# Patient Record
Sex: MALE | Race: WHITE | NOT HISPANIC OR LATINO | Employment: STUDENT | URBAN - METROPOLITAN AREA
[De-identification: names, ages, dates, MRNs, and addresses within clinical notes are randomized per-mention and may not be internally consistent; named-entity substitution may affect disease eponyms.]

---

## 2017-03-07 PROCEDURE — 99285 EMERGENCY DEPT VISIT HI MDM: CPT

## 2017-03-07 PROCEDURE — 99285 EMERGENCY DEPT VISIT HI MDM: CPT | Mod: ,,, | Performed by: EMERGENCY MEDICINE

## 2017-03-08 ENCOUNTER — HOSPITAL ENCOUNTER (EMERGENCY)
Facility: HOSPITAL | Age: 22
Discharge: HOME | End: 2017-03-08
Attending: EMERGENCY MEDICINE
Payer: COMMERCIAL

## 2017-03-08 VITALS
RESPIRATION RATE: 18 BRPM | SYSTOLIC BLOOD PRESSURE: 121 MMHG | DIASTOLIC BLOOD PRESSURE: 74 MMHG | TEMPERATURE: 98 F | HEART RATE: 78 BPM | OXYGEN SATURATION: 99 %

## 2017-03-08 DIAGNOSIS — F32.A DEPRESSION, UNSPECIFIED DEPRESSION TYPE: Primary | ICD-10-CM

## 2017-03-08 LAB
ALBUMIN SERPL BCP-MCNC: 3.6 G/DL
ALP SERPL-CCNC: 61 U/L
ALT SERPL W/O P-5'-P-CCNC: 29 U/L
ANION GAP SERPL CALC-SCNC: 8 MMOL/L
ANISOCYTOSIS BLD QL SMEAR: SLIGHT
APAP SERPL-MCNC: <3 UG/ML
AST SERPL-CCNC: 16 U/L
BASOPHILS # BLD AUTO: 0.02 K/UL
BASOPHILS NFR BLD: 0.2 %
BILIRUB SERPL-MCNC: 0.6 MG/DL
BUN SERPL-MCNC: 20 MG/DL
CALCIUM SERPL-MCNC: 8.4 MG/DL
CHLORIDE SERPL-SCNC: 106 MMOL/L
CO2 SERPL-SCNC: 26 MMOL/L
CREAT SERPL-MCNC: 1 MG/DL
DIFFERENTIAL METHOD: ABNORMAL
EOSINOPHIL # BLD AUTO: 0.2 K/UL
EOSINOPHIL NFR BLD: 2.9 %
ERYTHROCYTE [DISTWIDTH] IN BLOOD BY AUTOMATED COUNT: 13 %
EST. GFR  (AFRICAN AMERICAN): >60 ML/MIN/1.73 M^2
EST. GFR  (NON AFRICAN AMERICAN): >60 ML/MIN/1.73 M^2
ETHANOL SERPL-MCNC: <10 MG/DL
GLUCOSE SERPL-MCNC: 90 MG/DL
HCT VFR BLD AUTO: 42.6 %
HGB BLD-MCNC: 15.3 G/DL
LYMPHOCYTES # BLD AUTO: 3.5 K/UL
LYMPHOCYTES NFR BLD: 41.9 %
MCH RBC QN AUTO: 32.2 PG
MCHC RBC AUTO-ENTMCNC: 35.9 %
MCV RBC AUTO: 90 FL
MONOCYTES # BLD AUTO: 0.4 K/UL
MONOCYTES NFR BLD: 5.2 %
NEUTROPHILS # BLD AUTO: 4.1 K/UL
NEUTROPHILS NFR BLD: 49.8 %
OVALOCYTES BLD QL SMEAR: ABNORMAL
PLATELET # BLD AUTO: 172 K/UL
PLATELET BLD QL SMEAR: ABNORMAL
PMV BLD AUTO: 11.3 FL
POIKILOCYTOSIS BLD QL SMEAR: SLIGHT
POLYCHROMASIA BLD QL SMEAR: ABNORMAL
POTASSIUM SERPL-SCNC: 3.5 MMOL/L
PROT SERPL-MCNC: 6.2 G/DL
RBC # BLD AUTO: 4.75 M/UL
SODIUM SERPL-SCNC: 140 MMOL/L
TSH SERPL DL<=0.005 MIU/L-ACNC: 2.96 UIU/ML
WBC # BLD AUTO: 8.28 K/UL

## 2017-03-08 PROCEDURE — 84443 ASSAY THYROID STIM HORMONE: CPT

## 2017-03-08 PROCEDURE — 80329 ANALGESICS NON-OPIOID 1 OR 2: CPT

## 2017-03-08 PROCEDURE — 80053 COMPREHEN METABOLIC PANEL: CPT

## 2017-03-08 PROCEDURE — 80320 DRUG SCREEN QUANTALCOHOLS: CPT

## 2017-03-08 PROCEDURE — 85025 COMPLETE CBC W/AUTO DIFF WBC: CPT

## 2017-03-08 NOTE — DISCHARGE INSTRUCTIONS
Depression  Depression is one of the most common mental health problems today. It is not just a state of unhappiness or sadness. It is a true disease. The cause seems to be related to a decrease in chemicals that transmit signals in the brain. Having a family history of depression, alcoholism, or suicide increases the risk. Chronic illness, chronic pain, migraine headaches and high emotional stress also increase the risk.  Depression is something we tend to recognize in others, but may have a hard time seeing in ourselves. It can show in many physical and emotional ways:  · Loss of appetite  · Over-eating  · Not being able to sleep  · Sleeping too much  · Tiredness not related to physical exertion  · Restlessness or irritability  · Slowness of movement or speech  · Feeling depressed or withdrawn  · Loss of interest in things you once enjoyed  · Trouble concentrating, poor memory, trouble making decisions  · Thoughts of harming or killing oneself, or thoughts that life is not worth living  · Low self-esteem  The treatment for depression may include both medicine and psychotherapy. Antidepressants can reduce suffering and can improve the ability to function during the depressed period. Therapy can offer emotional support and help you understand emotional factors that may be causing the depression.  Home care  · On-going care and support helps people manage this disease.  Find a healthcare provider and therapist who meet your needs. Seek help when you feel like you may be getting ill.  · Be kind to yourself. Make it a point to do things that you enjoy (gardening, walking in nature, going to a movie, etc.). Reward yourself for small successes.  · Take care of your physical body. Eat a balanced diet (low in saturated fat and high in fruits and vegetables). Exercise at least 3 times a week for 30 minutes. Even mild-moderate exercise (like brisk walking) can make you feel better.  · Avoid alcohol, which can make  depression worse.  · Take medicine as prescribed.  · Tell each of your healthcare providers about all of the prescription drugs, over-the-counter medicines, vitamins, and supplements you take. Certain supplements interact with medicines and can result in dangerous side effects. Ask your pharmacist when you have questions about drug interactions.  · Talk with your family and trusted friends about your feelings and thoughts. Ask them to help you recognize behavior changes early so you can get help and, if needed, medicine can be adjusted.  Follow-up care  Follow up with your healthcare provider, or as advised.  Call 911  Call 911 if you:  · Have suicidal thoughts, a suicide plan, and the means to carry out the plan  · Have trouble breathing  · Are very confused  · Feel very drowsy or have trouble awakening  · Faint or lose consciousness  · Have new chest pain that becomes more severe, lasts longer, or spreads into your shoulder, arm, neck, jaw or back  When to seek medical advice  Call your healthcare provider right away if any of these occur:  · Feeling extreme depression, fear, anxiety, or anger toward yourself or others  · Feeling out of control  · Feeling that you may try to harm yourself or another  · Hearing voices that others do not hear  · Seeing things that others do not see  · Cant sleep or eat for 3 days in a row  · Friends or family express concern over your behavior and ask you to seek help  Date Last Reviewed: 9/29/2015  © 5373-7061 [employer]. [address] [place] [address], [place] [address]. All rights reserved. This information is not intended as a substitute for professional medical care. Always follow your healthcare professional's instructions.

## 2017-03-08 NOTE — ED AVS SNAPSHOT
[facility]  [address]  [facility] [address]               Baron CALEB Potterler   3/8/2017 12:36 AM   ED    Description:  Male : 1995   Department:  [facility]           Your Care was Coordinated By:     Provider Role From To    Tres Hodges MD Attending Provider 17 0038 --      Reason for Visit     Psychiatric Evaluation           Diagnoses this Visit        Comments    Depression, unspecified depression type    -  Primary       ED Disposition     None           To Do List           Follow-up Information     Follow up with [facility].    Specialty:  Emergency Medicine    Why:  If symptoms worsen    Contact information:    [address] [facility] [id number] [phone]        Follow up with Your Psychiatrist In 1 week.      [facility]     [facility] -  Assistance  Registered nurses in the [facility] provide clinical advisement, health education, appointment booking, and other advisory services.  Call for this free service at [phone].             Medications           Message regarding Medications     Verify the changes and/or additions to your medication regime listed below are the same as discussed with your clinician today.  If any of these changes or additions are incorrect, please notify your healthcare provider.             Verify that the below list of medications is an accurate representation of the medications you are currently taking.  If none reported, the list may be blank. If incorrect, please contact your healthcare provider. Carry this list with you in case of emergency.           Current Medications     albuterol (PROVENTIL) 2.5 mg /3 mL (0.083 %) nebulizer solution INHALE CONTENTS OF 1 VIAL BY NEBULIZATION EVERY 4 (FOUR) HOURS AS NEEDED FOR WHEEZING.    albuterol sulfate 2.5 mg/0.5 mL Nebu USE 2 VIALS BY NEBULIZATION EVERY 4 HOURS AS  NEEDED    beclomethasone (QVAR) 80 mcg/actuation Aero Inhale 2 puffs into the lungs 2 (two) times daily.    compressor, for nebulizer Etelvina 1 Device by Misc.(Non-Drug; Combo Route) route as needed.    epinephrine (EPIPEN JR) 0.15 mg/0.3 mL (1:2,000) pen injection Inject 0.3 mLs (0.15 mg total) into the muscle once as needed for Anaphylaxis.    mometasone-formoterol (DULERA) 200-5 mcg/actuation inhaler Inhale 2 puffs into the lungs 2 (two) times daily.    predniSONE (DELTASONE) 20 MG tablet TAKE 2 TABLETS (40 MG TOTAL) BY MOUTH 2 (TWO) TIMES DAILY.    PROAIR HFA 90 mcg/actuation inhaler INHALE 2 PUFFS INTO THE LUNGS EVERY 4 (FOUR) HOURS AS NEEDED FOR WHEEZING.    SINGULAIR 10 mg tablet TAKE 1 TABLET BY MOUTH AT BEDTIME           Clinical Reference Information           Your Vitals Were     BP Pulse Temp Resp          128/72 74 98.2 °F (36.8 °C) 18        Allergies as of 3/8/2017        Reactions    Shellfish Containing Products       Immunizations Administered on Date of Encounter - 3/8/2017     None      ED Micro, Lab, POCT     Start Ordered       Status Ordering Provider    03/08/17 0127 03/08/17 0127  CBC auto differential  STAT      In process     03/08/17 0127 03/08/17 0127  Comprehensive metabolic panel  STAT      In process     03/08/17 0127 03/08/17 0127  TSH  STAT      In process     03/08/17 0127 03/08/17 0127  Urinalysis - clean catch  STAT      Acknowledged     03/08/17 0127 03/08/17 0127  Drug screen panel, emergency  STAT      Acknowledged     03/08/17 0127 03/08/17 0127  Ethanol  STAT      In process     03/08/17 0127 03/08/17 0127  Acetaminophen level  STAT      In process       ED Imaging Orders     None        Discharge Instructions         Depression  Depression is one of the most common mental health problems today. It is not just a state of unhappiness or sadness. It is a true disease. The cause seems to be related to a decrease in chemicals that transmit signals in the brain. Having a family  history of depression, alcoholism, or suicide increases the risk. Chronic illness, chronic pain, migraine headaches and high emotional stress also increase the risk.  Depression is something we tend to recognize in others, but may have a hard time seeing in ourselves. It can show in many physical and emotional ways:  · Loss of appetite  · Over-eating  · Not being able to sleep  · Sleeping too much  · Tiredness not related to physical exertion  · Restlessness or irritability  · Slowness of movement or speech  · Feeling depressed or withdrawn  · Loss of interest in things you once enjoyed  · Trouble concentrating, poor memory, trouble making decisions  · Thoughts of harming or killing oneself, or thoughts that life is not worth living  · Low self-esteem  The treatment for depression may include both medicine and psychotherapy. Antidepressants can reduce suffering and can improve the ability to function during the depressed period. Therapy can offer emotional support and help you understand emotional factors that may be causing the depression.  Home care  · On-going care and support helps people manage this disease.  Find a healthcare provider and therapist who meet your needs. Seek help when you feel like you may be getting ill.  · Be kind to yourself. Make it a point to do things that you enjoy (gardening, walking in nature, going to a movie, etc.). Reward yourself for small successes.  · Take care of your physical body. Eat a balanced diet (low in saturated fat and high in fruits and vegetables). Exercise at least 3 times a week for 30 minutes. Even mild-moderate exercise (like brisk walking) can make you feel better.  · Avoid alcohol, which can make depression worse.  · Take medicine as prescribed.  · Tell each of your healthcare providers about all of the prescription drugs, over-the-counter medicines, vitamins, and supplements you take. Certain supplements interact with medicines and can result in dangerous side  effects. Ask your pharmacist when you have questions about drug interactions.  · Talk with your family and trusted friends about your feelings and thoughts. Ask them to help you recognize behavior changes early so you can get help and, if needed, medicine can be adjusted.  Follow-up care  Follow up with your healthcare provider, or as advised.  Call 911  Call 911 if you:  · Have suicidal thoughts, a suicide plan, and the means to carry out the plan  · Have trouble breathing  · Are very confused  · Feel very drowsy or have trouble awakening  · Faint or lose consciousness  · Have new chest pain that becomes more severe, lasts longer, or spreads into your shoulder, arm, neck, jaw or back  When to seek medical advice  Call your healthcare provider right away if any of these occur:  · Feeling extreme depression, fear, anxiety, or anger toward yourself or others  · Feeling out of control  · Feeling that you may try to harm yourself or another  · Hearing voices that others do not hear  · Seeing things that others do not see  · Cant sleep or eat for 3 days in a row  · Friends or family express concern over your behavior and ask you to seek help  Date Last Reviewed: 9/29/2015  © 8344-6299 [employer]. [address] [place] [address]. All rights reserved. This information is not intended as a substitute for professional medical care. Always follow your healthcare professional's instructions.          [facility] Sign-Up     Activating your [facility] account is as easy as 1-2-3!     1) Visit [email or web address], select Sign Up Now, enter this activation code and your date of birth, then select Next.  [email or web address]  Expires: 4/22/2017  3:14 AM      2) Create a username and password to use when you visit [facility] in the future and select a security question in case you lose your password and select Next.    3) Enter your e-mail address and click Sign Up!    Additional Information  If you have questions,  please e-mail [email or web address] or call [phone] to talk to our [facility] staff. Remember, [facility] is NOT to be used for urgent needs. For medical emergencies, dial 911.         Smoking Cessation     If you would like to quit smoking:   You may be eligible for free services if you are a [place] resident and started smoking cigarettes before September 1, 1988.  Call the Smoking Cessation Trust ([facility]) toll free at (470) 185-9713 or (080) 086-3004.   Call 3-177-QUIT-NOW if you do not meet the above criteria.             [facility] complies with applicable Federal civil rights laws and does not discriminate on the basis of race, color, national origin, age, disability, or sex.        Language Assistance Services     ATTENTION: Language assistance services are available, free of charge. Please call [phone].      ATENCIÓN: Si habla español, tiene a gonzalez disposición servicios gratuitos de asistencia lingüística. Llame al [phone].     CHÚ Ý: N?u b?n nói Ti?ng Vi?t, có các d?ch v? h? tr? ngôn ng? mi?n phí dành cho b?n. G?i s? [phone].
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
## 2017-10-17 ENCOUNTER — OFFICE VISIT (OUTPATIENT)
Dept: URGENT CARE | Facility: CLINIC | Age: 22
End: 2017-10-17
Payer: COMMERCIAL

## 2017-10-17 VITALS
RESPIRATION RATE: 16 BRPM | OXYGEN SATURATION: 100 % | DIASTOLIC BLOOD PRESSURE: 77 MMHG | SYSTOLIC BLOOD PRESSURE: 122 MMHG | HEART RATE: 64 BPM | TEMPERATURE: 97 F | WEIGHT: 160 LBS | HEIGHT: 70 IN | BODY MASS INDEX: 22.9 KG/M2

## 2017-10-17 DIAGNOSIS — J45.21 MILD INTERMITTENT ASTHMA WITH ACUTE EXACERBATION: Primary | ICD-10-CM

## 2017-10-17 PROCEDURE — 96372 THER/PROPH/DIAG INJ SC/IM: CPT | Mod: S$GLB,,, | Performed by: FAMILY MEDICINE

## 2017-10-17 PROCEDURE — 99214 OFFICE O/P EST MOD 30 MIN: CPT | Mod: 25,S$GLB,, | Performed by: FAMILY MEDICINE

## 2017-10-17 RX ORDER — PREDNISONE 20 MG/1
40 TABLET ORAL DAILY
Qty: 6 TABLET | Refills: 0 | Status: SHIPPED | OUTPATIENT
Start: 2017-10-17 | End: 2017-10-20

## 2017-10-17 RX ORDER — ALBUTEROL SULFATE 90 UG/1
2 AEROSOL, METERED RESPIRATORY (INHALATION) EVERY 6 HOURS PRN
Qty: 1 INHALER | Refills: 0 | Status: SHIPPED | OUTPATIENT
Start: 2017-10-17 | End: 2018-07-17

## 2017-10-17 RX ORDER — BETAMETHASONE SODIUM PHOSPHATE AND BETAMETHASONE ACETATE 3; 3 MG/ML; MG/ML
9 INJECTION, SUSPENSION INTRA-ARTICULAR; INTRALESIONAL; INTRAMUSCULAR; SOFT TISSUE
Status: COMPLETED | OUTPATIENT
Start: 2017-10-17 | End: 2017-10-17

## 2017-10-17 RX ORDER — AMOXICILLIN 500 MG/1
1000 CAPSULE ORAL EVERY 12 HOURS
Qty: 28 CAPSULE | Refills: 0 | Status: SHIPPED | OUTPATIENT
Start: 2017-10-17 | End: 2017-10-24

## 2017-10-17 RX ORDER — DEXTROAMPHETAMINE SACCHARATE, AMPHETAMINE ASPARTATE, DEXTROAMPHETAMINE SULFATE AND AMPHETAMINE SULFATE 3.75; 3.75; 3.75; 3.75 MG/1; MG/1; MG/1; MG/1
TABLET ORAL
Refills: 0 | COMMUNITY
Start: 2017-09-19

## 2017-10-17 RX ORDER — ALBUTEROL SULFATE 0.83 MG/ML
2.5 SOLUTION RESPIRATORY (INHALATION) EVERY 6 HOURS PRN
Qty: 1 BOX | Refills: 0 | Status: SHIPPED | OUTPATIENT
Start: 2017-10-17 | End: 2017-11-09 | Stop reason: SDUPTHER

## 2017-10-17 RX ADMIN — BETAMETHASONE SODIUM PHOSPHATE AND BETAMETHASONE ACETATE 9 MG: 3; 3 INJECTION, SUSPENSION INTRA-ARTICULAR; INTRALESIONAL; INTRAMUSCULAR; SOFT TISSUE at 09:10

## 2017-10-17 NOTE — LETTER
October 17, 2017      [facility]  [place] [address]  Phone: [phone]  Fax: [phone]       Patient: Baron Millie Shannon   YOB: 1995  Date of Visit: 10/17/2017    To Whom It May Concern:    Lavonne Shannon  was at [facility] on 10/17/2017. He may return to work/school on 10/18/2017 restrictions. If you have any questions or concerns, or if I can be of further assistance, please do not hesitate to contact me.    Sincerely,    Christa Unger MD.

## 2017-10-17 NOTE — PROGRESS NOTES
"Subjective:       Patient ID: Baron CALEB Shannon is a 22 y.o. male.    Vitals:  height is 5' 10" (1.778 m) and weight is 72.6 kg (160 lb). His oral temperature is 97.4 °F (36.3 °C). His blood pressure is 122/77 and his pulse is 64. His respiration is 16 and oxygen saturation is 100%.     Chief Complaint: Cough    Cough   This is a new problem. The current episode started yesterday. The problem has been gradually worsening. The problem occurs hourly. The cough is non-productive. Associated symptoms include nasal congestion, postnasal drip and a sore throat. Pertinent negatives include no chest pain, chills, ear pain, eye redness, fever, headaches, myalgias, shortness of breath or wheezing. Nothing aggravates the symptoms. He has tried OTC cough suppressant for the symptoms. The treatment provided mild relief. His past medical history is significant for asthma.     Review of Systems   Constitution: Positive for malaise/fatigue. Negative for chills and fever.   HENT: Positive for congestion, postnasal drip and sore throat. Negative for ear pain and hoarse voice.    Eyes: Negative for discharge and redness.   Cardiovascular: Negative for chest pain, dyspnea on exertion and leg swelling.   Respiratory: Positive for cough. Negative for shortness of breath, sputum production and wheezing.    Musculoskeletal: Negative for myalgias.   Gastrointestinal: Positive for diarrhea. Negative for abdominal pain and nausea.   Neurological: Negative for headaches.       Objective:      Physical Exam   Constitutional: He is oriented to person, place, and time. He appears well-developed and well-nourished. He is cooperative.  Non-toxic appearance. He does not appear ill. No distress.   HENT:   Head: Normocephalic and atraumatic.   Right Ear: Hearing, tympanic membrane, external ear and ear canal normal.   Left Ear: Hearing, tympanic membrane, external ear and ear canal normal.   Nose: Mucosal edema and rhinorrhea present. No nasal " deformity. No epistaxis. Right sinus exhibits no maxillary sinus tenderness and no frontal sinus tenderness. Left sinus exhibits no maxillary sinus tenderness and no frontal sinus tenderness.   Mouth/Throat: Uvula is midline, oropharynx is clear and moist and mucous membranes are normal. No trismus in the jaw. Normal dentition. No uvula swelling. No posterior oropharyngeal erythema.   Eyes: Conjunctivae and lids are normal. No scleral icterus.   Sclera clear bilat   Neck: Trachea normal, full passive range of motion without pain and phonation normal. Neck supple.   Cardiovascular: Normal rate, regular rhythm, normal heart sounds, intact distal pulses and normal pulses.    Pulmonary/Chest: Effort normal. No respiratory distress. He has wheezes in the right lower field.   Abdominal: Soft. Normal appearance and bowel sounds are normal. He exhibits no distension. There is no tenderness.   Musculoskeletal: Normal range of motion. He exhibits no edema or deformity.   Neurological: He is alert and oriented to person, place, and time. He exhibits normal muscle tone. Coordination normal.   Skin: Skin is warm, dry and intact. He is not diaphoretic. No pallor.   Psychiatric: He has a normal mood and affect. His speech is normal and behavior is normal. Judgment and thought content normal. Cognition and memory are normal.   Nursing note and vitals reviewed.      Assessment:       1. Mild intermittent asthma with acute exacerbation        Plan:         Mild intermittent asthma with acute exacerbation    Other orders  -     betamethasone acetate-betamethasone sodium phosphate injection 9 mg; Inject 1.5 mLs (9 mg total) into the muscle one time.  -     amoxicillin (AMOXIL) 500 MG capsule; Take 2 capsules (1,000 mg total) by mouth every 12 (twelve) hours.  Dispense: 28 capsule; Refill: 0  -     albuterol 90 mcg/actuation inhaler; Inhale 2 puffs into the lungs every 6 (six) hours as needed for Wheezing or Shortness of Breath. Rescue   Dispense: 1 Inhaler; Refill: 0  -     predniSONE (DELTASONE) 20 MG tablet; Take 2 tablets (40 mg total) by mouth once daily.  Dispense: 6 tablet; Refill: 0  -     albuterol (PROVENTIL) 2.5 mg /3 mL (0.083 %) nebulizer solution; Take 3 mLs (2.5 mg total) by nebulization every 6 (six) hours as needed for Wheezing. Rescue  Dispense: 1 Box; Refill: 0    Rest. Fluids. Tylenol/Ibuprofen.  Take antibiotics only if symptoms continue to worsen in the next several days.  FF up with PCP if not better after 10-14 days.  Patient voiced agreement and understanding.

## 2017-10-26 ENCOUNTER — OFFICE VISIT (OUTPATIENT)
Dept: URGENT CARE | Facility: CLINIC | Age: 22
End: 2017-10-26
Payer: COMMERCIAL

## 2017-10-26 ENCOUNTER — OFFICE VISIT (OUTPATIENT)
Dept: OTOLARYNGOLOGY | Facility: CLINIC | Age: 22
End: 2017-10-26
Payer: COMMERCIAL

## 2017-10-26 VITALS
SYSTOLIC BLOOD PRESSURE: 119 MMHG | WEIGHT: 162.25 LBS | TEMPERATURE: 97 F | HEART RATE: 94 BPM | HEIGHT: 70 IN | BODY MASS INDEX: 23.23 KG/M2 | DIASTOLIC BLOOD PRESSURE: 74 MMHG

## 2017-10-26 VITALS
TEMPERATURE: 98 F | DIASTOLIC BLOOD PRESSURE: 78 MMHG | WEIGHT: 160 LBS | OXYGEN SATURATION: 98 % | BODY MASS INDEX: 22.9 KG/M2 | RESPIRATION RATE: 16 BRPM | HEART RATE: 78 BPM | HEIGHT: 70 IN | SYSTOLIC BLOOD PRESSURE: 123 MMHG

## 2017-10-26 DIAGNOSIS — H92.12 OTORRHEA OF LEFT EAR: ICD-10-CM

## 2017-10-26 DIAGNOSIS — H74.8X2 HEMOTYMPANUM, LEFT: Primary | ICD-10-CM

## 2017-10-26 DIAGNOSIS — H66.012 ACUTE SUPPURATIVE OTITIS MEDIA OF LEFT EAR WITH SPONTANEOUS RUPTURE OF TYMPANIC MEMBRANE, RECURRENCE NOT SPECIFIED: Primary | ICD-10-CM

## 2017-10-26 DIAGNOSIS — J06.9 UPPER RESPIRATORY TRACT INFECTION, UNSPECIFIED TYPE: ICD-10-CM

## 2017-10-26 DIAGNOSIS — H74.8X2 HEMATOTYMPANUM OF LEFT EAR: ICD-10-CM

## 2017-10-26 PROCEDURE — 99204 OFFICE O/P NEW MOD 45 MIN: CPT | Mod: 25,S$GLB,, | Performed by: OTOLARYNGOLOGY

## 2017-10-26 PROCEDURE — 99999 PR PBB SHADOW E&M-EST. PATIENT-LVL III: CPT | Mod: PBBFAC,,, | Performed by: OTOLARYNGOLOGY

## 2017-10-26 PROCEDURE — 92504 EAR MICROSCOPY EXAMINATION: CPT | Mod: S$GLB,,, | Performed by: OTOLARYNGOLOGY

## 2017-10-26 PROCEDURE — 99214 OFFICE O/P EST MOD 30 MIN: CPT | Mod: S$GLB,,, | Performed by: NURSE PRACTITIONER

## 2017-10-26 RX ORDER — OFLOXACIN 3 MG/ML
5 SOLUTION AURICULAR (OTIC) DAILY
Qty: 10 ML | Refills: 0 | Status: ON HOLD | OUTPATIENT
Start: 2017-10-26 | End: 2018-01-08 | Stop reason: HOSPADM

## 2017-10-26 RX ORDER — PREDNISOLONE ACETATE 10 MG/ML
2 SUSPENSION/ DROPS OPHTHALMIC 3 TIMES DAILY
Qty: 10 ML | Refills: 0 | Status: SHIPPED | OUTPATIENT
Start: 2017-10-26 | End: 2017-11-05

## 2017-10-26 RX ORDER — CEFDINIR 300 MG/1
300 CAPSULE ORAL EVERY 12 HOURS
Qty: 14 CAPSULE | Refills: 0 | Status: SHIPPED | OUTPATIENT
Start: 2017-10-26 | End: 2017-11-02

## 2017-10-26 NOTE — PROGRESS NOTES
Chief Complaint   Patient presents with    Other     referred by urgent care, ear pain and bleeding in left ear started last night.    Sore Throat    Nasal Congestion    Cough   .     HPI: Baron CALEB Shannon is 22 y.o. male who isreferred by Dr. Alva at urgent care for evaluation of left ear pain and bloody otorrhea. He notes that he had a 1 week history of URI symptoms.  He noted that he had a sharp stabbing left ear pain that woke him from sleep and noted bloody otorrhea present on his pillow. Symptoms include left ear drainage , left ear pain, sore throat and nasal congestion. Symptoms began 1 week ago and are rapidly worsening since that time. Patient denies chills, fever and productive cough. Ear history: 0 previous ear infections. He admits to to hearing loss.           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, immuncap positive for multiple aeroallergens    Eczema     eczema is well controlled    Patient non adherence     Rhinitis, allergic      Social History     Social History    Marital status: Single     Spouse name: N/A    Number of children: N/A    Years of education: N/A     Occupational History    Not on file.     Social History Main Topics    Smoking status: Passive Smoke Exposure - Never Smoker    Smokeless tobacco: Never Used      Comment: Room mate    Alcohol use No    Drug use: No    Sexual activity: No     Other Topics Concern    Not on file     Social History Narrative    Lives at home with parents, two sisters, and his brother     Past Surgical History:   Procedure Laterality Date    staples in head      when in 8th grade on diving board      Family History   Problem Relation Age of Onset    Asthma Brother     Eczema Brother     Diabetes Maternal Grandfather     Obesity Maternal Grandfather            Review of Systems  General:  negative for chills, fever or weight loss  Psychological: negative for mood changes or depression  Ophthalmic: negative for blurry vision, photophobia or eye pain  ENT: see HPI  Respiratory: no cough, shortness of breath, or wheezing  Cardiovascular: no chest pain or dyspnea on exertion  Gastrointestinal: no abdominal pain, change in bowel habits, or black/ bloody stools  Musculoskeletal: negative for gait disturbance or muscular weakness  Neurological: no syncope or seizures; no ataxia  Dermatological: negative for puritis,  rash and jaundice  Hematologic/lymphatic: no easy bruising, no new lumps or bumps      Physical Exam:    Vitals:    10/26/17 1334   BP: 119/74   Pulse: 94   Temp: 97.2 °F (36.2 °C)       Constitutional: Well appearing / communicating without difficutly.  NAD.  Eyes: EOM I Bilaterally  Head/Face: Normocephalic.  Negative paranasal sinus pressure/tenderness.  Salivary glands WNL.  House Brackmann I Bilaterally.    Right Ear: Auricle normal appearance. External Auditory Canal within normal limits no lesions or masses,TM w/o masses/lesions/perforations. TM mobility noted.   Left Ear: Auricle normal appearance. External Auditory Canal within normal limits no lesions or masses,TM w/o masses/lesions/perforations. TM mobility noted.  Nose: No gross nasal septal deviation. Inferior Turbinates 3+ bilaterally. No septal perforation. No masses/lesions. External nasal skin appears normal without masses/lesions.  Oral Cavity: Gingiva/lips within normal limits.  Dentition/gingiva healthy appearing. Mucus membranes moist. Floor of mouth soft, no masses palpated. Oral Tongue mobile. Hard Palate appears normal.    Oropharynx: Base of tongue appears normal. No masses/lesions noted. Tonsillar fossa/pharyngeal wall without lesions. Posterior oropharynx WNL.  Soft palate without masses. Midline uvula.   Neck/Lymphatic: No LAD I-VI bilaterally.  No thyromegaly.  No masses noted on exam.    Mirror  laryngoscopy/nasopharyngoscopy: Active gag reflex.  Unable to perform.    Neuro/Psychiatric: AOx3.  Normal mood and affect.   Cardiovascular: Normal carotid pulses bilaterally, no increasing jugular venous distention noted at cervical region bilaterally.    Respiratory: Normal respiratory effort, no stridor, no retractions noted.      See separate procedure note for microscopy.      Assessment:    ICD-10-CM ICD-9-CM    1. Acute suppurative otitis media of left ear with spontaneous rupture of tympanic membrane, recurrence not specified H66.012 382.01    2. Hematotympanum of left ear H74.8X2 385.89    3. Otorrhea of left ear H92.12 388.60      The primary encounter diagnosis was Acute suppurative otitis media of left ear with spontaneous rupture of tympanic membrane, recurrence not specified. Diagnoses of Hematotympanum of left ear and Otorrhea of left ear were also pertinent to this visit.      Plan:  No orders of the defined types were placed in this encounter.    Acute otitis media with spontaneous tympanic membrane perforation- Omnicef 300mg PO BID and Ofloxacin/pred forte drops to the right ear. He was advised to keep the right ear dry, no nose blowing, avoid straining or heavy lifting. Follow up in 2 weeks to reassess. Audiogram at next visit.     Thank you kindly for allowing me to participate in the patient's care.       Kelley Roberson MD

## 2017-10-26 NOTE — PATIENT INSTRUCTIONS
FOLLOW UP WITH ENT TODAY AS SCHEDULED    Please return here or go to the Emergency Department for any concerns or worsening of condition.  If you were prescribed antibiotics, please take them to completion.  If you were prescribed a narcotic medication, do not drive or operate heavy equipment or machinery while taking these medications.  Please follow up with your primary care doctor or specialist as needed.    If you  smoke, please stop smoking.  Viral Upper Respiratory Illness (Adult)  You have a viral upper respiratory illness (URI), which is another term for the common cold. This illness is contagious during the first few days. It is spread through the air by coughing and sneezing. It may also be spread by direct contact (touching the sick person and then touching your own eyes, nose, or mouth). Frequent handwashing will decrease risk of spread. Most viral illnesses go away within 7 to 10 days with rest and simple home remedies. Sometimes the illness may last for several weeks. Antibiotics will not kill a virus, and they are generally not prescribed for this condition.    Home care  · If symptoms are severe, rest at home for the first 2 to 3 days. When you resume activity, don't let yourself get too tired.  · Avoid being exposed to cigarette smoke (yours or others).  · You may use acetaminophen or ibuprofen to control pain and fever, unless another medicine was prescribed. (Note: If you have chronic liver or kidney disease, have ever had a stomach ulcer or gastrointestinal bleeding, or are taking blood-thinning medicines, talk with your healthcare provider before using these medicines.) Aspirin should never be given to anyone under 18 years of age who is ill with a viral infection or fever. It may cause severe liver or brain damage.  · Your appetite may be poor, so a light diet is fine. Avoid dehydration by drinking 6 to 8 glasses of fluids per day (water, soft drinks, juices, tea, or soup). Extra fluids will  help loosen secretions in the nose and lungs.  · Over-the-counter cold medicines will not shorten the length of time youre sick, but they may be helpful for the following symptoms: cough, sore throat, and nasal and sinus congestion. (Note: Do not use decongestants if you have high blood pressure.)  Follow-up care  Follow up with your healthcare provider, or as advised.  When to seek medical advice  Call your healthcare provider right away if any of these occur:  · Cough with lots of colored sputum (mucus)  · Severe headache; face, neck, or ear pain  · Difficulty swallowing due to throat pain  · Fever of 100.4°F (38°C)  Call 911, or get immediate medical care  Call emergency services right away if any of these occur:  · Chest pain, shortness of breath, wheezing, or difficulty breathing  · Coughing up blood  · Inability to swallow due to throat pain  Date Last Reviewed: 9/13/2015  © 3894-9760 [employer]. [address] [place] [address], [place] [address]. All rights reserved. This information is not intended as a substitute for professional medical care. Always follow your healthcare professional's instructions.

## 2017-10-26 NOTE — LETTER
October 26, 2017      Isa Alva, ALYX  [address] [facility] [address]           [facility] Otorhinolaryngology  [address] [place] [address], [address]  [facility] [address]  Phone: [phone]  Fax: [phone]          Patient: Baron CALEB Shannon   MR Number: [id number]   YOB: 1995   Date of Visit: 10/26/2017       Dear Isa Alva:    Thank you for referring Baron Shannon to me for evaluation. Attached you will find relevant portions of my assessment and plan of care.    If you have questions, please do not hesitate to call me. I look forward to following Baron Shannon along with you.    Sincerely,    Kelley Roberson MD    Enclosure  CC:  No Recipients    If you would like to receive this communication electronically, please contact [email or web address] or [phone] to request more information on When You Wish Link access.    For providers and/or their staff who would like to refer a patient to [facility], please contact us through our one-stop-shop provider referral line, [facility], at [phone].    If you feel you have received this communication in error or would no longer like to receive these types of communications, please e-mail [email or web address]

## 2017-10-26 NOTE — PROGRESS NOTES
"Subjective:       Patient ID: Baron CALEB Shannon is a 22 y.o. male.    Vitals:  height is 5' 10" (1.778 m) and weight is 72.6 kg (160 lb). His oral temperature is 97.6 °F (36.4 °C). His blood pressure is 123/78 and his pulse is 78. His respiration is 16 and oxygen saturation is 98%.     Chief Complaint: Cough    PATIENT PRESENTS TODAY WITH CONGESTION, LEFT EAR PAIN AND BLEEDING FROM LEFT EAR. HE DENIES ANY HEAD INJURY OR EAR INJURY.  HE DENIES INTRODUCING FOREIGN BODY INTO LEFT EAR.  NO N/V.      Cough   This is a recurrent problem. The current episode started in the past 7 days. The problem has been gradually worsening. The problem occurs every few minutes. The cough is non-productive. Associated symptoms include chills, ear congestion, ear pain, a fever, myalgias, nasal congestion and a sore throat. Pertinent negatives include no chest pain, eye redness, headaches, shortness of breath or wheezing. Nothing aggravates the symptoms. He has tried oral steroids, ipratropium inhaler, steroid inhaler and OTC cough suppressant for the symptoms. The treatment provided no relief. His past medical history is significant for asthma and bronchitis.     Review of Systems   Constitution: Positive for chills and fever. Negative for malaise/fatigue.   HENT: Positive for congestion, ear discharge, ear pain, hearing loss and sore throat. Negative for hoarse voice.    Eyes: Negative for discharge and redness.   Cardiovascular: Negative for chest pain, dyspnea on exertion and leg swelling.   Respiratory: Positive for cough. Negative for shortness of breath, sputum production and wheezing.    Musculoskeletal: Positive for myalgias.   Gastrointestinal: Positive for diarrhea. Negative for abdominal pain and nausea.   Neurological: Negative for headaches.       Objective:      Physical Exam   Constitutional: He is oriented to person, place, and time. He appears well-developed and well-nourished. He is cooperative.  Non-toxic appearance. He " does not appear ill. No distress.   HENT:   Head: Normocephalic and atraumatic. Head is without raccoon's eyes and without Gamino's sign.   Right Ear: Hearing, tympanic membrane, external ear and ear canal normal.   Left Ear: Hearing, external ear and ear canal normal. There is drainage (BLOOD NOTED IN CANAL AND BEHIND TM). Tympanic membrane is bulging. Tympanic membrane is not perforated (NO PERFORATION APPRECIATED).   Nose: Nose normal. No mucosal edema, rhinorrhea or nasal deformity. No epistaxis. Right sinus exhibits no maxillary sinus tenderness and no frontal sinus tenderness. Left sinus exhibits no maxillary sinus tenderness and no frontal sinus tenderness.   Mouth/Throat: Uvula is midline, oropharynx is clear and moist and mucous membranes are normal. No trismus in the jaw. Normal dentition. No uvula swelling. No posterior oropharyngeal erythema.   Eyes: Conjunctivae and lids are normal. No scleral icterus.   Sclera clear bilat   Neck: Trachea normal, full passive range of motion without pain and phonation normal. Neck supple.   Cardiovascular: Normal rate, regular rhythm, normal heart sounds, intact distal pulses and normal pulses.    Pulmonary/Chest: Effort normal and breath sounds normal. No respiratory distress.   Abdominal: Soft. Normal appearance and bowel sounds are normal. He exhibits no distension. There is no tenderness.   Musculoskeletal: Normal range of motion. He exhibits no edema or deformity.   Neurological: He is alert and oriented to person, place, and time. No cranial nerve deficit or sensory deficit. He exhibits normal muscle tone. Coordination normal.   Skin: Skin is warm, dry and intact. He is not diaphoretic. No pallor.   Psychiatric: He has a normal mood and affect. His speech is normal and behavior is normal. Judgment and thought content normal. Cognition and memory are normal.   Nursing note and vitals reviewed.      Assessment:       1. Hemotympanum, left    2. Upper respiratory  tract infection, unspecified type        Plan:         Hemotympanum, left  -     Cancel: Ambulatory referral to ENT  -     cefdinir (OMNICEF) 300 MG capsule; Take 1 capsule (300 mg total) by mouth every 12 (twelve) hours.  Dispense: 14 capsule; Refill: 0  -     Ambulatory referral to ENT    Upper respiratory tract infection, unspecified type    Please return here or go to the Emergency Department for any concerns or worsening of condition.  If you were prescribed antibiotics, please take them to completion.  If you were prescribed a narcotic medication, do not drive or operate heavy equipment or machinery while taking these medications.  Please follow up with your primary care doctor or specialist as needed.    If you  smoke, please stop smoking.

## 2017-10-27 NOTE — PROCEDURES
Procedures     Binocular Microscopy    Indication:  Tympanic membrane perforation    Additional detail was required for the otoscopic examination of the left ear.  The operating microscope was used to directly visualize the tympanic membrane and middle ear landmarks.  Findings:  Canal skin:  normal   TM:  hemotympanus present; anterior tympanic membrane perforation with bloody otorrhea   Ossicles:  normal   Effusion:  none     The patient tolerated the procedure well.

## 2017-11-09 ENCOUNTER — LAB VISIT (OUTPATIENT)
Dept: LAB | Facility: HOSPITAL | Age: 22
End: 2017-11-09
Attending: OTOLARYNGOLOGY
Payer: COMMERCIAL

## 2017-11-09 ENCOUNTER — OFFICE VISIT (OUTPATIENT)
Dept: OTOLARYNGOLOGY | Facility: CLINIC | Age: 22
End: 2017-11-09
Payer: COMMERCIAL

## 2017-11-09 ENCOUNTER — CLINICAL SUPPORT (OUTPATIENT)
Dept: OTOLARYNGOLOGY | Facility: CLINIC | Age: 22
End: 2017-11-09
Payer: COMMERCIAL

## 2017-11-09 VITALS — DIASTOLIC BLOOD PRESSURE: 63 MMHG | HEART RATE: 77 BPM | TEMPERATURE: 97 F | SYSTOLIC BLOOD PRESSURE: 104 MMHG

## 2017-11-09 DIAGNOSIS — H66.012 ACUTE SUPPURATIVE OTITIS MEDIA OF LEFT EAR WITH SPONTANEOUS RUPTURE OF TYMPANIC MEMBRANE, RECURRENCE NOT SPECIFIED: Primary | ICD-10-CM

## 2017-11-09 DIAGNOSIS — H66.012 ACUTE SUPPURATIVE OTITIS MEDIA OF LEFT EAR WITH SPONTANEOUS RUPTURE OF TYMPANIC MEMBRANE, RECURRENCE NOT SPECIFIED: ICD-10-CM

## 2017-11-09 DIAGNOSIS — J33.9 NASAL POLYPOSIS: ICD-10-CM

## 2017-11-09 DIAGNOSIS — J30.89 NON-SEASONAL ALLERGIC RHINITIS DUE TO OTHER ALLERGIC TRIGGER, UNSPECIFIED CHRONICITY: Primary | ICD-10-CM

## 2017-11-09 DIAGNOSIS — J32.8 OTHER CHRONIC SINUSITIS: ICD-10-CM

## 2017-11-09 DIAGNOSIS — J30.89 NON-SEASONAL ALLERGIC RHINITIS DUE TO OTHER ALLERGIC TRIGGER, UNSPECIFIED CHRONICITY: ICD-10-CM

## 2017-11-09 PROCEDURE — 99214 OFFICE O/P EST MOD 30 MIN: CPT | Mod: 25,S$GLB,, | Performed by: OTOLARYNGOLOGY

## 2017-11-09 PROCEDURE — 92552 PURE TONE AUDIOMETRY AIR: CPT | Mod: S$GLB,,, | Performed by: AUDIOLOGIST-HEARING AID FITTER

## 2017-11-09 PROCEDURE — 31231 NASAL ENDOSCOPY DX: CPT | Mod: S$GLB,,, | Performed by: OTOLARYNGOLOGY

## 2017-11-09 PROCEDURE — 99999 PR PBB SHADOW E&M-EST. PATIENT-LVL III: CPT | Mod: PBBFAC,,, | Performed by: OTOLARYNGOLOGY

## 2017-11-09 PROCEDURE — 86003 ALLG SPEC IGE CRUDE XTRC EA: CPT | Mod: 59

## 2017-11-09 PROCEDURE — 86003 ALLG SPEC IGE CRUDE XTRC EA: CPT

## 2017-11-09 PROCEDURE — 36415 COLL VENOUS BLD VENIPUNCTURE: CPT

## 2017-11-09 PROCEDURE — 92567 TYMPANOMETRY: CPT | Mod: S$GLB,,, | Performed by: AUDIOLOGIST-HEARING AID FITTER

## 2017-11-09 NOTE — PROGRESS NOTES
Lissa Bosch, F-AAA [facility] [address]  [address]  [place] [address]      Patient: Baron CALEB Shannon   MRN: [id number]   : 1995  CORREA: 2017    AUDIOLOGICAL EVALUATION    CASE HISTORY:    Baron CALEB Shannon, 22 y.o., was seen on the above date for an audiological evaluation. Patient reported to be doing better.     TEST RESULTS:    Otoscopy was unremarkable for both ears. Imittance testing of both ears revealed normal middle ear compliance (Type A tympanograms). Did not complete speech testing as pure tone thresholds were within normal range.     IMPRESSION:   Audiological testing indicated that Baron CALEB Shannon has normal hearing in both ears.    RECOMMENDATIONS:   There are no audiological recommendations at this time.    If you should have any questions or concerns regarding the above information, please do not hesitate to contact me at [phone].      _______________________________  Alicia Bosch, [facility]  Clinical Audiologist    enclosure: audiogram

## 2017-11-09 NOTE — PROGRESS NOTES
Chief Complaint   Patient presents with    Follow-up     left ear has improved since last visit, bleeding has stopped, no ear pain   .     HPI: Baron CALEB Shannon is 22 y.o. male who follows up for left AOM with TM rupture.  He reports that his left ear is feeling better since last visit. He notes the bloody otorrhea has stopped. He feels his hearing is back to baseline. He denies ear pain.     He notes that he has had a lifelong history of nasal obstruction bilaterally. He notes this is associated with nasal congestion, hyposmia, nasal rhinorrhea, and post nasal drip. He notes a mild bilateral facial pressure in the ethmoid region bilaterally. He states that he has had positive allergy testing as a child. He has a history of Asthma and eczema.            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, immuncap positive for multiple aeroallergens    Eczema     eczema is well controlled    Patient non adherence     Rhinitis, allergic      Social History     Social History    Marital status: Single     Spouse name: N/A    Number of children: N/A    Years of education: N/A     Occupational History    Not on file.     Social History Main Topics    Smoking status: Passive Smoke Exposure - Never Smoker    Smokeless tobacco: Never Used      Comment: Room mate    Alcohol use No    Drug use: No    Sexual activity: No     Other Topics Concern    Not on file     Social History Narrative    Lives at home with parents, two sisters, and his brother     Past Surgical History:   Procedure Laterality Date    staples in head      when in 8th grade on diving board      Family History   Problem Relation Age of Onset    Asthma Brother     Eczema Brother     Diabetes Maternal Grandfather     Obesity Maternal Grandfather            Review of Systems  General: negative for chills,  fever or weight loss  Psychological: negative for mood changes or depression  Ophthalmic: negative for blurry vision, photophobia or eye pain  ENT: see HPI  Respiratory: no cough, shortness of breath, or wheezing  Cardiovascular: no chest pain or dyspnea on exertion  Gastrointestinal: no abdominal pain, change in bowel habits, or black/ bloody stools  Musculoskeletal: negative for gait disturbance or muscular weakness  Neurological: no syncope or seizures; no ataxia  Dermatological: negative for puritis,  rash and jaundice  Hematologic/lymphatic: no easy bruising, no new lumps or bumps      Physical Exam:    Vitals:    11/09/17 1602   BP: 104/63   Pulse: 77   Temp: 96.8 °F (36 °C)       Constitutional: Well appearing / communicating without difficutly.  NAD.  Eyes: EOM I Bilaterally  Head/Face: Normocephalic.  Negative paranasal sinus pressure/tenderness.  Salivary glands WNL.  House Brackmann I Bilaterally.    Right Ear: Auricle normal appearance. External Auditory Canal within normal limits no lesions or masses,TM w/o masses/lesions/perforations. TM mobility noted.   Left Ear: Auricle normal appearance. External Auditory Canal within normal limits no lesions or masses,TM with a healing TM perforation. no masses/lesions/perforations. TM mobility noted.  Nose: No gross nasal septal deviation. Inferior Turbinates 3+ bilaterally. No septal perforation. No masses/lesions. External nasal skin appears normal without masses/lesions.  Oral Cavity: Gingiva/lips within normal limits.  Dentition/gingiva healthy appearing. Mucus membranes moist. Floor of mouth soft, no masses palpated. Oral Tongue mobile. Hard Palate appears normal.    Oropharynx: Base of tongue appears normal. No masses/lesions noted. Tonsillar fossa/pharyngeal wall without lesions. Posterior oropharynx WNL.  Soft palate without masses. Midline uvula.   Neck/Lymphatic: No LAD I-VI bilaterally.  No thyromegaly.  No masses noted on exam.    Mirror  laryngoscopy/nasopharyngoscopy: Active gag reflex.  Unable to perform.    Neuro/Psychiatric: AOx3.  Normal mood and affect.   Cardiovascular: Normal carotid pulses bilaterally, no increasing jugular venous distention noted at cervical region bilaterally.    Respiratory: Normal respiratory effort, no stridor, no retractions noted.      See separate procedure note for nasal endoscopy.     Audiogram reviewed personally by myself and in detail with the patient today.         Assessment:    ICD-10-CM ICD-9-CM    1. Non-seasonal allergic rhinitis due to other allergic trigger, unspecified chronicity J30.89 477.8 Aspergillus fumagatus IgE      Bermuda grass IgE      Cat epithelium IgE      Cladosporium IgE      Cockroach, American IgE      [place], bald IgE      D. farinae IgE      D. pteronyssinus IgE      Dog dander IgE      Prashant grass IgE      Marsh elder, rough IgE      Oak, white IgE      Penicillium IgE      Ragweed, short, common IgE      Jessee IgE      Allergen, Elm Huson      Allergen, Meadow Grass ([place])      Allergen, Mucor Racemosus      Allergen, Pecan Hickory IgE      Allergen-Alternaria Alternata      Allergen-Huson      Allergen-Common Pigweed      Feather Panel #2      RAST Allergen Maple ([place])      RAST Allergen [place]   2. Other chronic sinusitis J32.8 473.8 CT Medtronic Sinuses without   3. Nasal polyposis J33.9 471.9 CT Medtronic Sinuses without   4. Acute suppurative otitis media of left ear with spontaneous rupture of tympanic membrane, recurrence not specified H66.012 382.01      The primary encounter diagnosis was Non-seasonal allergic rhinitis due to other allergic trigger, unspecified chronicity. Diagnoses of Other chronic sinusitis, Nasal polyposis, and Acute suppurative otitis media of left ear with spontaneous rupture of tympanic membrane, recurrence not specified were also pertinent to this visit.      Plan:  Orders Placed This Encounter   Procedures    CT [place]  Sinuses without    Aspergillus fumagatus IgE    Bermuda grass IgE    Cat epithelium IgE    Cladosporium IgE    Cockroach, American IgE    [place], bald IgE    D. farinae IgE    D. pteronyssinus IgE    Dog dander IgE    Prashant grass IgE    Marsh elder, rough IgE    Oak, white IgE    Penicillium IgE    Ragweed, short, common IgE    Jessee IgE    Allergen, Elm [place]    Allergen, Meadow Grass ([place] Blue)    Allergen, Mucor Racemosus    Allergen, Pecan Hickory IgE    Allergen-Alternaria Alternata    Allergen-Delta    Allergen-Common Pigweed    Feather Panel #2    RAST Allergen Maple ([place])    RAST Allergen [place]     Acute otitis media with spontaneous tympanic membrane perforation- healing He was advised to keep the left ear dry, no nose blowing, avoid straining or heavy lifting. Follow up in 4 weeks to reassess.     Nasal obstruction/CRS with polyposis: Plan for Immunocaps testing. Obtain CT scan of the sinuses to further assess for intraluminal patency.  Continue Singulair, start xyzal, and Flonase. Follow up after diagnostic testing is completed.     Thank you kindly for allowing me to participate in the patient's care.       Kelley Roberson MD

## 2017-11-13 LAB — FEATHER PANEL #2: <0.35 KU/L

## 2017-11-13 RX ORDER — FLUTICASONE PROPIONATE 50 MCG
2 SPRAY, SUSPENSION (ML) NASAL DAILY
Qty: 1 BOTTLE | Refills: 12 | Status: ON HOLD | OUTPATIENT
Start: 2017-11-13 | End: 2018-01-08 | Stop reason: HOSPADM

## 2017-11-13 RX ORDER — LEVOCETIRIZINE DIHYDROCHLORIDE 5 MG/1
5 TABLET, FILM COATED ORAL NIGHTLY
Qty: 30 TABLET | Refills: 11 | Status: SHIPPED | OUTPATIENT
Start: 2017-11-13 | End: 2018-11-13

## 2017-11-13 NOTE — PROCEDURES
Procedures     PROCEDURE NOTE:  Nasal endoscopy   Preprocedure diagnosis:  Chronic sinusitis  Postprocedure diangosis:  Same  Complications:  None  Blood Loss:  None    Procedure in detail:  After verbal consent was obtained, the patient's nasal cavity was anesthesized using topical 1%lidocaine and Neosynepherine.  A rigid 0 degree endoscope was placed in first the right, then the left nasal cavity.  The inferior and middle turbinates were examined, and found to be edematous and polypoid bilaterally. There was mucosal polyps emanating from the middle meatus bilaterally.   The middle meatus was occluded bilaterally.  No purulent drainage or masses seen.  The patient tolerated the procedure well and there were no complications.

## 2017-11-14 ENCOUNTER — TELEPHONE (OUTPATIENT)
Dept: OTOLARYNGOLOGY | Facility: CLINIC | Age: 22
End: 2017-11-14

## 2017-11-14 LAB
A ALTERNATA IGE QN: 7.51 KU/L
A FUMIGATUS IGE QN: 3.39 KU/L
ALLERGEN BOXELDER MAPLE TREE IGE: 5.5 KU/L
ALLERGEN MAPLE (BOX ELDER) CLASS: ABNORMAL
ALLERGEN MULBERRY CLASS: NORMAL
ALLERGEN MULBERRY TREE IGE: <0.35 KU/L
ALLERGEN PENICILLIUM IGE: 1.74 KU/L
BALD CYPRESS IGE QN: 0.66 KU/L
BERMUDA GRASS IGE QN: 3.82 KU/L
C HERBARUM IGE QN: 0.65 KU/L
CAT DANDER IGE QN: <0.35 KU/L
CEDAR IGE QN: <0.35 KU/L
CMN PIGWEED IGE QN: 6.5 KU/L
COMMON RAGWEED IGE QN: 2.99 KU/L
D FARINAE IGE QN: 7.8 KU/L
D PTERONYSS IGE QN: 6.39 KU/L
DEPRECATED A ALTERNATA IGE RAST QL: ABNORMAL
DEPRECATED A FUMIGATUS IGE RAST QL: ABNORMAL
DEPRECATED BALD CYPRESS IGE RAST QL: ABNORMAL
DEPRECATED BERMUDA GRASS IGE RAST QL: ABNORMAL
DEPRECATED C HERBARUM IGE RAST QL: ABNORMAL
DEPRECATED CAT DANDER IGE RAST QL: NORMAL
DEPRECATED CEDAR IGE RAST QL: NORMAL
DEPRECATED COMMON PIGWEED IGE RAST QL: ABNORMAL
DEPRECATED COMMON RAGWEED IGE RAST QL: ABNORMAL
DEPRECATED D FARINAE IGE RAST QL: ABNORMAL
DEPRECATED D PTERONYSS IGE RAST QL: ABNORMAL
DEPRECATED DOG DANDER IGE RAST QL: NORMAL
DEPRECATED JOHNSON GRASS IGE RAST QL: ABNORMAL
DEPRECATED KENT BLUE GRASS IGE RAST QL: ABNORMAL
DEPRECATED M RACEMOSUS IGE RAST QL: NORMAL
DEPRECATED MARSH ELDER IGE RAST QL: ABNORMAL
DEPRECATED PECAN/HICK TREE IGE RAST QL: ABNORMAL
DEPRECATED ROACH IGE RAST QL: ABNORMAL
DEPRECATED TIMOTHY IGE RAST QL: ABNORMAL
DEPRECATED WHITE OAK IGE RAST QL: ABNORMAL
DOG DANDER IGE QN: <0.35 KU/L
ELM CEDAR CLASS: ABNORMAL
ELM CEDAR, IGE: 1.14 KU/L
JOHNSON GRASS IGE QN: 5.13 KU/L
KENT BLUE GRASS IGE QN: 8.93 KU/L
M RACEMOSUS IGE QN: <0.35 KU/L
MARSH ELDER IGE QN: 1.27 KU/L
PECAN/HICK TREE IGE QN: 2.07 KU/L
PENICILLIUM CLASS: ABNORMAL
ROACH IGE QN: 9.77 KU/L
TIMOTHY IGE QN: 7.63 KU/L
WHITE OAK IGE QN: 12.6 KU/L

## 2017-11-14 NOTE — TELEPHONE ENCOUNTER
Spoke with patient he was notified of positive multiple allergens on his lab work, told patient for his follow up appointment Dr Esparza will further discuss the results. Patient verbalized understanding.

## 2017-11-16 ENCOUNTER — HOSPITAL ENCOUNTER (OUTPATIENT)
Dept: RADIOLOGY | Facility: HOSPITAL | Age: 22
Discharge: HOME | End: 2017-11-16
Attending: OTOLARYNGOLOGY
Payer: COMMERCIAL

## 2017-11-16 DIAGNOSIS — J33.9 NASAL POLYPOSIS: ICD-10-CM

## 2017-11-16 DIAGNOSIS — J32.8 OTHER CHRONIC SINUSITIS: ICD-10-CM

## 2017-11-16 PROCEDURE — 70486 CT MAXILLOFACIAL W/O DYE: CPT | Mod: TC

## 2017-11-16 PROCEDURE — 70486 CT MAXILLOFACIAL W/O DYE: CPT | Mod: 26,,, | Performed by: RADIOLOGY

## 2017-11-21 ENCOUNTER — OFFICE VISIT (OUTPATIENT)
Dept: OTOLARYNGOLOGY | Facility: CLINIC | Age: 22
End: 2017-11-21
Payer: COMMERCIAL

## 2017-11-21 VITALS
HEART RATE: 97 BPM | SYSTOLIC BLOOD PRESSURE: 118 MMHG | BODY MASS INDEX: 23.76 KG/M2 | DIASTOLIC BLOOD PRESSURE: 73 MMHG | WEIGHT: 165.56 LBS | TEMPERATURE: 98 F

## 2017-11-21 DIAGNOSIS — J32.8 OTHER CHRONIC SINUSITIS: Primary | ICD-10-CM

## 2017-11-21 DIAGNOSIS — J30.89 NON-SEASONAL ALLERGIC RHINITIS DUE TO OTHER ALLERGIC TRIGGER, UNSPECIFIED CHRONICITY: ICD-10-CM

## 2017-11-21 DIAGNOSIS — J45.909 NOT WELL CONTROLLED ASTHMA WITHOUT COMPLICATION: ICD-10-CM

## 2017-11-21 DIAGNOSIS — H66.012 ACUTE SUPPURATIVE OTITIS MEDIA OF LEFT EAR WITH SPONTANEOUS RUPTURE OF TYMPANIC MEMBRANE, RECURRENCE NOT SPECIFIED: ICD-10-CM

## 2017-11-21 DIAGNOSIS — J33.9 NASAL POLYPOSIS: ICD-10-CM

## 2017-11-21 PROCEDURE — 99999 PR PBB SHADOW E&M-EST. PATIENT-LVL IV: CPT | Mod: PBBFAC,,, | Performed by: OTOLARYNGOLOGY

## 2017-11-21 PROCEDURE — 99214 OFFICE O/P EST MOD 30 MIN: CPT | Mod: S$GLB,,, | Performed by: OTOLARYNGOLOGY

## 2017-11-21 RX ORDER — PREDNISONE 20 MG/1
TABLET ORAL
Qty: 21 TABLET | Refills: 0 | Status: SHIPPED | OUTPATIENT
Start: 2017-11-21 | End: 2018-01-04

## 2017-11-21 RX ORDER — ALBUTEROL SULFATE 90 UG/1
2 AEROSOL, METERED RESPIRATORY (INHALATION) EVERY 4 HOURS PRN
Qty: 8.5 INHALER | Refills: 0 | Status: SHIPPED | OUTPATIENT
Start: 2017-11-21 | End: 2018-02-07 | Stop reason: SDUPTHER

## 2017-11-21 RX ORDER — AMOXICILLIN AND CLAVULANATE POTASSIUM 875; 125 MG/1; MG/1
1 TABLET, FILM COATED ORAL 2 TIMES DAILY
Qty: 28 TABLET | Refills: 0 | Status: SHIPPED | OUTPATIENT
Start: 2017-11-21 | End: 2017-12-05

## 2017-11-21 RX ORDER — MUPIROCIN 20 MG/G
OINTMENT TOPICAL 2 TIMES DAILY
Qty: 15 G | Refills: 0 | Status: SHIPPED | OUTPATIENT
Start: 2017-11-21 | End: 2017-12-01

## 2017-11-21 NOTE — PROGRESS NOTES
"  Chief Complaint   Patient presents with    Sinusitis     Ct scan follow up visit    Cough    Nasal Congestion   .     HPI: Baron CALEB Shannon is 22 y.o. male who follows up for left AOM with TM rupture.  He reports that his left ear is feeling better since last visit. He notes the bloody otorrhea has stopped. He feels his hearing is back to baseline. He denies ear pain.     He notes that he has had a lifelong history of nasal obstruction bilaterally. He notes this is associated with nasal congestion, hyposmia, nasal rhinorrhea, and post nasal drip. He notes a mild bilateral facial pressure in the ethmoid region bilaterally. He states that he has had positive allergy testing as a child. He has a history of Asthma and eczema.      Interval HPI:  He reports no changes in his symptoms since last visit regarding hte nose.  He states the he has continued nasal obstruction and rhinorrhea. He feels his asthma is "worsening as the season is changing." He states he is out of his inhalers to treat his asthma. He feels his left ear is doing better.           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, immuncap positive for multiple aeroallergens    Eczema     eczema is well controlled    Patient non adherence     Rhinitis, allergic      Social History     Social History    Marital status: Single     Spouse name: N/A    Number of children: N/A    Years of education: N/A     Occupational History    Not on file.     Social History Main Topics    Smoking status: Passive Smoke Exposure - Never Smoker    Smokeless tobacco: Never Used      Comment: Room mate    Alcohol use No    Drug use: No    Sexual activity: No     Other Topics Concern    Not on file     Social History Narrative    Lives at home with parents, two sisters, and his brother     Past Surgical History:   Procedure " Laterality Date    staples in head      when in 8th grade on diving board      Family History   Problem Relation Age of Onset    Asthma Brother     Eczema Brother     Diabetes Maternal Grandfather     Obesity Maternal Grandfather            Review of Systems  General: negative for chills, fever or weight loss  Psychological: negative for mood changes or depression  Ophthalmic: negative for blurry vision, photophobia or eye pain  ENT: see HPI  Respiratory: no cough, shortness of breath, or wheezing  Cardiovascular: no chest pain or dyspnea on exertion  Gastrointestinal: no abdominal pain, change in bowel habits, or black/ bloody stools  Musculoskeletal: negative for gait disturbance or muscular weakness  Neurological: no syncope or seizures; no ataxia  Dermatological: negative for puritis,  rash and jaundice  Hematologic/lymphatic: no easy bruising, no new lumps or bumps      Physical Exam:    Vitals:    11/21/17 1315   BP: 118/73   Pulse: 97   Temp: 97.6 °F (36.4 °C)       Constitutional: Well appearing / communicating without difficutly.  NAD.  Eyes: EOM I Bilaterally  Head/Face: Normocephalic.  Negative paranasal sinus pressure/tenderness.  Salivary glands WNL.  House Brackmann I Bilaterally.    Right Ear: Auricle normal appearance. External Auditory Canal within normal limits no lesions or masses,TM w/o masses/lesions/perforations. TM mobility noted.   Left Ear: Auricle normal appearance. External Auditory Canal within normal limits no lesions or masses,TM with a healing TM perforation. no masses/lesions/perforations. TM mobility noted.  Nose: No gross nasal septal deviation. Inferior Turbinates 3+ bilaterally. No septal perforation. No masses/lesions. External nasal skin appears normal without masses/lesions.  Oral Cavity: Gingiva/lips within normal limits.  Dentition/gingiva healthy appearing. Mucus membranes moist. Floor of mouth soft, no masses palpated. Oral Tongue mobile. Hard Palate appears normal.     Oropharynx: Base of tongue appears normal. No masses/lesions noted. Tonsillar fossa/pharyngeal wall without lesions. Posterior oropharynx WNL.  Soft palate without masses. Midline uvula.   Neck/Lymphatic: No LAD I-VI bilaterally.  No thyromegaly.  No masses noted on exam.    Mirror laryngoscopy/nasopharyngoscopy: Active gag reflex.  Unable to perform.    Neuro/Psychiatric: AOx3.  Normal mood and affect.   Cardiovascular: Normal carotid pulses bilaterally, no increasing jugular venous distention noted at cervical region bilaterally.    Respiratory: Normal respiratory effort, no stridor, no retractions noted.    Diagnostic testing reviewed:     Results for BARON CALEB NOBLES (MRN [id number]) as of 11/21/2017 13:29   Ref. Range 2/4/2008 11:47 11/9/2017 17:11 11/9/2017 17:11   A. fumigatus Class Unknown  CLASS II    A. tenius, IgE Latest Units: kU/L 2.43     Allergen Maple ([place]) Class Unknown  CLASS III    Allergen Maple ([place]) IgE Latest Ref Range: <0.35 kU/L  5.50 (H)    Allergen Talmage Class Unknown  CLASS 0    Allergen Talmage IgE Latest Ref Range: <0.35 kU/L  <0.35    Altern. alternata Class Unknown  CLASS III    Alternaria alternata Latest Ref Range: <0.35 kU/L  7.51 (H)    Aspergillus Fumigatus IgE Latest Ref Range: <0.35 kU/L 1.94 3.39 (H)    B[place]ia Grass Latest Units: kU/L 14.9     Bald Glasco Class Unknown  CLASS I    Bermuda Grass Latest Ref Range: <0.35 kU/L  3.82 (H)    [place]uda Grass Class Unknown  CLASS III    Cat Dander Latest Ref Range: <0.35 kU/L  <0.35    Cat Epithelium Class Unknown  CLASS 0    Hanson Class Unknown  CLASS 0    Hanson IgE Latest Ref Range: <0.35 kU/L  <0.35    Cladosporium Class Unknown  CLASS I    Cladosporium, IgE Latest Ref Range: <0.35 kU/L  0.65 (H)    Cockroach, IgE Latest Ref Range: <0.35 kU/L 16.5 CLASS III 9.77 (H)   Common Pigweed Class Unknown  CLASS III    Common Pigweed IgE Latest Ref Range: <0.35 kU/L  6.50 (H)    Glasco Latest Ref Range: <0.35 kU/L  0.66 (H)     D. farinae Latest Ref Range: <0.35 kU/L 15.6 7.80 (H)    D. farinae Class Unknown  CLASS III    D. pteronyssinus Class Unknown  CLASS III    Dog Dander Class Unknown  CLASS 0    Dog Dander, IgE Latest Ref Range: <0.35 kU/L  <0.35    Egg White Latest Units: kU/L 0.57     Egg Yolk Latest Units: kU/L <0.35     Elm Weld Class Unknown  CLASS II    Elm Weld, IgE Latest Ref Range: <0.35 kU/L  1.14 (H)    Feather Panel #2 Latest Units: kU/L  <0.35    Prashant Grass Latest Ref Range: <0.35 kU/L 8.11 5.13 (H)    Prashant Grass Class Unknown  CLASS III    Marshelder Class Unknown  CLASS II    Marshelder IgE Latest Ref Range: <0.35 kU/L 2.05 1.27 (H)    Mite Dust Pteronyssinus IgE Latest Ref Range: <0.35 kU/L 11.8 6.39 (H)    [place], Class Unknown  CLASS III    Peanut IgE Latest Units: kU/L 1.33     Pecan [place] Tree Latest Ref Range: <0.35 kU/L  2.07 (H)    Pecan, Class Unknown  CLASS II    Penicillium Class Unknown  CLASS II    Penicillium, IgE Latest Ref Range: <0.35 kU/L  1.74 (H)    Ragweed, Short, Class Unknown  CLASS II    Ragweed, Short, IgE Latest Ref Range: <0.35 kU/L 7.82 2.99 (H)    Shrimp IgE Latest Units: kU/L >100.0     Soybean IgE Latest Units: kU/L 6.31     Jessee Grass Latest Ref Range: <0.35 kU/L 7.12 7.63 (H)    Jessee Grass Class Unknown  CLASS III    White [place], IgE Latest Units: kU/L 3.48       CT sinus 11/16/2017: There is diffuse opacification of bilateral inferior ethmoid air cells.  There is mucous thickening in bilateral maxillary sinuses measuring 4 mm in greatest thickness.The mastoid air cells are clear.  There is mucous thickening in the anterior sphenoid sinuses.  The frontal sinuses are clear. Bilateral OMC complexes are occluded.  Bilateral frontoethmoidal recesses are occluded. The osseous structures are unremarkable.    Assessment:    ICD-10-CM ICD-9-CM    1. Other chronic sinusitis J32.8 473.8    2. Non-seasonal allergic rhinitis due to other allergic trigger, unspecified chronicity  J30.89 477.8    3. Nasal polyposis J33.9 471.9    4. Acute suppurative otitis media of left ear with spontaneous rupture of tympanic membrane, recurrence not specified H66.012 382.01    5. Not well controlled asthma without complication J45.909 493.90      The primary encounter diagnosis was Other chronic sinusitis. Diagnoses of Non-seasonal allergic rhinitis due to other allergic trigger, unspecified chronicity, Nasal polyposis, Acute suppurative otitis media of left ear with spontaneous rupture of tympanic membrane, recurrence not specified, and Not well controlled asthma without complication were also pertinent to this visit.      Plan:  No orders of the defined types were placed in this encounter.    Acute otitis media with spontaneous tympanic membrane perforation- healing He was advised to keep the left ear dry, no nose blowing, avoid straining or heavy lifting. Follow up in 4 weeks to reassess.     Nasal obstruction/CRS with polyposis:   Continue Singulair, start xyzal, and Flonase and nasal saline irrigations. He would benefit from endoscopic sinus surgery and septoplasty for the treatment of his condition.  This would include the ethmoid and maxillary sinuses and would be bilateral.  Inferior turbinate reduction would be included.  I discussed the risks, benefits and alternatives to surgery with the patient, as well as the expected postoperative course.  I gave him the opportunity to ask questions and I answered all of them.  I provided relevant printed information on his condition for him to review at home.  Same-day discharge is anticipated.  He will need evaluation in the pre-anesthesia clinic.   The surgery will be scheduled in the near future.  He will need to return for a postoperative visit 1 week after surgery.    Allergic rhinitis/asthma int he setting of nasal polyposis: referral to allergy/immunology for evaluation of multiple immuno cap positive antigens and Asthma in setting of polyposis.      Thank you kindly for allowing me to participate in the patient's care.       Kelley Roberson MD

## 2017-11-24 ENCOUNTER — TELEPHONE (OUTPATIENT)
Dept: OTOLARYNGOLOGY | Facility: CLINIC | Age: 22
End: 2017-11-24

## 2017-11-24 DIAGNOSIS — J32.8 OTHER CHRONIC SINUSITIS: Primary | ICD-10-CM

## 2018-01-04 ENCOUNTER — HOSPITAL ENCOUNTER (OUTPATIENT)
Dept: PREADMISSION TESTING | Facility: HOSPITAL | Age: 23
Discharge: HOME | End: 2018-01-04
Attending: OTOLARYNGOLOGY
Payer: COMMERCIAL

## 2018-01-04 ENCOUNTER — ANESTHESIA EVENT (OUTPATIENT)
Dept: SURGERY | Facility: HOSPITAL | Age: 23
End: 2018-01-04
Payer: COMMERCIAL

## 2018-01-04 DIAGNOSIS — Z01.818 PRE-OP EXAM: Primary | ICD-10-CM

## 2018-01-04 RX ORDER — ALBUTEROL SULFATE 0.83 MG/ML
2.5 SOLUTION RESPIRATORY (INHALATION) EVERY 4 HOURS PRN
Status: CANCELLED | OUTPATIENT
Start: 2018-01-04

## 2018-01-04 RX ORDER — SODIUM CHLORIDE, SODIUM LACTATE, POTASSIUM CHLORIDE, CALCIUM CHLORIDE 600; 310; 30; 20 MG/100ML; MG/100ML; MG/100ML; MG/100ML
INJECTION, SOLUTION INTRAVENOUS CONTINUOUS
Status: CANCELLED | OUTPATIENT
Start: 2018-01-04

## 2018-01-04 NOTE — ANESTHESIA PREPROCEDURE EVALUATION
"                                                                                                             01/04/2018  Baron CALEB Shannon is a 22 y.o., male is scheduled for functional endoscopic sinus surgery with septoplasty and cauterization of turbinates under [facility] on 1/08/2018..  Hx of significant asthma on steroids (prednisone 40 mg qd x 5 ds).  Received albuterol aerosol breathing tx today 1003AM.  Breath sounds clear bilaterally all lung fields.    Review of patient's allergies indicates:   Allergen Reactions    Shellfish containing products          Past Surgical History:   Procedure Laterality Date    staples in head      when in 8th grade on diving board          Anesthesia Evaluation    I have reviewed the Patient Summary Reports.    I have reviewed the Nursing Notes.   I have reviewed the Medications.   Steroids Taken In Past Year: Prednisone    Review of Systems  Anesthesia Hx:  No problems with previous Anesthesia  Neg history of prior surgery. Denies Family Hx of Anesthesia complications.    Social:  Non-Smoker, No Alcohol Use    Hematology/Oncology:  Hematology Normal        EENT/Dental:   chronic allergic rhinitis  Ears General/Symptom(s) Ear Disease: Recent hx of acute suppurative otitis media of left ear with spontaneous rupture of tympanic membrane; now resolved after course of antibiotics 12/2017. Nasal Symptoms:  of congestion and bilateral nares Nasal Problems:  include Nasal Polyps Chronic non-seasonal allergic rhinitis due to  allergic trigger       Cardiovascular:  Cardiovascular Normal Exercise tolerance: good     Pulmonary:   Asthma (last "flare" 12/2017 with dose of prednisone; denies hospital visit in over 1 year; states worsens with weather changes) moderate and mild Denies Shortness of breath. Recent steroid use with last dose 12/21/2017.    Will order nebulizer prior to procedure   Renal/:  Renal/ Normal     Hepatic/GI:  Hepatic/GI Normal    Neurological:  Neurology Normal  "   Endocrine:  Endocrine Normal    Psych:  Attention Deficit Disorder.         Physical Exam  General:  Well nourished    Airway/Jaw/Neck:  Airway Findings: Mouth Opening: Normal Tongue: Normal  Oropharynx Findings: Normal Mallampati: I  TM Distance: Normal, at least 6 cm        Eyes/Ears/Nose:  EYES/EARS/NOSE FINDINGS: Normal   Dental:  Dental Findings: In tact   Chest/Lungs:  Chest/Lungs Clear    Heart/Vascular:  Heart Findings: Normal Heart murmur: negative    Abdomen:  Abdomen Findings: Normal      Mental Status:  Mental Status Findings: Normal        Anesthesia Plan  Type of Anesthesia, risks & benefits discussed:  Anesthesia Type:  general  Patient's Preference:   Intra-op Monitoring Plan:   Intra-op Monitoring Plan Comments:   Post Op Pain Control Plan:   Post Op Pain Control Plan Comments:   Induction:   IV  Beta Blocker:  Patient is on a Beta-Blocker and has received one dose within the past 24 hours (No further documentation required).       Informed Consent: Patient understands risks and agrees with Anesthesia plan.  Questions answered.   ASA Score: 2     Day of Surgery Review of History & Physical: I have interviewed and examined the patient. I have reviewed the patient's H&P dated:        Anesthesia Plan Notes: Anesthesia consent will be obtained prior to procedure on 1/08/2018.        Ready For Surgery From Anesthesia Perspective.

## 2018-01-04 NOTE — PRE-PROCEDURE INSTRUCTIONS
Mother Beatriz - [phone]    Allergies, medical, surgical, family and psychosocial histories reviewed with patient. Periop plan of care reviewed. Preop instructions given, including medications to take and to hold. Time allotted for questions to be addressed.  Patient verbalized understanding.

## 2018-01-04 NOTE — DISCHARGE INSTRUCTIONS
Your surgery is scheduled for 1/8/17.    Please report to Outpatient Surgery Intake Office on the 2nd FLOOR at 10:00 a.m.          INSTRUCTIONS IMPORTANT!!!  ¨ Do not eat or drink after 12 midnight-including water. OK to brush teeth, no   gum, candy or mints!      ____  No powder, lotions or creams to surgical area.  ____  Please remove all jewelry, including piercings and leave at home.  ____  No money or valuables needed. Please leave at home.  ____  Please bring any documents given by your doctor.  ____  If going home the same day, arrange for a ride home. You will not be able to             drive if Anesthesia was used.  ____  Wear loose fitting clothing. Allow for dressings, bandages.  ____  Stop Aspirin, Ibuprofen, Motrin and Aleve at least 3-5 days before surgery, unless otherwise instructed by your doctor, or the nurse.   You MAY use Tylenol/acetaminophen until day of surgery.  ____  If you take diabetic medication, do not take am of surgery unless instructed by Doctor.  ____  Call MD for temperature above 101 degrees.  ____ Stop taking any Fish Oil supplement or any Vitamins that contain Vitamin E at least 5 days prior to surgery.  ____ Do Not wear your contact lenses the day of your procedure.  You may wear your glasses.        I have read or had read and explained to me, and understand the above information.  Additional comments or instructions:  For additional questions call [phone]     Gargle with Listerine twice a day for 3 days prior to surgery, including the morning of surgery.        Endoscopic Sinus Surgery  The sinuses are hollow areas formed by the bones of the face. Normally, a thin layer of mucus drains from the sinuses into the nose. If the drainage path is blocked, problems such as infection can result. Endoscopic sinus surgery can be done to help clear blockages. The surgeon uses a thin, lighted tube (endoscope) that is put into your nose. The tube lets the doctor see and operate inside  your nose and sinuses.     Straightening the septum       Removing polyps         Opening the ethmoid sinuses       Clearing the outflow pathway      Straightening the septum  The septum is a piece of cartilage and bone that runs straight down the inside of the nose. It divides the nose into two sides.  A deviated septum is crooked instead of straight. A crooked septum can cause breathing problems. To fix a deviated septum, the doctor reshapes or trims the cartilage and bone. There is enough septum left for the nose to hold its shape. But the air has more space to move in and out of the nose. This improves your breathing.  Removing polyps  Polyps are small growths. They can grow in both the nose and sinuses. The surgeon may use different methods to remove them. Often, the surgeon uses special tools to remove polyps without harming nearby tissues.  Opening the ethmoid sinuses  The ethmoid sinuses are made up of many small air spaces, like a honeycomb. Like the other sinuses, the ethmoids have a lining that makes mucus. In some cases the drainage path is blocked. The doctor may open the thin walls of bone that separate the air spaces. This creates a passage for mucus to drain more easily.  Clearing the major outflow pathway of the sinuses  The osteomeatal complex is a term for a major outflow tract of your sinuses. Similar to a traffic jam, when this area becomes blocked, you may get symptoms in your maxillary, ethmoid, and frontal sinuses. Opening this area is a primary step in most sinus surgeries. The uncinate process is a small piece of bone and tissue in the sinuses. It forms an outlet for part of the sinuses. If this tissue is swollen (inflamed), it will block drainage of mucus. The doctor may remove the uncinate process so that mucus can drain.  Date Last Reviewed: 10/1/2016  © 1180-7644 [employer]. [address] [place] [address], [place] [address]. All rights reserved. This information is not intended  as a substitute for professional medical care. Always follow your healthcare professional's instructions.

## 2018-01-08 ENCOUNTER — HOSPITAL ENCOUNTER (OUTPATIENT)
Facility: HOSPITAL | Age: 23
Discharge: HOME | End: 2018-01-08
Attending: OTOLARYNGOLOGY | Admitting: OTOLARYNGOLOGY
Payer: COMMERCIAL

## 2018-01-08 ENCOUNTER — ANESTHESIA (OUTPATIENT)
Dept: SURGERY | Facility: HOSPITAL | Age: 23
End: 2018-01-08
Payer: COMMERCIAL

## 2018-01-08 ENCOUNTER — SURGERY (OUTPATIENT)
Age: 23
End: 2018-01-08

## 2018-01-08 VITALS
HEART RATE: 90 BPM | RESPIRATION RATE: 16 BRPM | DIASTOLIC BLOOD PRESSURE: 77 MMHG | BODY MASS INDEX: 22.9 KG/M2 | WEIGHT: 160 LBS | TEMPERATURE: 99 F | SYSTOLIC BLOOD PRESSURE: 120 MMHG | OXYGEN SATURATION: 96 % | HEIGHT: 70 IN

## 2018-01-08 DIAGNOSIS — J32.8 OTHER CHRONIC SINUSITIS: Primary | ICD-10-CM

## 2018-01-08 PROCEDURE — 71000015 HC POSTOP RECOV 1ST HR: Performed by: OTOLARYNGOLOGY

## 2018-01-08 PROCEDURE — 27201423 OPTIME MED/SURG SUP & DEVICES STERILE SUPPLY: Performed by: OTOLARYNGOLOGY

## 2018-01-08 PROCEDURE — 37000008 HC ANESTHESIA 1ST 15 MINUTES: Performed by: OTOLARYNGOLOGY

## 2018-01-08 PROCEDURE — C1763 CONN TISS, NON-HUMAN: HCPCS | Performed by: OTOLARYNGOLOGY

## 2018-01-08 PROCEDURE — 61782 SCAN PROC CRANIAL EXTRA: CPT | Mod: ,,, | Performed by: OTOLARYNGOLOGY

## 2018-01-08 PROCEDURE — 31253 NSL/SINS NDSC TOTAL: CPT | Mod: 50,,, | Performed by: OTOLARYNGOLOGY

## 2018-01-08 PROCEDURE — 94640 AIRWAY INHALATION TREATMENT: CPT

## 2018-01-08 PROCEDURE — 25000003 PHARM REV CODE 250: Performed by: NURSE PRACTITIONER

## 2018-01-08 PROCEDURE — 88305 TISSUE EXAM BY PATHOLOGIST: CPT | Performed by: PATHOLOGY

## 2018-01-08 PROCEDURE — 36000710: Performed by: OTOLARYNGOLOGY

## 2018-01-08 PROCEDURE — 63600175 PHARM REV CODE 636 W HCPCS

## 2018-01-08 PROCEDURE — 37000009 HC ANESTHESIA EA ADD 15 MINS: Performed by: OTOLARYNGOLOGY

## 2018-01-08 PROCEDURE — 25000003 PHARM REV CODE 250: Performed by: OTOLARYNGOLOGY

## 2018-01-08 PROCEDURE — 71000039 HC RECOVERY, EACH ADD'L HOUR: Performed by: OTOLARYNGOLOGY

## 2018-01-08 PROCEDURE — 25000242 PHARM REV CODE 250 ALT 637 W/ HCPCS: Performed by: NURSE PRACTITIONER

## 2018-01-08 PROCEDURE — 30140 RESECT INFERIOR TURBINATE: CPT | Mod: 50,51,, | Performed by: OTOLARYNGOLOGY

## 2018-01-08 PROCEDURE — 71000016 HC POSTOP RECOV ADDL HR: Performed by: OTOLARYNGOLOGY

## 2018-01-08 PROCEDURE — 25000003 PHARM REV CODE 250: Performed by: NURSE ANESTHETIST, CERTIFIED REGISTERED

## 2018-01-08 PROCEDURE — 63600175 PHARM REV CODE 636 W HCPCS: Performed by: OTOLARYNGOLOGY

## 2018-01-08 PROCEDURE — 31267 ENDOSCOPY MAXILLARY SINUS: CPT | Mod: 50,51,, | Performed by: OTOLARYNGOLOGY

## 2018-01-08 PROCEDURE — 63600175 PHARM REV CODE 636 W HCPCS: Performed by: ANESTHESIOLOGY

## 2018-01-08 PROCEDURE — 88305 TISSUE EXAM BY PATHOLOGIST: CPT | Mod: 26,,, | Performed by: PATHOLOGY

## 2018-01-08 PROCEDURE — 36000711: Performed by: OTOLARYNGOLOGY

## 2018-01-08 PROCEDURE — 71000033 HC RECOVERY, INTIAL HOUR: Performed by: OTOLARYNGOLOGY

## 2018-01-08 PROCEDURE — 63600175 PHARM REV CODE 636 W HCPCS: Performed by: NURSE ANESTHETIST, CERTIFIED REGISTERED

## 2018-01-08 RX ORDER — ROCURONIUM BROMIDE 10 MG/ML
INJECTION, SOLUTION INTRAVENOUS
Status: DISCONTINUED | OUTPATIENT
Start: 2018-01-08 | End: 2018-01-08

## 2018-01-08 RX ORDER — CEFDINIR 300 MG/1
300 CAPSULE ORAL 2 TIMES DAILY
Qty: 20 CAPSULE | Refills: 0 | Status: SHIPPED | OUTPATIENT
Start: 2018-01-08 | End: 2018-01-18

## 2018-01-08 RX ORDER — SODIUM CHLORIDE 0.9 % (FLUSH) 0.9 %
3 SYRINGE (ML) INJECTION EVERY 8 HOURS
Status: DISCONTINUED | OUTPATIENT
Start: 2018-01-08 | End: 2018-01-08 | Stop reason: HOSPADM

## 2018-01-08 RX ORDER — LIDOCAINE HYDROCHLORIDE 10 MG/ML
1 INJECTION, SOLUTION EPIDURAL; INFILTRATION; INTRACAUDAL; PERINEURAL ONCE
Status: DISCONTINUED | OUTPATIENT
Start: 2018-01-08 | End: 2018-01-08 | Stop reason: HOSPADM

## 2018-01-08 RX ORDER — HYDROMORPHONE HYDROCHLORIDE 2 MG/ML
0.5 INJECTION, SOLUTION INTRAMUSCULAR; INTRAVENOUS; SUBCUTANEOUS EVERY 5 MIN PRN
Status: DISCONTINUED | OUTPATIENT
Start: 2018-01-08 | End: 2018-01-08 | Stop reason: HOSPADM

## 2018-01-08 RX ORDER — MIDAZOLAM HYDROCHLORIDE 1 MG/ML
INJECTION INTRAMUSCULAR; INTRAVENOUS
Status: DISCONTINUED | OUTPATIENT
Start: 2018-01-08 | End: 2018-01-08

## 2018-01-08 RX ORDER — HYDROCODONE BITARTRATE AND ACETAMINOPHEN 7.5; 325 MG/1; MG/1
1 TABLET ORAL EVERY 6 HOURS PRN
Qty: 35 TABLET | Refills: 0 | Status: SHIPPED | OUTPATIENT
Start: 2018-01-08 | End: 2018-01-18

## 2018-01-08 RX ORDER — ONDANSETRON 8 MG/1
8 TABLET, ORALLY DISINTEGRATING ORAL ONCE
Status: DISCONTINUED | OUTPATIENT
Start: 2018-01-08 | End: 2018-01-08 | Stop reason: HOSPADM

## 2018-01-08 RX ORDER — PHENYLEPHRINE HYDROCHLORIDE 10 MG/ML
INJECTION INTRAVENOUS
Status: DISCONTINUED | OUTPATIENT
Start: 2018-01-08 | End: 2018-01-08

## 2018-01-08 RX ORDER — DEXAMETHASONE SODIUM PHOSPHATE 100 MG/10ML
INJECTION INTRAMUSCULAR; INTRAVENOUS
Status: DISCONTINUED | OUTPATIENT
Start: 2018-01-08 | End: 2018-01-08

## 2018-01-08 RX ORDER — SODIUM CHLORIDE, SODIUM LACTATE, POTASSIUM CHLORIDE, CALCIUM CHLORIDE 600; 310; 30; 20 MG/100ML; MG/100ML; MG/100ML; MG/100ML
INJECTION, SOLUTION INTRAVENOUS CONTINUOUS
Status: DISCONTINUED | OUTPATIENT
Start: 2018-01-08 | End: 2018-01-08 | Stop reason: HOSPADM

## 2018-01-08 RX ORDER — PROPOFOL 10 MG/ML
VIAL (ML) INTRAVENOUS
Status: DISCONTINUED | OUTPATIENT
Start: 2018-01-08 | End: 2018-01-08

## 2018-01-08 RX ORDER — HYDROMORPHONE HYDROCHLORIDE 2 MG/ML
INJECTION, SOLUTION INTRAMUSCULAR; INTRAVENOUS; SUBCUTANEOUS
Status: COMPLETED
Start: 2018-01-08 | End: 2018-01-08

## 2018-01-08 RX ORDER — SODIUM CHLORIDE 0.9 % (FLUSH) 0.9 %
3 SYRINGE (ML) INJECTION
Status: DISCONTINUED | OUTPATIENT
Start: 2018-01-08 | End: 2018-01-08 | Stop reason: HOSPADM

## 2018-01-08 RX ORDER — LIDOCAINE HYDROCHLORIDE AND EPINEPHRINE 10; 10 MG/ML; UG/ML
INJECTION, SOLUTION INFILTRATION; PERINEURAL
Status: DISCONTINUED | OUTPATIENT
Start: 2018-01-08 | End: 2018-01-08 | Stop reason: HOSPADM

## 2018-01-08 RX ORDER — HYDROCODONE BITARTRATE AND ACETAMINOPHEN 10; 325 MG/1; MG/1
1 TABLET ORAL EVERY 4 HOURS PRN
Status: DISCONTINUED | OUTPATIENT
Start: 2018-01-08 | End: 2018-01-08 | Stop reason: HOSPADM

## 2018-01-08 RX ORDER — ONDANSETRON 8 MG/1
8 TABLET, ORALLY DISINTEGRATING ORAL EVERY 8 HOURS PRN
Qty: 15 TABLET | Refills: 1 | Status: SHIPPED | OUTPATIENT
Start: 2018-01-08 | End: 2018-02-22

## 2018-01-08 RX ORDER — HYDROCODONE BITARTRATE AND ACETAMINOPHEN 5; 325 MG/1; MG/1
1 TABLET ORAL EVERY 4 HOURS PRN
Status: DISCONTINUED | OUTPATIENT
Start: 2018-01-08 | End: 2018-01-08 | Stop reason: HOSPADM

## 2018-01-08 RX ORDER — FENTANYL CITRATE 50 UG/ML
INJECTION, SOLUTION INTRAMUSCULAR; INTRAVENOUS
Status: DISCONTINUED | OUTPATIENT
Start: 2018-01-08 | End: 2018-01-08

## 2018-01-08 RX ORDER — CEFAZOLIN SODIUM 2 G/50ML
2 SOLUTION INTRAVENOUS
Status: COMPLETED | OUTPATIENT
Start: 2018-01-08 | End: 2018-01-08

## 2018-01-08 RX ORDER — SUCCINYLCHOLINE CHLORIDE 20 MG/ML
INJECTION INTRAMUSCULAR; INTRAVENOUS
Status: DISCONTINUED | OUTPATIENT
Start: 2018-01-08 | End: 2018-01-08

## 2018-01-08 RX ORDER — ONDANSETRON HYDROCHLORIDE 2 MG/ML
INJECTION, SOLUTION INTRAMUSCULAR; INTRAVENOUS
Status: DISCONTINUED | OUTPATIENT
Start: 2018-01-08 | End: 2018-01-08

## 2018-01-08 RX ORDER — HYDROMORPHONE HYDROCHLORIDE 2 MG/ML
0.2 INJECTION, SOLUTION INTRAMUSCULAR; INTRAVENOUS; SUBCUTANEOUS EVERY 5 MIN PRN
Status: ACTIVE | OUTPATIENT
Start: 2018-01-08 | End: 2018-01-08

## 2018-01-08 RX ORDER — MUPIROCIN 20 MG/G
OINTMENT TOPICAL
Status: DISCONTINUED | OUTPATIENT
Start: 2018-01-08 | End: 2018-01-08 | Stop reason: HOSPADM

## 2018-01-08 RX ORDER — OXYMETAZOLINE HCL 0.05 %
SPRAY, NON-AEROSOL (ML) NASAL
Status: DISCONTINUED | OUTPATIENT
Start: 2018-01-08 | End: 2018-01-08 | Stop reason: HOSPADM

## 2018-01-08 RX ORDER — DIPHENHYDRAMINE HYDROCHLORIDE 50 MG/ML
25 INJECTION INTRAMUSCULAR; INTRAVENOUS EVERY 6 HOURS PRN
Status: DISCONTINUED | OUTPATIENT
Start: 2018-01-08 | End: 2018-01-08 | Stop reason: HOSPADM

## 2018-01-08 RX ORDER — ALBUTEROL SULFATE 0.83 MG/ML
2.5 SOLUTION RESPIRATORY (INHALATION) EVERY 4 HOURS PRN
Status: DISCONTINUED | OUTPATIENT
Start: 2018-01-08 | End: 2018-01-08 | Stop reason: HOSPADM

## 2018-01-08 RX ORDER — ONDANSETRON 2 MG/ML
4 INJECTION INTRAMUSCULAR; INTRAVENOUS DAILY PRN
Status: DISCONTINUED | OUTPATIENT
Start: 2018-01-08 | End: 2018-01-08 | Stop reason: HOSPADM

## 2018-01-08 RX ORDER — LIDOCAINE HCL/PF 100 MG/5ML
SYRINGE (ML) INTRAVENOUS
Status: DISCONTINUED | OUTPATIENT
Start: 2018-01-08 | End: 2018-01-08

## 2018-01-08 RX ORDER — OXYMETAZOLINE HCL 0.05 %
2 SPRAY, NON-AEROSOL (ML) NASAL ONCE
Status: COMPLETED | OUTPATIENT
Start: 2018-01-08 | End: 2018-01-08

## 2018-01-08 RX ADMIN — OXYMETAZOLINE HYDROCHLORIDE 15 ML: 5 SPRAY NASAL at 12:01

## 2018-01-08 RX ADMIN — FENTANYL CITRATE 50 MCG: 50 INJECTION, SOLUTION INTRAMUSCULAR; INTRAVENOUS at 01:01

## 2018-01-08 RX ADMIN — OXYMETAZOLINE HYDROCHLORIDE 2 SPRAY: 5 SPRAY NASAL at 10:01

## 2018-01-08 RX ADMIN — HYDROMORPHONE HYDROCHLORIDE 0.5 MG: 2 INJECTION INTRAMUSCULAR; INTRAVENOUS; SUBCUTANEOUS at 04:01

## 2018-01-08 RX ADMIN — ONDANSETRON 8 MG: 2 INJECTION, SOLUTION INTRAMUSCULAR; INTRAVENOUS at 02:01

## 2018-01-08 RX ADMIN — MUPIROCIN 1 TUBE: 20 OINTMENT TOPICAL at 12:01

## 2018-01-08 RX ADMIN — ROCURONIUM BROMIDE 5 MG: 10 INJECTION, SOLUTION INTRAVENOUS at 12:01

## 2018-01-08 RX ADMIN — LIDOCAINE HYDROCHLORIDE 80 MG: 20 INJECTION, SOLUTION INTRAVENOUS at 12:01

## 2018-01-08 RX ADMIN — PHENYLEPHRINE HYDROCHLORIDE 50 MCG: 10 INJECTION INTRAVENOUS at 02:01

## 2018-01-08 RX ADMIN — MIDAZOLAM HYDROCHLORIDE 2 MG: 1 INJECTION, SOLUTION INTRAMUSCULAR; INTRAVENOUS at 12:01

## 2018-01-08 RX ADMIN — SODIUM CHLORIDE, SODIUM LACTATE, POTASSIUM CHLORIDE, AND CALCIUM CHLORIDE 10 ML/HR: 600; 310; 30; 20 INJECTION, SOLUTION INTRAVENOUS at 10:01

## 2018-01-08 RX ADMIN — ALBUTEROL SULFATE 2.5 MG: 2.5 SOLUTION RESPIRATORY (INHALATION) at 10:01

## 2018-01-08 RX ADMIN — FENTANYL CITRATE 100 MCG: 50 INJECTION, SOLUTION INTRAMUSCULAR; INTRAVENOUS at 12:01

## 2018-01-08 RX ADMIN — LIDOCAINE HYDROCHLORIDE,EPINEPHRINE BITARTRATE 30 ML: 10; .01 INJECTION, SOLUTION INFILTRATION; PERINEURAL at 12:01

## 2018-01-08 RX ADMIN — SUCCINYLCHOLINE CHLORIDE 160 MG: 20 INJECTION, SOLUTION INTRAMUSCULAR; INTRAVENOUS at 12:01

## 2018-01-08 RX ADMIN — SODIUM CHLORIDE, SODIUM LACTATE, POTASSIUM CHLORIDE, AND CALCIUM CHLORIDE: 600; 310; 30; 20 INJECTION, SOLUTION INTRAVENOUS at 02:01

## 2018-01-08 RX ADMIN — FENTANYL CITRATE 50 MCG: 50 INJECTION, SOLUTION INTRAMUSCULAR; INTRAVENOUS at 12:01

## 2018-01-08 RX ADMIN — PROPOFOL 300 MG: 10 INJECTION, EMULSION INTRAVENOUS at 12:01

## 2018-01-08 RX ADMIN — DEXAMETHASONE SODIUM PHOSPHATE 10 MG: 10 INJECTION INTRAMUSCULAR; INTRAVENOUS at 12:01

## 2018-01-08 RX ADMIN — CEFAZOLIN SODIUM 2 G: 2 SOLUTION INTRAVENOUS at 01:01

## 2018-01-08 RX ADMIN — HYDROMORPHONE HYDROCHLORIDE: 2 INJECTION INTRAMUSCULAR; INTRAVENOUS; SUBCUTANEOUS at 04:01

## 2018-01-08 NOTE — ANESTHESIA POSTPROCEDURE EVALUATION
"Anesthesia Post Evaluation    Patient: Baron CALEB Shannon    Procedure(s) Performed: Procedure(s) (LRB):  SINUS SURGERY FUNCTIONAL ENDOSCOPIC WITH NAVIGATION (N/A)  SEPTOPLASTY (N/A)  CAUTERIZATION OF TURBINATES (N/A)    Final Anesthesia Type: general  Patient location during evaluation: PACU  Level of consciousness: awake  Post-procedure vital signs: reviewed and stable  Airway patency: patent    Anesthetic complications: no      Cardiovascular status: stable  Respiratory status: room air  Hydration status: euvolemic  Follow-up not needed.        Visit Vitals  BP (!) 103/52   Pulse 78   Temp 37.2 °C (99 °F)   Resp 16   Ht 5' 10" (1.778 m)   Wt 72.6 kg (160 lb)   SpO2 98%   BMI 22.96 kg/m²       Pain/Jaqui Score: Pain Assessment Performed: Yes (1/8/2018 10:41 AM)  Presence of Pain: denies (1/8/2018 10:41 AM)      "

## 2018-01-08 NOTE — OP NOTE
DATE OF OPERATION: 1/8/2018    SURGEON:  Kelley Esparza MD     ASSISTANT SURGEON:  none     OPERATION:     1. Bilateral image-guided endoscopic total ethmoidectomy with removal of tissue.  2. Bilateral image-guided endoscopic maxillary antrostomy with removal of tissue.  3. Bilateral frontal sinusotomy  4.. Bilateral inferior turbinate reduction with submucosal resection.     PREOPERATIVE DIAGNOSIS:      1. Chronic rhinosinusitis.  2. Hypertrophic turbinates.       POSTOPERATIVE DIAGNOSIS:      1. Chronic rhinosinusitis.  2. Hypertrophic turbinates.       ANESTHESIA: General.     COMPLICATIONS: None.     ESTIMATED BLOOD LOSS: 25 mL     SPECIMEN: Bilateral sinonasal contents     WOUND EXPECTANCY: Clean-contaminated.    DRESSING: merogel packing  in bilateral middle meatus.  Merocel pack bilaterally.     FINDINGS: Bilateral azul polyps within the ethmoid and maxillary sinus antrum; polypoid tissue occluding bilateral frontal sinus outflow tract. polypoid and erythematous middle turbinates; polypoid mucosa in bilateral maxillary sinuses,bilateral inferior tubinate hypertrophy.      INDICATIONS: Chronic rhinosinusitis with polyposis, not controlled with maximal medical therapy.     I discussed the risks, benefits and alternatives of surgical correction of the chronically obstructed sinuses and associated turbinate hypertrophy with the patient as well as the expected postoperative course. I gave her the opportunity to ask questions and I answered all of them. On the morning of surgery I again met with the patient and reviewed the indications for surgery and she consented to proceed.     DESCRIPTION OF PROCEDURE: The patient was brought to the operating room and placed supine on the operating table. The patient was placed under general anesthesia and intubated. The patient was positioned with a donut under the head and the image-guidance headset for the [employer] Fusion system was applied.  Image-guided navigation was  indicated to facilitate exenteration of all ethmoid cells and the extent of sinusotomy.  The CT scan disc was loaded into the image-guidance system and registered with the patient tracking system according to the 's instructions. The pointer was calibrated and registration was verified using predefined landmarks.  Cottonoid pledgets soaked with neosynepherine  was placed into the nasal cavity bilaterally for mucosal decongestion.  Prophylactic cefazolin was given prior to the surgery start. A time-out was performed to confirm the proper patient, site and procedure.  The CT images were again reviewed prior to surgical start.  The patient was prepped and draped in the usual fashion.  The bed was placed in 20-degree reverse Trendelenberg position.     A 0-degree endoscope was used to examine the nasal cavity.  Local injections of 1% lidocaine with 1:100,000 parts epinephrine were then performed around the middle turbinates bilaterally as well as the inferior turbinate and the sphenopalatine ganglion region bilaterally.     Attention was then turned to the sinuses.  The right middle turbinate was medialized to with a jaycob elevator to gain access into the middle meatus.   The uncinate process was then visualized and a sickle knife was used to incise the uncinate process. The uncinate was dissected to its superior attachment and removed using cutting forceps.  A bessie bullosa was not present.       After removal of the bone, the natural ostium of the maxillary sinus was visible. The lumen was visualized with a 30-degree scope and entered using a curved suction to confirm the dimension. The antrostomy was enlarged with cutting instruments to include the natural sinus ostium, taking care not to move anterior to the maxillary line so as to avoid injury to the nasolacrimal duct.  Additional bone was removed using forceps.  Polypoid tissue  was present within the maxillary sinus.  This was thoroughly removed and  sent for pathologic evaluation.     The ethmoid bulla was entered using a microdebrider and anterior ethmoidectomy was performed.  The roof of the bulla was removed with forceps and the suprabullar recess was exposed. The grand lamella of the middle turbinate was then traversed and posterior ethmoidectomy was performed.  An Onodi cell was not present.  The mucosa was hypertrophic throughout the sinuses, indicative of chronic inflammation.  Topical hemostatic agents on pledgets were then placed into the ethmoid cavity for hemostasis.    Last attention was turned to the frontal sinus outflow tract. There was polypoid tissue occluding the outflow tract which was removed using an upbiting blakesly forceps.       Attention was then turned to the left side of the sinonasal tract.  A similar procedure was performed on this side, including uncinectomy, maxillary antrostomy with removal of tissue, anterior/posterior ethmoidectomy, and frontal sinusotomy.      At the conclusion of these procedures, the image-guidance probe was used to verify that all anterior ethmoid cells had been properly opened and that the skull base was visible and that the lamina papyracea had not been traversed on either side.  All sinuses were copiously irrigated with warm normal saline solution.     Next, the head of each inferior turbinate was injected with 0.5 cc of 1% lidocaine with epinephrine and a 15 blade was used to make a small incision at the head of the turbinate.  A Ariadna elevator was used to elevate the erectile tissue of the inferior turbinate.  The micodebrider with the turbinate blade was then used to remove the erectile tissue in the submucous plane.      At this point, the pledgets were all removed.   Merogel packing  was placed into the bilateral ethmoid cavities to stent open the surgical site.  The nasal cavity was bilateral Clarke packs.  Mupirocin ointment was applied to the vestibule bilaterally.  At this point, the headset  was removed and the drapes were taken down The patient was turned back toward the anesthesiologist and awakened from anesthesia, extubated and transferred to the recovery room in stable condition.

## 2018-01-08 NOTE — DISCHARGE INSTRUCTIONS
INSTRUCTIONS TO FOLLOW AFTER SINUS SURGERY  DR. Tauzin - [facility]      Your first office visit with Dr. Esparza after surgery should have been already scheduled.  If you dont know when it is, call Dr. Esparza's nurse Francine at [phone].    ACTIVITY:    Sleep on your back with the head of the bead elevated, up on 2-3 pillows, or in a recliner for the first 3 to 5 days. This will help with swelling.     After surgery you may have a lot of nasal drainage. This is normal. Do not wipe, touch, or dab your nose. You may breathe through your nose if youre able but avoid inhaling forcibly. Let all drainage fall on your mustache dressing and change it as needed.    You may shower 24 hours after surgery.    If you use CPAP or BiPAP to sleep at night, you should wait at least 48 hours before resuming use.  Dr. Esparza will advise you when it is safe to do this.    DRESSINGS:    Change the mustache dressing under your nose as needed. (If unsure what this dressing is or how to do this, ask your doctor or nurse before you leave the clinic/hospital.) You may have pinkish-red drainage for 2-3 days.    In certain cases you may have gauze packing inside your nostrils.  If so, these will turn from white to red from the drainage. This is normal so do not be alarmed. Do not touch or pull at the packing. The packing will be removed by your doctor at your first post-op visit.     You may also have a dissolvable stent or dissolvable sponge placed into the sinuses during surgery.  These usually do not need to be removed unless you are told otherwise by Dr. Esparza.  You may notice small fragments of these items come out of your nose in the weeks following surgery.    MEDICATIONS:  After surgery, you are generally sent home with prescription for an antibiotic, a pain medication, and an anti-nausea medication.     Start your antibiotics when you get home from surgery and take them until they are all gone.  It is best to take them with  food to prevent an upset stomach.    Most people need prescription pain medication for the first few days after surgery.  If you find that this is not necessary, you may use over-the-counter Tylenol (Acetaminophen) instead.    Avoid Aspirin, Motrin (Ibuprofen), Advil, Aleve and Vitamin E for 1 week before surgery and 1 week after surgery. There are many other medications to avoid as well due to the fact they act as blood thinners.      Some people have problems with bowel movements after surgery. If you have NOT had a bowel movement 3-5 days after surgery, go to your local pharmacy and purchase an over the counter stool softener such as COLACE. You can also ask the pharmacist for his or her recommendation. If you still do not have a bowel movement after starting the softener, please call Dr. Veras office.    You will need these over-the-counter medications after surgery:    Saline Sinus Rinse ([employer] brand):  You will use this to rinse out your nose and sinuses after surgery.  Begin doing this two days after surgery, unless instructed otherwise by Dr. Esparza.  You should do this 2 times a day, following the instructions on the box.    Afrin (regular strength): Only use if you have nasal congestion or bleeding. Use 2 times per day for 3 days, stop for 1 day, continue 2 times per day for 3 days, then stop completely.  NOTE:  You may not need to do this at all.      RESTRICTED ACTIVITIES AFTER SURGERY:    Do NOT blow your nose for 2 weeks. If you have to sneeze or cough, do so with your mouth open.   AVOID all heavy lifting, straining or bending for 2 weeks.   AVOID any sexual activity for 2 weeks after surgery.  AVOID semi-contact sports or vigorous exercising for 3-4 weeks. Dr. Esparza  will let you know when you are cleared to resume exercise.  No Swimming for 3-4 weeks.  No Flying for 2 weeks.    Do NOT operate a motor vehicle or any type of heavy machinery within 24 hours of taking pain medication.  DO NOT  smoke or be around smokers.  AVOID irritating substances such as dust, chalk, harsh chemicals, and allergic triggers.    DIET:    Avoid hot and spicy foods, or salty soups for 1 week after surgery.  Begin with bland foods the day after surgery and advance to your regular diet as tolerated.  It is not necessary to take only soft food unless you are recovering from tonsil surgery.  Drink plenty of fluids (water is best).   Avoid alcoholic and caffeinated beverages for 1 week after surgery.      Ondansetron tablets  What is this medicine?  ONDANSETRON (on DAN se becky) is used to treat nausea and vomiting caused by chemotherapy. It is also used to prevent or treat nausea and vomiting after surgery.  How should I use this medicine?  Take this medicine by mouth with a glass of water. Follow the directions on your prescription label. Take your doses at regular intervals. Do not take your medicine more often than directed.  Talk to your pediatrician regarding the use of this medicine in children. Special care may be needed.  What side effects may I notice from receiving this medicine?  Side effects that you should report to your doctor or health care professional as soon as possible:  · allergic reactions like skin rash, itching or hives, swelling of the face, lips or tongue  · breathing problems  · confusion  · dizziness  · fast or irregular heartbeat  · feeling faint or lightheaded, falls  · fever and chills  · loss of balance or coordination  · seizures  · sweating  · swelling of the hands or feet  · tightness in the chest  · tremors  · unusually weak or tired  Side effects that usually do not require medical attention (report to your doctor or health care professional if they continue or are bothersome):  · constipation or diarrhea  · headache  What may interact with this medicine?  Do not take this medicine with any of the following medications:  · apomorphine  · certain medicines for fungal infections like  fluconazole, itraconazole, ketoconazole, posaconazole, voriconazole  · cisapride  · dofetilide  · dronedarone  · pimozide  · thioridazine  · ziprasidone  This medicine may also interact with the following medications:  · carbamazepine  · certain medicines for depression, anxiety, or psychotic disturbances  · fentanyl  · linezolid  · MAOIs like Carbex, Eldepryl, Marplan, Nardil, and Parnate  · methylene blue (injected into a vein)  · other medicines that prolong the QT interval (cause an abnormal heart rhythm)  · phenytoin  · rifampicin  · tramadol  What if I miss a dose?  If you miss a dose, take it as soon as you can. If it is almost time for your next dose, take only that dose. Do not take double or extra doses.  Where should I keep my medicine?  Keep out of the reach of children.  Store between 2 and 30 degrees C (36 and 86 degrees F). Throw away any unused medicine after the expiration date.  What should I tell my health care provider before I take this medicine?  They need to know if you have any of these conditions:  · heart disease  · history of irregular heartbeat  · liver disease  · low levels of magnesium or potassium in the blood  · an unusual or allergic reaction to ondansetron, granisetron, other medicines, foods, dyes, or preservatives  · pregnant or trying to get pregnant  · breast-feeding  What should I watch for while using this medicine?  Check with your doctor or health care professional right away if you have any sign of an allergic reaction.  NOTE:This sheet is a summary. It may not cover all possible information. If you have questions about this medicine, talk to your doctor, pharmacist, or health care provider. Copyright© 2017 [employer]        Acetaminophen; Hydrocodone tablets or capsules  What is this medicine?  ACETAMINOPHEN; HYDROCODONE (a set a ANI eugene fen; blayne droe KOE done) is a pain reliever. It is used to treat moderate to severe pain.  How should I use this medicine?  Take this  medicine by mouth with a glass of water. Follow the directions on the prescription label. You can take it with or without food. If it upsets your stomach, take it with food. Do not take your medicine more often than directed.  A special MedGuide will be given to you by the pharmacist with each prescription and refill. Be sure to read this information carefully each time.  Talk to your pediatrician regarding the use of this medicine in children. Special care may be needed.  What side effects may I notice from receiving this medicine?  Side effects that you should report to your doctor or health care professional as soon as possible:  · allergic reactions like skin rash, itching or hives, swelling of the face, lips, or tongue  · breathing problems  · confusion  · redness, blistering, peeling or loosening of the skin, including inside the mouth  · signs and symptoms of low blood pressure like dizziness; feeling faint or lightheaded, falls; unusually weak or tired  · trouble passing urine or change in the amount of urine  · yellowing of the eyes or skin  Side effects that usually do not require medical attention (report to your doctor or health care professional if they continue or are bothersome):  · constipation  · dry mouth  · nausea, vomiting  · tiredness  What may interact with this medicine?  This medicine may interact with the following medications:  · alcohol  · antiviral medicines for HIV or AIDS  · atropine  · antihistamines for allergy, cough and cold  · certain antibiotics like erythromycin, clarithromycin  · certain medicines for anxiety or sleep  · certain medicines for bladder problems like oxybutynin, tolterodine  · certain medicines for depression like amitriptyline, fluoxetine, sertraline  · certain medicines for fungal infections like ketoconazole and itraconazole  · certain medicines for Parkinson's disease like benztropine, trihexyphenidyl  · certain medicines for seizures like carbamazepine,  phenobarbital, phenytoin, primidone  · certain medicines for stomach problems like dicyclomine, hyoscyamine  · certain medicines for travel sickness like scopolamine  · general anesthetics like halothane, isoflurane, methoxyflurane, propofol  · ipratropium  · local anesthetics like lidocaine, pramoxine, tetracaine  · MAOIs like Carbex, Eldepryl, Marplan, Nardil, and Parnate  · medicines that relax muscles for surgery  · other medicines with acetaminophen  · other narcotic medicines for pain or cough  · phenothiazines like chlorpromazine, mesoridazine, prochlorperazine, thioridazine  · rifampin  What if I miss a dose?  If you miss a dose, take it as soon as you can. If it is almost time for your next dose, take only that dose. Do not take double or extra doses.  Where should I keep my medicine?  Keep out of the reach of children. This medicine can be abused. Keep your medicine in a safe place to protect it from theft. Do not share this medicine with anyone. Selling or giving away this medicine is dangerous and against the law.  This medicine may cause accidental overdose and death if it taken by other adults, children, or pets. Mix any unused medicine with a substance like cat litter or coffee grounds. Then throw the medicine away in a sealed container like a sealed bag or a coffee can with a lid. Do not use the medicine after the expiration date.  Store at room temperature between 15 and 30 degrees C (59 and 86 degrees F).  What should I tell my health care provider before I take this medicine?  They need to know if you have any of these conditions:  · brain tumor  · Crohn's disease, inflammatory bowel disease, or ulcerative colitis  · drug abuse or addiction  · head injury  · heart or circulation problems  · if you often drink alcohol  · kidney disease or problems going to the bathroom  · liver disease  · lung disease, asthma, or breathing problems  · an unusual or allergic reaction to acetaminophen, hydrocodone,  other opioid analgesics, other medicines, foods, dyes, or preservatives  · pregnant or trying to get pregnant  · breast-feeding  What should I watch for while using this medicine?  Tell your doctor or health care professional if your pain does not go away, if it gets worse, or if you have new or a different type of pain. You may develop tolerance to the medicine. Tolerance means that you will need a higher dose of the medicine for pain relief. Tolerance is normal and is expected if you take the medicine for a long time.  Do not suddenly stop taking your medicine because you may develop a severe reaction. Your body becomes used to the medicine. This does NOT mean you are addicted. Addiction is a behavior related to getting and using a drug for a non-medical reason. If you have pain, you have a medical reason to take pain medicine. Your doctor will tell you how much medicine to take. If your doctor wants you to stop the medicine, the dose will be slowly lowered over time to avoid any side effects.  There are different types of narcotic medicines (opiates). If you take more than one type at the same time or if you are taking another medicine that also causes drowsiness, you may have more side effects. Give your health care provider a list of all medicines you use. Your doctor will tell you how much medicine to take. Do not take more medicine than directed. Call emergency for help if you have problems breathing or unusual sleepiness.  Do not take other medicines that contain acetaminophen with this medicine. Always read labels carefully. If you have questions, ask your doctor or pharmacist.  If you take too much acetaminophen get medical help right away. Too much acetaminophen can be very dangerous and cause liver damage. Even if you do not have symptoms, it is important to get help right away.  You may get drowsy or dizzy. Do not drive, use machinery, or do anything that needs mental alertness until you know how this  medicine affects you. Do not stand or sit up quickly, especially if you are an older patient. This reduces the risk of dizzy or fainting spells. Alcohol may interfere with the effect of this medicine. Avoid alcoholic drinks.  The medicine will cause constipation. Try to have a bowel movement at least every 2 to 3 days. If you do not have a bowel movement for 3 days, call your doctor or health care professional.  Your mouth may get dry. Chewing sugarless gum or sucking hard candy, and drinking plenty of water may help. Contact your doctor if the problem does not go away or is severe.  NOTE:This sheet is a summary. It may not cover all possible information. If you have questions about this medicine, talk to your doctor, pharmacist, or health care provider. Copyright© 2017 [employer]        Cefdinir capsules  What is this medicine?  CEFDINIR (SEF di ner) is a cephalosporin antibiotic. It is used to treat certain kinds of bacterial infections. It will not work for colds, flu, or other viral infections.  How should I use this medicine?  Take this medicine by mouth. Swallow it with a drink of water. Follow the directions on the prescription label. You can take it with or without food. If it upsets your stomach it may help to take it with food. Take your doses at regular intervals. Do not take it more often than directed. Finish all the medicine you are prescribed even if you think your infection is better.  Talk to your pediatrician regarding the use of this medicine in children. Special care may be needed.  What side effects may I notice from receiving this medicine?  Side effects that you should report to your doctor or health care professional as soon as possible:  · allergic reactions like skin rash, itching or hives, swelling of the face, lips, or tongue  · breathing problems  · fever or chills  · redness, blistering, peeling or loosening of the skin, including inside the mouth  · seizures  · severe or watery  diarrhea  · sore throat  · swollen joints  · trouble passing urine or change in the amount of urine  · unusual bleeding or bruising  · unusually weak or tired  Side effects that usually do not require medical attention (report to your doctor or health care professional if they continue or are bothersome):  · constipation  · dizziness  · gas or heartburn  · headache  · loss of appetite  · nausea or vomiting  · stomach pain  · stool discoloration  · vaginal itching  What may interact with this medicine?  · antacids that contain aluminum or magnesium  · iron supplements  · other antibiotics  · probenecid  What if I miss a dose?  If you miss a dose, take it as soon as you can. If it is almost time for your next dose, take only that dose. Do not take double or extra doses.  Where should I keep my medicine?  Keep out of the reach of children.  Store at room temperature between 15 and 30 degrees C (59 and 86 degrees F). Throw the medicine away after the expiration date.  What should I tell my health care provider before I take this medicine?  They need to know if you have any of these conditions:  · bleeding problems  · kidney disease  · stomach or intestine problems (especially colitis)  · an unusual or allergic reaction to cefdinir, other cephalosporin antibiotics, penicillin, penicillamine, other foods, dyes or preservatives  · pregnant or trying to get pregnant  · breast-feeding  What should I watch for while using this medicine?  Tell your doctor or health care professional if your symptoms do not get better in a few days.  If you are diabetic you may get a false-positive result for sugar in your urine. Check with your doctor or health care professional before you change your diet or the dose of your diabetes medicine.  NOTE:This sheet is a summary. It may not cover all possible information. If you have questions about this medicine, talk to your doctor, pharmacist, or health care provider. Copyright© 2017 [employer]

## 2018-01-08 NOTE — ANESTHESIA RELEASE NOTE
"Anesthesia Release from PACU Note    Patient: Baron CALEB Shannon    Procedure(s) Performed: Procedure(s) (LRB):  SINUS SURGERY FUNCTIONAL ENDOSCOPIC WITH NAVIGATION (N/A)  SEPTOPLASTY (N/A)  CAUTERIZATION OF TURBINATES (N/A)    Anesthesia type: general    Post pain: Adequate analgesia    Post assessment: no apparent anesthetic complications    Last Vitals:   Visit Vitals  /80   Pulse 92   Temp 37.2 °C (99 °F) (Temporal)   Resp 16   Ht 5' 10" (1.778 m)   Wt 72.6 kg (160 lb)   SpO2 96%   BMI 22.96 kg/m²       Post vital signs: stable    Level of consciousness: awake    Nausea/Vomiting: no nausea/no vomiting    Complications: none    Airway Patency: patent    Respiratory: unassisted    Cardiovascular: stable and blood pressure at baseline    Hydration: euvolemic  "

## 2018-01-08 NOTE — PLAN OF CARE
Discharge instructions reviewed with pt and pt's family. Pt and pt's family verbalize understanding. PIV discontinued as per md order. Tip intact. Pt tolerated well.

## 2018-01-08 NOTE — TRANSFER OF CARE
"Anesthesia Transfer of Care Note    Patient: Baron CALEB Shannon    Procedure(s) Performed: Procedure(s) (LRB):  SINUS SURGERY FUNCTIONAL ENDOSCOPIC WITH NAVIGATION (N/A)  SEPTOPLASTY (N/A)  CAUTERIZATION OF TURBINATES (N/A)    Patient location: PACU    Anesthesia Type: general    Transport from OR: Transported from OR on 6-10 L/min O2 by face mask with adequate spontaneous ventilation    Post pain: adequate analgesia    Post assessment: no apparent anesthetic complications and tolerated procedure well    Post vital signs: stable    Level of consciousness: awake, alert and oriented    Nausea/Vomiting: no nausea/vomiting    Complications: none    Transfer of care protocol was followed      Last vitals:   Visit Vitals  BP (!) 117/55   Pulse 63   Temp 37.1 °C (98.8 °F) (Oral)   Resp 20   Ht 5' 10" (1.778 m)   Wt 72.6 kg (160 lb)   SpO2 96%   BMI 22.96 kg/m²     "

## 2018-01-08 NOTE — H&P
Chief Complaint   Patient presents with    Sinusitis       Ct scan follow up visit    Cough    Nasal Congestion   .      HPI: Baron CALEB Shannon is 22 y.o. male who follows up for left AOM with TM rupture.  He reports that his left ear is feeling better since last visit. He notes the bloody otorrhea has stopped. He feels his hearing is back to baseline. He denies ear pain.      He notes that he has had a lifelong history of nasal obstruction bilaterally. He notes this is associated with nasal congestion, hyposmia, nasal rhinorrhea, and post nasal drip. He notes a mild bilateral facial pressure in the ethmoid region bilaterally. He states that he has had positive allergy testing as a child. He has a history of Asthma and eczema.       Interval HPI:  He reports no changes in his symptoms since last visit regarding the nose. He presents today for surgery.               Past Medical History:   Diagnosis Date    Accidental poisoning by second-hand tobacco smoke(E869.4)      Asthma, not well controlled      Asthma, well controlled      Atopy       IgE 400, immuncap positive for multiple aeroallergens    Eczema       eczema is well controlled    Patient non adherence      Rhinitis, allergic        Social History            Social History    Marital status: Single       Spouse name: N/A    Number of children: N/A    Years of education: N/A          Occupational History    Not on file.             Social History Main Topics    Smoking status: Passive Smoke Exposure - Never Smoker    Smokeless tobacco: Never Used         Comment: Room mate    Alcohol use No    Drug use: No    Sexual activity: No           Other Topics Concern    Not on file          Social History Narrative     Lives at home with parents, two sisters, and his brother            Past Surgical History:   Procedure Laterality Date    staples in head         when in 8th grade on diving board       Family History   Problem Relation Age of Onset     Asthma Brother      Eczema Brother      Diabetes Maternal Grandfather      Obesity Maternal Grandfather                 Review of Systems  General: negative for chills, fever or weight loss  Psychological: negative for mood changes or depression  Ophthalmic: negative for blurry vision, photophobia or eye pain  ENT: see HPI  Respiratory: no cough, shortness of breath, or wheezing  Cardiovascular: no chest pain or dyspnea on exertion  Gastrointestinal: no abdominal pain, change in bowel habits, or black/ bloody stools  Musculoskeletal: negative for gait disturbance or muscular weakness  Neurological: no syncope or seizures; no ataxia  Dermatological: negative for puritis,  rash and jaundice  Hematologic/lymphatic: no easy bruising, no new lumps or bumps        Physical Exam:         Vitals:     11/21/17 1315   BP: 118/73   Pulse: 97   Temp: 97.6 °F (36.4 °C)         Constitutional: Well appearing / communicating without difficutly.  NAD.  Eyes: EOM I Bilaterally  Head/Face: Normocephalic.  Negative paranasal sinus pressure/tenderness.  Salivary glands WNL.  House Brackmann I Bilaterally.     Right Ear: Auricle normal appearance. External Auditory Canal within normal limits no lesions or masses,TM w/o masses/lesions/perforations. TM mobility noted.   Left Ear: Auricle normal appearance. External Auditory Canal within normal limits no lesions or masses,TM with a healing TM perforation. no masses/lesions/perforations. TM mobility noted.  Nose: No gross nasal septal deviation. Inferior Turbinates 3+ bilaterally. No septal perforation. No masses/lesions. External nasal skin appears normal without masses/lesions.  Oral Cavity: Gingiva/lips within normal limits.  Dentition/gingiva healthy appearing. Mucus membranes moist. Floor of mouth soft, no masses palpated. Oral Tongue mobile. Hard Palate appears normal.    Oropharynx: Base of tongue appears normal. No masses/lesions noted. Tonsillar fossa/pharyngeal wall without  lesions. Posterior oropharynx WNL.  Soft palate without masses. Midline uvula.   Neck/Lymphatic: No LAD I-VI bilaterally.  No thyromegaly.  No masses noted on exam.     Mirror laryngoscopy/nasopharyngoscopy: Active gag reflex.  Unable to perform.     Neuro/Psychiatric: AOx3.  Normal mood and affect.   Cardiovascular: Normal carotid pulses bilaterally, no increasing jugular venous distention noted at cervical region bilaterally.    Respiratory: Normal respiratory effort, no stridor, no retractions noted.     Diagnostic testing reviewed:      CT sinus 11/16/2017: There is diffuse opacification of bilateral inferior ethmoid air cells.  There is mucous thickening in bilateral maxillary sinuses measuring 4 mm in greatest thickness.The mastoid air cells are clear.  There is mucous thickening in the anterior sphenoid sinuses.  The frontal sinuses are clear. Bilateral OMC complexes are occluded.  Bilateral frontoethmoidal recesses are occluded. The osseous structures are unremarkable.     Assessment:      ICD-10-CM ICD-9-CM     1. Other chronic sinusitis J32.8 473.8     2. Non-seasonal allergic rhinitis due to other allergic trigger, unspecified chronicity J30.89 477.8     3. Nasal polyposis J33.9 471.9       H66.012 382.01     4. Not well controlled asthma without complication J45.909 493.90        The primary encounter diagnosis was Other chronic sinusitis. Diagnoses of Non-seasonal allergic rhinitis due to other allergic trigger, unspecified chronicity, Nasal polyposis, Acute suppurative otitis media of left ear with spontaneous rupture of tympanic membrane, recurrence not specified, and Not well controlled asthma without complication were also pertinent to this visit.        Plan:  No orders of the defined types were placed in this encounter.        Nasal obstruction/CRS with polyposis:   Continue Singulair, start xyzal, and Flonase and nasal saline irrigations. He would benefit from endoscopic sinus surgery and  septoplasty for the treatment of his condition.  This would include the ethmoid and maxillary sinuses and would be bilateral.  Inferior turbinate reduction would be included.  I discussed the risks, benefits and alternatives to surgery with the patient, as well as the expected postoperative course.  I gave him the opportunity to ask questions and I answered all of them.  I provided relevant printed information on his condition for him to review at home.  Same-day discharge is anticipated.  He will need evaluation in the pre-anesthesia clinic.   The surgery will be scheduled in the near future.  He will need to return for a postoperative visit 1 week after surgery.     Allergic rhinitis/asthma int he setting of nasal polyposis: referral to allergy/immunology for evaluation of multiple immuno cap positive antigens and Asthma in setting of polyposis.      Thank you kindly for allowing me to participate in the patient's care.         Kelley Roberson MD

## 2018-01-08 NOTE — PLAN OF CARE
Notified Dr Esparza of pt's dressing being changed twice in the last 2 hours. Dr Esparza stated a lot of drainage is to be expected for the first 10 hours. Dressing may have to be changed every 45 minutes for the next 10 hours. And then every 2 hours after. Instructed pt on the dressing change frequency. Pt and pt's parents verbalize understanding.

## 2018-01-08 NOTE — DISCHARGE SUMMARY
Discharge Note    SUMMARY     Admit Date: 1/8/2018    Discharge Date and Time: 1/8/2017    Attending Physician: Kelley Roberson,*     Discharge Provider: Kelley Roberson    Final Diagnosis: Post-Op Diagnosis Codes:     * Other chronic sinusitis [J32.8]    Disposition: Home or Self Care    Follow Up/Patient Instructions: Dr. Esparza 1 week.     Medications:  Reconciled Home Medications: Current Discharge Medication List      START taking these medications    Details   cefdinir (OMNICEF) 300 MG capsule Take 1 capsule (300 mg total) by mouth 2 (two) times daily.  Qty: 20 capsule, Refills: 0      hydrocodone-acetaminophen 7.5-325mg (NORCO) 7.5-325 mg per tablet Take 1 tablet by mouth every 6 (six) hours as needed for Pain.  Qty: 35 tablet, Refills: 0      ondansetron (ZOFRAN-ODT) 8 MG TbDL Take 1 tablet (8 mg total) by mouth every 8 (eight) hours as needed (nausea).  Qty: 15 tablet, Refills: 1         CONTINUE these medications which have NOT CHANGED    Details   !! albuterol (PROAIR HFA) 90 mcg/actuation inhaler Inhale 2 puffs into the lungs every 4 (four) hours as needed for Wheezing or Shortness of Breath. Rescue  Qty: 8.5 Inhaler, Refills: 0      albuterol (PROVENTIL) 2.5 mg /3 mL (0.083 %) nebulizer solution INHALE CONTENTS OF 1 VIAL BY NEBULIZATION EVERY 4 (FOUR) HOURS AS NEEDED FOR WHEEZING.  Qty: 150 mL, Refills: 0      albuterol sulfate 2.5 mg/0.5 mL Nebu USE 2 VIALS BY NEBULIZATION EVERY 4 HOURS AS NEEDED  Qty: 30 each, Refills: 12      dextroamphetamine-amphetamine (ADDERALL) 15 mg tablet TK 1 T PO BID  Refills: 0      mometasone-formoterol (DULERA) 200-5 mcg/actuation inhaler Inhale 2 puffs into the lungs 2 (two) times daily.  Qty: 2 Inhaler, Refills: 3      !! albuterol 90 mcg/actuation inhaler Inhale 2 puffs into the lungs every 6 (six) hours as needed for Wheezing or Shortness of Breath. Rescue  Qty: 1 Inhaler, Refills: 0      compressor, for nebulizer Etelvina 1 Device by Misc.(Non-Drug;  Combo Route) route as needed.  Qty: 1 Device, Refills: 0      epinephrine (EPIPEN JR) 0.15 mg/0.3 mL (1:2,000) pen injection Inject 0.3 mLs (0.15 mg total) into the muscle once as needed for Anaphylaxis.  Qty: 1 each, Refills: 6      levocetirizine (XYZAL) 5 MG tablet Take 1 tablet (5 mg total) by mouth every evening.  Qty: 30 tablet, Refills: 11      SINGULAIR 10 mg tablet TAKE 1 TABLET BY MOUTH AT BEDTIME  Qty: 30 tablet, Refills: 0       !! - Potential duplicate medications found. Please discuss with provider.      STOP taking these medications       fluticasone (FLONASE) 50 mcg/actuation nasal spray Comments:   Reason for Stopping:         ofloxacin (FLOXIN) 0.3 % otic solution Comments:   Reason for Stopping:               Discharge Procedure Orders  Diet general     Activity as tolerated     Change dressing (specify)   Order Comments: Change dressing prn saturation.     Call MD for:  temperature >100.4     Call MD for:  persistent nausea and vomiting     Call MD for:  severe uncontrolled pain     Call MD for:  difficulty breathing, headache or visual disturbances       Follow-up Information     Kelley Roberson MD In 1 week.    Specialty:  Otolaryngology  Contact information:  [address]  [address]  [facility] [address] [phone]

## 2018-01-08 NOTE — PLAN OF CARE
Pt returned to OPS with TRINA Marshall. Pt aaox3. Respirations even and unlabored. vss (see flow sheet). Dressing to nose with small amount of sanguineous drainage noted. Pt denies pain

## 2018-01-08 NOTE — PLAN OF CARE
Moderate amount of sanguineous drainage noted to nasal dressing. Dressing changed. Pt and pt's dressing instructed on dressing change. Pt and pt's family verbalize understanding. Pt denies pain

## 2018-01-10 ENCOUNTER — TELEPHONE (OUTPATIENT)
Dept: OTOLARYNGOLOGY | Facility: CLINIC | Age: 23
End: 2018-01-10

## 2018-01-10 NOTE — TELEPHONE ENCOUNTER
----- Message from Kimber Chew sent at 1/10/2018  9:50 AM CST -----  Contact: [phone]/Beatriz pt's mother   Pt's mother called stating pt had nasal surgery last week and would like an appointment as soon as possible to make sure he  doesn't have an infection . Please advise

## 2018-01-10 NOTE — TELEPHONE ENCOUNTER
Spoke with Beatriz patient's mother she was notified his post op appointment is scheduled for 1/12/18 with Dr Esparza.

## 2018-01-12 ENCOUNTER — OFFICE VISIT (OUTPATIENT)
Dept: OTOLARYNGOLOGY | Facility: CLINIC | Age: 23
End: 2018-01-12
Payer: COMMERCIAL

## 2018-01-12 VITALS
SYSTOLIC BLOOD PRESSURE: 109 MMHG | HEART RATE: 68 BPM | RESPIRATION RATE: 18 BRPM | DIASTOLIC BLOOD PRESSURE: 70 MMHG | HEIGHT: 70 IN | TEMPERATURE: 98 F | BODY MASS INDEX: 23.02 KG/M2 | WEIGHT: 160.81 LBS

## 2018-01-12 DIAGNOSIS — J34.3 HYPERTROPHY OF INFERIOR NASAL TURBINATE: ICD-10-CM

## 2018-01-12 DIAGNOSIS — J33.9 NASAL POLYPOSIS: ICD-10-CM

## 2018-01-12 DIAGNOSIS — J30.89 NON-SEASONAL ALLERGIC RHINITIS DUE TO OTHER ALLERGIC TRIGGER, UNSPECIFIED CHRONICITY: ICD-10-CM

## 2018-01-12 DIAGNOSIS — J32.8 OTHER CHRONIC SINUSITIS: Primary | ICD-10-CM

## 2018-01-12 PROCEDURE — 99024 POSTOP FOLLOW-UP VISIT: CPT | Mod: S$GLB,,, | Performed by: OTOLARYNGOLOGY

## 2018-01-12 PROCEDURE — 99999 PR PBB SHADOW E&M-EST. PATIENT-LVL III: CPT | Mod: PBBFAC,,, | Performed by: OTOLARYNGOLOGY

## 2018-01-12 NOTE — PROGRESS NOTES
"  Subjective:      Baron CALEB Shannon is a 22 y.o. male who comes for follow-up 4 days status-post endoscopic sinus surgery.  He is here today for packing removal. He states the is doing fairly well. He is off of narcotics and is using nasal saline rinses.       Objective:     /70 (BP Location: Right arm, Patient Position: Sitting)   Pulse 68   Temp 98.1 °F (36.7 °C) (Oral)   Resp 18   Ht 5' 10" (1.778 m)   Wt 73 kg (160 lb 13.2 oz)   BMI 23.08 kg/m²      General:   not in distress   Nasal:  edematous mucosa   no septal hematoma   no bleeding  Bilateral jasbir packing removed without difficulty.   Oral Cavity:   clear   Oropharynx:   no bleeding   Neck:   nontender       Procedure    None        Data Reviewed    Pathology report indicated non-eosinophilic chronic inflammation.     Assessment:     Doing well following bilateral endoscopic sinus surgery.  He also underwent SMRT.     1. Other chronic sinusitis    2. Nasal polyposis    3. Non-seasonal allergic rhinitis due to other allergic trigger, unspecified chronicity    4. Hypertrophy of inferior nasal turbinate         Plan:     Nasal saline irrigations q 4-6 hours.   Follow-up in about 4 days (around 1/16/2018).     Kelley Roberson MD    "

## 2018-01-16 ENCOUNTER — OFFICE VISIT (OUTPATIENT)
Dept: OTOLARYNGOLOGY | Facility: CLINIC | Age: 23
End: 2018-01-16
Payer: COMMERCIAL

## 2018-01-16 VITALS
SYSTOLIC BLOOD PRESSURE: 111 MMHG | WEIGHT: 160.94 LBS | TEMPERATURE: 97 F | HEART RATE: 77 BPM | HEIGHT: 70 IN | DIASTOLIC BLOOD PRESSURE: 70 MMHG | BODY MASS INDEX: 23.04 KG/M2

## 2018-01-16 DIAGNOSIS — J33.9 NASAL POLYPOSIS: ICD-10-CM

## 2018-01-16 DIAGNOSIS — J34.3 HYPERTROPHY OF INFERIOR NASAL TURBINATE: ICD-10-CM

## 2018-01-16 DIAGNOSIS — J32.8 OTHER CHRONIC SINUSITIS: Primary | ICD-10-CM

## 2018-01-16 DIAGNOSIS — J30.89 NON-SEASONAL ALLERGIC RHINITIS DUE TO OTHER ALLERGIC TRIGGER, UNSPECIFIED CHRONICITY: ICD-10-CM

## 2018-01-16 PROCEDURE — 99999 PR PBB SHADOW E&M-EST. PATIENT-LVL III: CPT | Mod: PBBFAC,,, | Performed by: OTOLARYNGOLOGY

## 2018-01-16 PROCEDURE — 31237 NSL/SINS NDSC SURG BX POLYPC: CPT | Mod: 50,S$GLB,, | Performed by: OTOLARYNGOLOGY

## 2018-01-16 PROCEDURE — 99213 OFFICE O/P EST LOW 20 MIN: CPT | Mod: 25,S$GLB,, | Performed by: OTOLARYNGOLOGY

## 2018-01-16 RX ORDER — FLUTICASONE PROPIONATE 50 MCG
2 SPRAY, SUSPENSION (ML) NASAL DAILY
Qty: 1 BOTTLE | Refills: 12 | Status: SHIPPED | OUTPATIENT
Start: 2018-01-16

## 2018-01-16 RX ORDER — METHYLPREDNISOLONE 4 MG/1
TABLET ORAL
Qty: 1 PACKAGE | Refills: 0 | Status: SHIPPED | OUTPATIENT
Start: 2018-01-16 | End: 2018-01-24

## 2018-01-16 NOTE — PROGRESS NOTES
"  Subjective:      Baron CALEB Shannon is a 22 y.o. male who comes for follow-up 4 days status-post endoscopic sinus surgery.  He is here today for packing removal. He states the is doing fairly well. He is using nasal saline rinses.  He states that his breathing has been the best it has ever been in his whole life.       Objective:     /70 (BP Location: Right arm, Patient Position: Sitting, BP Method: Large (Automatic))   Pulse 77   Temp 96.9 °F (36.1 °C) (Oral)   Ht 5' 10" (1.778 m)   Wt 73 kg (160 lb 15 oz)   BMI 23.09 kg/m²      General:   not in distress   Nasal:  edematous mucosa   no septal hematoma   no bleeding  Bilateral jasbir packing removed without difficulty.   Oral Cavity:   clear   Oropharynx:   no bleeding   Neck:   nontender       Procedure    Endoscopic debridement performed.  See procedure note.        Data Reviewed    Pathology report indicated non-eosinophilic chronic inflammation.     Assessment:     Doing well following bilateral endoscopic sinus surgery.  He also underwent SMRT which is unrelated to today's visit.     1. Other chronic sinusitis    2. Nasal polyposis    3. Non-seasonal allergic rhinitis due to other allergic trigger, unspecified chronicity    4. Hypertrophy of inferior nasal turbinate         Plan:     Nasal saline irrigations q 4-6 hours. Resume flonase. Medrol dose pack prescription given.   Follow-up in about 1 week (around 1/23/2018).     Kelley Roberson MD  "

## 2018-01-16 NOTE — PROCEDURES
Nasal/sinus endoscopy  Date/Time: 1/16/2018 9:26 AM  Performed by: VICKEY KISER  Authorized by: VICKEY KISER     Consent Done?:  Yes (Verbal)    Endoscopic Sinonasal Debridement    Indication: Wound debridement following surgery for chronic sinusitis    Fiberoptic nasal endoscopy was used to assist with debridement of the sinonasal cavities as part of necessary postoperative care.  Informed consent was obtained prior to proceeding.  The nasal cavity was decongested with topical 0.25% phenylephrine and anesthetized with 4% lidocaine.  A rigid 0-degree endoscope was introduced into the nasal cavity.    Findings:  Nasal cavity:  inferior turbinates and septum intact   no synechiae   Ethmoid cavities:  dense crusted debris present bilaterally   debrided with Satish forceps   widely patent following debridement   Maxillary sinus:  antrostomy not visible bilaterally   dense crusted debris present bilaterally       Frontal outflow:  patent frontal outflow tract bilaterally   no polyps or exudate     The patient tolerated the procedure well.

## 2018-01-24 ENCOUNTER — OFFICE VISIT (OUTPATIENT)
Dept: OTOLARYNGOLOGY | Facility: CLINIC | Age: 23
End: 2018-01-24
Payer: COMMERCIAL

## 2018-01-24 VITALS — HEART RATE: 70 BPM | DIASTOLIC BLOOD PRESSURE: 73 MMHG | SYSTOLIC BLOOD PRESSURE: 113 MMHG

## 2018-01-24 DIAGNOSIS — J30.89 NON-SEASONAL ALLERGIC RHINITIS DUE TO OTHER ALLERGIC TRIGGER, UNSPECIFIED CHRONICITY: ICD-10-CM

## 2018-01-24 DIAGNOSIS — J34.3 HYPERTROPHY OF INFERIOR NASAL TURBINATE: ICD-10-CM

## 2018-01-24 DIAGNOSIS — J33.9 NASAL POLYPOSIS: ICD-10-CM

## 2018-01-24 DIAGNOSIS — J32.8 OTHER CHRONIC SINUSITIS: Primary | ICD-10-CM

## 2018-01-24 PROCEDURE — 99213 OFFICE O/P EST LOW 20 MIN: CPT | Mod: 25,S$GLB,, | Performed by: OTOLARYNGOLOGY

## 2018-01-24 PROCEDURE — 99999 PR PBB SHADOW E&M-EST. PATIENT-LVL III: CPT | Mod: PBBFAC,,, | Performed by: OTOLARYNGOLOGY

## 2018-01-24 NOTE — PROGRESS NOTES
Subjective:      Baron CALEB Shannon is a 22 y.o. male who comes for follow-up 3 weeks status-post endoscopic sinus surgery.   He states the is doing fairly well. He is using nasal saline rinses.  He states that breathing through his nose is much improved. He states that he was supposed to have allergy consult last week but it was cancelled due to inclement weather.       Objective:     /73 (BP Location: Left arm, Patient Position: Sitting, BP Method: Large (Automatic))   Pulse 70      General:   not in distress   Nasal:  edematous mucosa   no septal hematoma   no bleeding  Bilateral jasbir packing removed without difficulty.   Oral Cavity:   clear   Oropharynx:   no bleeding   Neck:   nontender       Procedure    Endoscopic debridement performed.  See procedure note.      Assessment:     Doing well following bilateral endoscopic sinus surgery.  He also underwent SMRT which is unrelated to today's visit.     1. Other chronic sinusitis    2. Nasal polyposis    3. Non-seasonal allergic rhinitis due to other allergic trigger, unspecified chronicity    4. Hypertrophy of inferior nasal turbinate         Plan:     Nasal saline irrigations q 4-6 hours. Increase Flonase to BID. Continue Xyzal and Singulair.  Allergy referral.   Follow-up in about 2 weeks (around 2/7/2018).     Kelley Roberson MD

## 2018-01-24 NOTE — PROCEDURES
Nasal/sinus endoscopy  Date/Time: 1/24/2018 10:52 AM  Performed by: VICKEY KISER  Authorized by: VICKEY KISER     Consent Done?:  Yes (Verbal)  Endoscopic Sinonasal Debridement    Indication: Wound debridement following surgery for chronic sinusitis    Fiberoptic nasal endoscopy was used to assist with debridement of the sinonasal cavities as part of necessary postoperative care.  Informed consent was obtained prior to proceeding.  The nasal cavity was decongested with topical 0.25% phenylephrine and anesthetized with 4% lidocaine.  A rigid 0-degree endoscope was introduced into the nasal cavity.    Findings:  Nasal cavity:  inferior turbinates and septum intact   no synechiae   Ethmoid cavities:  dense crusted debris present bilaterally   debrided with 10F suction   widely patent following debridement   Maxillary sinus:  antrostomy not visible bilaterally   dense crusted debris present bilaterally  Debrided with suction and ash forceps       Frontal outflow:  patent frontal outflow tract bilaterally   no polyps or exudate     The patient tolerated the procedure well.

## 2018-02-07 ENCOUNTER — OFFICE VISIT (OUTPATIENT)
Dept: OTOLARYNGOLOGY | Facility: CLINIC | Age: 23
End: 2018-02-07
Payer: COMMERCIAL

## 2018-02-07 VITALS
HEIGHT: 70 IN | DIASTOLIC BLOOD PRESSURE: 89 MMHG | HEART RATE: 71 BPM | BODY MASS INDEX: 23.34 KG/M2 | TEMPERATURE: 97 F | SYSTOLIC BLOOD PRESSURE: 123 MMHG | WEIGHT: 163 LBS

## 2018-02-07 DIAGNOSIS — J32.8 OTHER CHRONIC SINUSITIS: Primary | ICD-10-CM

## 2018-02-07 DIAGNOSIS — J30.89 NON-SEASONAL ALLERGIC RHINITIS DUE TO OTHER ALLERGIC TRIGGER, UNSPECIFIED CHRONICITY: ICD-10-CM

## 2018-02-07 DIAGNOSIS — J33.9 NASAL POLYPOSIS: ICD-10-CM

## 2018-02-07 DIAGNOSIS — J34.3 HYPERTROPHY OF INFERIOR NASAL TURBINATE: ICD-10-CM

## 2018-02-07 PROCEDURE — 99999 PR PBB SHADOW E&M-EST. PATIENT-LVL III: CPT | Mod: PBBFAC,,, | Performed by: OTOLARYNGOLOGY

## 2018-02-07 PROCEDURE — 99214 OFFICE O/P EST MOD 30 MIN: CPT | Mod: 25,S$GLB,, | Performed by: OTOLARYNGOLOGY

## 2018-02-07 PROCEDURE — 3008F BODY MASS INDEX DOCD: CPT | Mod: S$GLB,,, | Performed by: OTOLARYNGOLOGY

## 2018-02-07 PROCEDURE — 31231 NASAL ENDOSCOPY DX: CPT | Mod: 79,S$GLB,, | Performed by: OTOLARYNGOLOGY

## 2018-02-07 RX ORDER — PREDNISONE 20 MG/1
TABLET ORAL
Qty: 21 TABLET | Refills: 0 | Status: SHIPPED | OUTPATIENT
Start: 2018-02-07 | End: 2020-10-02

## 2018-02-07 RX ORDER — ALBUTEROL SULFATE 90 UG/1
2 AEROSOL, METERED RESPIRATORY (INHALATION) EVERY 4 HOURS PRN
Qty: 8.5 INHALER | Refills: 0 | Status: SHIPPED | OUTPATIENT
Start: 2018-02-07 | End: 2018-03-14 | Stop reason: SDUPTHER

## 2018-02-07 NOTE — PROGRESS NOTES
"  Subjective:      Baron CALEB Shannon is a 22 y.o. male who comes for follow-up 3 weeks status-post endoscopic sinus surgery.   He states the is doing fairly well. He is using nasal saline rinses.  He states that he is breathing well through bilateral nares but feels that he is having increased allergy symptoms of sneezing, itchy watery eyes, and itchy nose. He states that he is awaiting his allergy consult.     Objective:     /89 (BP Location: Left arm, Patient Position: Sitting, BP Method: Large (Automatic))   Pulse 71   Temp 97.3 °F (36.3 °C) (Oral)   Ht 5' 10" (1.778 m)   Wt 73.9 kg (163 lb 0.5 oz)   BMI 23.39 kg/m²      General:   not in distress   Nasal:  edematous mucosa   no septal hematoma   no bleeding   Oral Cavity:   clear   Oropharynx:   no bleeding   Neck:   nontender       Procedure    Nasal endoscopy performed.  See procedure note.      Assessment:     Doing well following bilateral endoscopic sinus surgery.  He also underwent SMRT which is unrelated to today's visit.     1. Other chronic sinusitis    2. Nasal polyposis    3. Non-seasonal allergic rhinitis due to other allergic trigger, unspecified chronicity    4. Hypertrophy of inferior nasal turbinate         Plan:     Nasal saline irrigations q 4-6 hours. Increase Flonase to BID. Continue Xyzal and Singulair. Prednisone taper.  Allergy referral pending. Follow up in 3 weeks.      Kelley Roberson MD  "

## 2018-02-08 NOTE — PROCEDURES
Nasal/sinus endoscopy  Date/Time: 2/8/2018 7:14 AM  Performed by: VICKEY KISER  Authorized by: VICKEY KISER     Consent Done?:  Yes (Verbal)  Nasal endoscopy    Indication: chronic sinusitis with recent ESS    Fiberoptic nasal endoscopy was used to assist with debridement of the sinonasal cavities as part of necessary postoperative care.  Informed consent was obtained prior to proceeding.  The nasal cavity was decongested with topical 0.25% phenylephrine and anesthetized with 4% lidocaine.  A rigid 0-degree endoscope was introduced into the nasal cavity.    Findings:  Nasal cavity:  inferior turbinates and septum intact   no synechiae   Ethmoid cavities:  minimal debris bilaterally   debrided with 10F suction   widely patent following debridement   Maxillary sinus:  widely patent antrostomy bilaterally   No debris present bilaterally         Frontal outflow:  patent frontal outflow tract bilaterally   no polyps or exudate     The patient tolerated the procedure well.

## 2018-02-22 ENCOUNTER — OFFICE VISIT (OUTPATIENT)
Dept: ALLERGY | Facility: CLINIC | Age: 23
End: 2018-02-22
Payer: COMMERCIAL

## 2018-02-22 VITALS
WEIGHT: 157.44 LBS | BODY MASS INDEX: 22.04 KG/M2 | SYSTOLIC BLOOD PRESSURE: 120 MMHG | DIASTOLIC BLOOD PRESSURE: 82 MMHG | HEIGHT: 71 IN | HEART RATE: 76 BPM

## 2018-02-22 DIAGNOSIS — L30.9 ECZEMA, UNSPECIFIED TYPE: ICD-10-CM

## 2018-02-22 DIAGNOSIS — Z87.09 HISTORY OF NASAL POLYPOSIS: Chronic | ICD-10-CM

## 2018-02-22 DIAGNOSIS — J30.2 CHRONIC SEASONAL ALLERGIC RHINITIS DUE TO FUNGAL SPORES: Chronic | ICD-10-CM

## 2018-02-22 DIAGNOSIS — Z91.013 ALLERGY TO CRUSTACEANS: Chronic | ICD-10-CM

## 2018-02-22 DIAGNOSIS — J33.9 NASAL POLYPS: Chronic | ICD-10-CM

## 2018-02-22 DIAGNOSIS — J30.1 CHRONIC SEASONAL ALLERGIC RHINITIS DUE TO POLLEN: Chronic | ICD-10-CM

## 2018-02-22 DIAGNOSIS — J30.89 ALLERGIC RHINITIS DUE TO HOUSE DUST MITE: Chronic | ICD-10-CM

## 2018-02-22 DIAGNOSIS — J45.30 MILD PERSISTENT ASTHMA WITHOUT COMPLICATION: Primary | Chronic | ICD-10-CM

## 2018-02-22 PROCEDURE — 3008F BODY MASS INDEX DOCD: CPT | Mod: S$GLB,,, | Performed by: ALLERGY & IMMUNOLOGY

## 2018-02-22 PROCEDURE — 99204 OFFICE O/P NEW MOD 45 MIN: CPT | Mod: S$GLB,,, | Performed by: ALLERGY & IMMUNOLOGY

## 2018-02-22 PROCEDURE — 99999 PR PBB SHADOW E&M-EST. PATIENT-LVL III: CPT | Mod: PBBFAC,,, | Performed by: ALLERGY & IMMUNOLOGY

## 2018-02-22 RX ORDER — BUDESONIDE AND FORMOTEROL FUMARATE DIHYDRATE 160; 4.5 UG/1; UG/1
2 AEROSOL RESPIRATORY (INHALATION) EVERY 12 HOURS
Qty: 10.2 G | Refills: 11 | Status: SHIPPED | OUTPATIENT
Start: 2018-02-22 | End: 2018-10-28 | Stop reason: SDUPTHER

## 2018-02-22 RX ORDER — MONTELUKAST SODIUM 10 MG/1
10 TABLET ORAL NIGHTLY
Qty: 30 TABLET | Refills: 11 | Status: SHIPPED | OUTPATIENT
Start: 2018-02-22

## 2018-02-22 RX ORDER — FLUTICASONE PROPIONATE 50 MCG
2 SPRAY, SUSPENSION (ML) NASAL 2 TIMES DAILY
Qty: 2 BOTTLE | Refills: 11 | Status: SHIPPED | OUTPATIENT
Start: 2018-02-22

## 2018-02-22 NOTE — LETTER
February 22, 2018      Kelley Roberson MD  [address]  [address]  [place] [address]           [place] [facility]  [address]  [place] [address]  Phone: [phone]          Patient: Baron CALEB Shannon   MR Number: [id number]   YOB: 1995   Date of Visit: 2/22/2018       Dear Dr. Kelley Roberson:    Thank you for referring Baron Shannon to me for evaluation. Attached you will find relevant portions of my assessment and plan of care.    If you have questions, please do not hesitate to call me. I look forward to following Baron Shannon along with you.    Sincerely,    Mckenzie Glover MD    Enclosure  CC:  No Recipients    If you would like to receive this communication electronically, please contact [email or web address] or [phone] to request more information on [employer] Link access.    For providers and/or their staff who would like to refer a patient to [facility], please contact us through our one-stop-shop provider referral line, [facility], at [phone].    If you feel you have received this communication in error or would no longer like to receive these types of communications, please e-mail [email or web address]

## 2018-02-22 NOTE — LETTER
February 22, 2018        Kelley Roberson MD  [address]  [address]  [place] [address]             [place] [facility]  [address] [facility]  [place] [address]  Phone: [phone]   Patient: Baron CALEB Shannon   MR Number: [id number]   YOB: 1995   Date of Visit: 2/22/2018       Dear Dr. Vilma Roberson:    I saw your patient today for an initial evaluation with respect to the following conditions:    1. Mild persistent asthma with frequent use of beta agonist  budesonide-formoterol 160-4.5 mcg (SYMBICORT) 160-4.5 mcg/actuation HFAA   2. Chronic Allergic rhinitis due to house dust mite     3. Chronic seasonal allergic rhinitis due to pollen  montelukast (SINGULAIR) 10 mg tablet    fluticasone (FLONASE) 50 mcg/actuation nasal spray   4. Chronic seasonal allergic rhinitis due to fungal spores     5. History of nasal polyposis     6. Nasal polyps: S/P polypectomy 1/9/2018  fluticasone (FLONASE) 50 mcg/actuation nasal spray   7. Eczema, unspecified type     8. 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  Patient is to increase his Flonase to 2 sprays in each nostril twice a day and to continue this long-term because of his history of nasal polyposis.  2.  Patient is to start Symbicort 160/4.5 HFA 2 puffs inhaled every a.m.; I am choosing Symbicort versus Dulera because Dulera is not covered by his insurance plan  3.  Patient is to use his albuterol prior to exercise if needed; Symbicort in the a.m. and will prevent his exercise induced symptoms without any additional albuterol.  4.  Patient is also to continue Singulair 10 mg every 24 hours for now  5.  Patient is to return in the near future for skin testing as he desires to start allergen specific immunotherapy.  6.  Patient is to avoid the ingestion of crustacea as well as to avoid steam from crustacea boils.  7.  Patient  understands that I will retire in June 2018 but he will continue his treatment plan with Dr. Meadows after I retire.  Patient understands that I will be available until the end of June 2018.        I have requested a follow-up visit in 2 weeks  to assess this patient's progress.    I hope you find this information helpful in the care of your patient. If you have questions about the above, please do not hesitate to contact me.    Sincerely,    MD JEMMA Torres MD [place]

## 2018-02-23 NOTE — PATIENT INSTRUCTIONS
1.  Patient is to increase his Flonase to 2 sprays in each nostril twice a day and to continue this long-term because of his history of nasal polyposis.  2.  Patient is to start Symbicort 160/4.5 HFA 2 puffs inhaled every a.m.; I am choosing Symbicort versus Dulera because Dulera is not covered by his insurance plan  3.  Patient is to use his albuterol prior to exercise if needed; Symbicort in the a.m. and will prevent his exercise induced symptoms without any additional albuterol.  4.  Patient is also to continue Singulair 10 mg every 24 hours for now  5.  Patient is to return in the near future for skin testing as he desires to start allergen specific immunotherapy.  6.  Patient is to avoid the ingestion of crustacea as well as to avoid steam from crustacea boils.  7.  Patient understands that I will retire in June 2018 but he will continue his treatment plan with Dr. Meadows after I retire.  Patient understands that I will be available until the end of June 2018.

## 2018-02-23 NOTE — PROGRESS NOTES
Referring physician: Dr. Kelley Stacy*    Primary Care Physician: Marisol Coles MD    Chief Complaint: Asthma (referred by Dr. Esparza, ENT at [facility].  Had nasal polyps removed 1/12/18.)    Patient is new to me.    Patient is not accompanied    Subjective:         HPI    Baron CALEB Shannon is a 22 y.o. male referred for evaluation of chronic nasal symptoms, nasal polyposis, and asthma.  Patient reports that he is a lifelong history of chronic nasal symptoms as well as recurrent asthma symptoms.  He reports that since his sinus surgery and polypectomy on 1/9/2018 his symptoms are improved but he still has nasal congestion and decreased sense of smell.  He also states that he is a chronic mouth breather.  His ACT score today is 18.  He states that he has to use his albuterol inhaler 2-3 times a week for shortness of breath and wheezing; usually starts with sneezing and then his lower respiratory tract symptoms begins.  He denies any nocturnal asthma but does have a history of exercise-induced asthma; therefore he uses his albuterol prior to exercise with benefit.  He states that in the past he has been on Dulera with benefit.  He states that his symptoms are year-round but worse with changes in the weather, being outside long-term, and during the spring and fall.  His current medications include Flonase 1 spray in each nostril twice a day and albuterol as needed.  He is currently finishing a prednisone taper; today he is on 20 mg in the morning for 3 days to be followed by one half in the morning for 3 days and stopping.  Patient reports throat itching when exposed to steam from boiling crustacea including shrimp, crawfish, crab, and lobster.  He denies problems with fin fish or mollusks      Summary of visit on 1/12/2018 with Dr. Vilma Roberson:  Baron CALEB Shannon is a 22 y.o. male who comes for follow-up 4 days status-post endoscopic sinus surgery.  He is here today for packing removal. He states the  is doing fairly well. He is off of narcotics and is using nasal saline rinses.  Assessment:  Doing well following bilateral endoscopic sinus surgery.  He also underwent SMRT.   1. Other chronic sinusitis    2. Nasal polyposis    3. Non-seasonal allergic rhinitis due to other allergic trigger, unspecified chronicity    4. Hypertrophy of inferior nasal turbinate    Plan:  Nasal saline irrigations q 4-6 hours.   Follow-up in about 4 days (around 1/16/2018).     Summary of visit on 11/9/2017 with Dr. Hatch:  Baron CALEB Shannon is 22 y.o. male who follows up for left AOM with TM rupture.  He reports that his left ear is feeling better since last visit. He notes the bloody otorrhea has stopped. He feels his hearing is back to baseline. He denies ear pain.   He notes that he has had a lifelong history of nasal obstruction bilaterally. He notes this is associated with nasal congestion, hyposmia, nasal rhinorrhea, and post nasal drip. He notes a mild bilateral facial pressure in the ethmoid region bilaterally. He states that he has had positive allergy testing as a child. He has a history of Asthma and eczema.    Assessment:  1. Non-seasonal allergic rhinitis due to other allergic trigger, unspecified chronicity J30.89 477.8 Aspergillus fumagatus IgE         Bermuda grass IgE         Cat epithelium IgE         Cladosporium IgE         Cockroach, American IgE         [place], bald IgE         D. farinae IgE         D. pteronyssinus IgE         Dog dander IgE         Prashant grass IgE         Marsh elder, rough IgE         Oak, white IgE         Penicillium IgE         Ragweed, short, common IgE         [place] IgE         Allergen, Elm [place]         Allergen, Meadow Grass ([facility] Blue)         Allergen, Mucor Racemosus         Allergen, Pecan [place] IgE         Allergen-Alternaria Alternata         Allergen-[place]         Allergen-Common Pigweed         Feather Panel #2         RAST Allergen Maple ([place])          RAST Allergen [place]   2. Other chronic sinusitis J32.8 473.8 CT Medtronic Sinuses without   3. Nasal polyposis J33.9 471.9 CT Medtronic Sinuses without   4. Acute suppurative otitis media of left ear with spontaneous rupture of tympanic membrane, recurrence not specified      Plan:  Continue Singulair, start xyzal, and Flonase. Follow up after diagnostic testing is completed    Emergency room visit on 10/17/2017:  Assessment:  1. Mild intermittent asthma with acute exacerbation    Plan:  -     betamethasone acetate-betamethasone sodium phosphate injection 9 mg; Inject 1.5 mLs (9 mg total) into the muscle one time.  -     amoxicillin (AMOXIL) 500 MG capsule; Take 2 capsules (1,000 mg total) by mouth every 12 (twelve) hours.  Dispense: 28 capsule; Refill: 0  -     albuterol 90 mcg/actuation inhaler; Inhale 2 puffs into the lungs every 6 (six) hours as needed for Wheezing or Shortness of Breath. Rescue  Dispense: 1 Inhaler; Refill: 0  -     predniSONE (DELTASONE) 20 MG tablet; Take 2 tablets (40 mg total) by mouth once daily.  Dispense: 6 tablet; Refill: 0  -     albuterol (PROVENTIL) 2.5 mg /3 mL (0.083 %) nebulizer solution; Take 3 mLs (2.5 mg total) by nebulization every 6 (six) hours as needed for Wheezing. Rescue  Dispense: 1 Box; Refill: 0        Review of Systems  Constitutional: Negative for changes in appetite, unintentional weight loss, fever, chills and fatigue.   HENT: Negative for facial pain, nose bleeds, postnasal drip, throat clearing, sinus pressure, and voice change. Negative for ear discharge, ear pain, facial swelling, sore throat and trouble swallowing.  Positive for nasal congestion and mouth breathing.  Also positive for decreased sense of smell.  Eyes: Negative for occular discharge, redness, itching and visual disturbance.  Respiratory: Negative for chest tightness, sputum production and cough.  Positive for shortness of breath and wheezing as well as exercise-induced  asthma.  Cardiovascular: Negative for chest pain, palpatations and leg swelling.  Gastrointestinal: Negative for abdominal distension, abdominal pain, constipation, diarrhea, nausea,and vomiting.   Genitourinary: Negative for difficulty urinating.   Musculoskeletal: Negative for arthralgias, gait problems, joint swelling, myalgias and back pain.   Neurological: Negative for dizziness, syncope, weakness, light-headedness, and headaches.   Hematological: Negative for adenopathy, does not bruise or bleed easily.  Psychiatric/Behavioral: Negative for agitation, anxiety, behavioral problems, confusion, and insomnia.  Skin: Negative for rash.     PMH:  Reviewed with the patient and current for this visit    Family History:  Reviewed with the patient and current for this visit    Social History:  Reviewed with the patient and current for this visit     Environmental History:  Reviewed with the patient and current for this visit          Objective:      Skin Test results: Patient has never been prick skin tested for aeroallergens.    Immunotherapy: Patient has never been on allergen specific immunotherapy.    Physical Exam  General:patient is well developed and well nourished, in no acute distress.  Mental Status:  Alert, oriented and cooperative  Head and Face: normocephalic   Allergic shiners: No  Eyes:   Pupils: ERRLA: Scleral conjunctiva: clear; Cornea: clear; Palprebal conjunctiva: normal: Eyelid Skin: normal  Ears:Tympanic membrane Left:intact and normal light reflex; Right:intact and normal light reflex; External canals normal bilaterally.  Nose:  Nares: patent; Mucosa :pink and moist; Nasal septum: midline;Turbinates are of normal size bilaterally and do not occlude the nasal airway.  Mouth/Pharynx: tonsils present; posterior pharyngeal wall normal; tongue normal; teeth normal; voice quality normal.  Neck:  Cervical lymph nodes: small, non-tender, freely moveable both anterior and posterior cervical chain; Trachea:  midline; Masses: none  Lungs: Air movement is good; respiratory effort is good; no respiratory distress; breath sounds are vesicular in all lung fields; no wheezing; normal expiratory time; no cough.  Heart: regular rate and rhythm with mild respiratory variation; A1 and P2 are normal; no murmurs or gallops.  Abdomen:exam not done  Extremities: no cyanosis, clubbing or edema  Skin:no rashes or lesions present; skin hydrated and supple.    Skin Test Results: Prick skin testing was deferred for today.    Laboratory Results:  Component      Latest Ref Rng & Units 11/9/2017 11/9/2017 11/9/2017           5:11 PM  5:11 PM  5:11 PM   Aspergillus Fumigatus IgE      <0.35 kU/L   3.39 (H)   A. fumigatus Class         CLASS II   [place] Grass      <0.35 kU/L   3.82 (H)   [place] Grass Class         CLASS III   Cat Dander      <0.35 kU/L   <0.35   Cat Epithelium Class         CLASS 0   Cladosporium, IgE      <0.35 kU/L   0.65 (H)   Cladosporium Class         CLASS I   Cockroach, IgE      <0.35 kU/L CLASS III 9.77 (H)    [place]      <0.35 kU/L   0.66 (H)   Bald [place] Class         CLASS I   D. farinae      <0.35 kU/L   7.80 (H)   D. farinae Class         CLASS III   Mite Dust Pteronyssinus IgE      <0.35 kU/L   6.39 (H)   D. pteronyssinus Class         CLASS III   Dog Dander, IgE      <0.35 kU/L   <0.35   Dog Dander Class         CLASS 0   Prashant Grass      <0.35 kU/L   5.13 (H)   Prashant Grass Class         CLASS III   Marshelder IgE      <0.35 kU/L   1.27 (H)   Marshelder Class         CLASS II   White Oak(Quercus alba) IgE      <0.35 kU/L   12.60 (H)   [place], Class         CLASS III   Penicillium, IgE      <0.35 kU/L   1.74 (H)   Penicillium Class         CLASS II   Ragweed, Short, IgE      <0.35 kU/L   2.99 (H)   Ragweed, Short, Class         CLASS II   [place] Grass      <0.35 kU/L   7.63 (H)   [place] Grass Class         CLASS III   Elm [place], IgE      <0.35 kU/L   1.14 (H)   Elm [place] Class         CLASS II   [place]  Grass ([place] Blue), IgE      <0.35 kU/L   8.93 (H)   Meadow Grass ([place] Blue) Class         CLASS III   Mucor racemosus, IgE      <0.35 kU/L   <0.35   Mucor racemosus Class         CLASS 0   Pecan [place] Tree      <0.35 kU/L   2.07 (H)   Pecan, Class         CLASS II   Alternaria alternata      <0.35 kU/L   7.51 (H)   Altern. alternata Class         CLASS III   Valley IgE      <0.35 kU/L   <0.35   [place]ley Class         CLASS 0   Common Pigweed IgE      <0.35 kU/L   6.50 (H)   Common Pigweed Class         CLASS III   Allergen Maple ([place]) IgE      <0.35 kU/L   5.50 (H)   Allergen Maple ([place]) Class         CLASS III   Allergen [place] IgE      <0.35 kU/L   <0.35   Allergen [place] Class         CLASS 0   Feather Panel #2      kU/L   <0.35     Component      Latest Ref Rng & Units 2/4/2008   IgG 1      400 - 1,150 mg/dL 438   IgG 2      98 - 480 mg/dL 161   IgG 3      15 - 149.0 mg/dL 27.9   IgG 4      3 - 210.0 mg/dL 129.0   Total IgG      638 - 1,453 mg/dL 617 (L)   D. farinae      kU/L 15.6   Mite Dust Pteronyssinus IgE      kU/L 11.8   [place] Grass      kU/L 14.9   Prashant Grass      kU/L 8.11   [place] Grass      kU/L 7.12   IgA      84 - 480 mg/dl 193   IgE      0 - 378 U/ml 418 (H)   IgM      60 - 345 mg/dl 117   A. tenius, IgE      kU/L 2.43   Aspergillus Fumigatus IgE      kU/L 1.94   White Oak(Quercus alba) IgE      kU/L 15.6   [place], IgE      kU/L 3.48   Marshelder IgE      kU/L 2.05   Ragweed, Short, IgE      kU/L 7.82   Peanut IgE      kU/L 1.33   Soybean IgE      kU/L 6.31   Cockroach, IgE      kU/L 16.5   Egg Yolk      kU/L <0.35   Egg White      kU/L 0.57   Shrimp IgE      kU/L >100.0     Radiology Results:  CT of the sinus done 11/16/2017:  There is diffuse opacification of bilateral inferior ethmoid air cells.  There is mucous thickening in bilateral maxillary sinuses measuring 4 mm in greatest thickness.  The mastoid air cells are clear.  There is mucous thickening in the  anterior sphenoid sinuses.  The frontal sinuses are clear.  Bilateral OMC complexes are occluded.  Bilateral frontoethmoidal recesses are occluded.  The osseous structures are unremarkable.   Impression    There is evidence of paranasal sinus disease as described above.           Assessment:       1. Mild persistent asthma with frequent use of beta agonist  budesonide-formoterol 160-4.5 mcg (SYMBICORT) 160-4.5 mcg/actuation HFAA   2. Chronic Allergic rhinitis due to house dust mite     3. Chronic seasonal allergic rhinitis due to pollen  montelukast (SINGULAIR) 10 mg tablet    fluticasone (FLONASE) 50 mcg/actuation nasal spray   4. Chronic seasonal allergic rhinitis due to fungal spores     5. History of nasal polyposis     6. Nasal polyps: S/P polypectomy 1/9/2018  fluticasone (FLONASE) 50 mcg/actuation nasal spray   7. Eczema, unspecified type     8. 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    Immunotherapy: No; but consider at a later date.    Lab or X-ray: No    Outside medical records requested: No        Patient Instructions   1.  Patient is to increase his Flonase to 2 sprays in each nostril twice a day and to continue this long-term because of his history of nasal polyposis.  2.  Patient is to start Symbicort 160/4.5 HFA 2 puffs inhaled every a.m.; I am choosing Symbicort versus Dulera because Dulera is not covered by his insurance plan  3.  Patient is to use his albuterol prior to exercise if needed; Symbicort in the a.m. and will prevent his exercise induced symptoms without any additional albuterol.  4.  Patient is also to continue Singulair 10 mg every 24 hours for now  5.  Patient is to return in the near future for skin testing as he desires to start allergen specific immunotherapy.  6.  Patient is to avoid the ingestion of crustacea as well as to avoid steam from crustacea boils.  7.   Patient understands that I will retire in June 2018 but he will continue his treatment plan with Dr. Meadows after I retire.  Patient understands that I will be available until the end of June 2018.      40 minutes spent face to face with this patient. 50% of time spent counseling this patient about the medical conditions (pathophysiology), the options and strategies for treatments, and the risks and benefits of treatments.    Patient education content included: Nasal sinus physiology reviewed.  The role of inflammatory changes in symptom production was reviewed.  The role of sinusitis in producing postnasal drip and cough was reviewed.  The role of sinobronchial pathways in producing cough was reviewed. The role of reflux disease or gastroesophageal or laryngeal pharyngeal in producing cough was reviewed.  The role of using anti-inflammatory drugs to reduce the level of inflammation and thus the symptoms was reviewed.  The role of nasal steroids in preventing regrowth of nasal polyps was also reviewed with the patient.  The handout for rhinitis and sinusitis was reviewed with the patient.  Patient expressed understanding of these concepts and questions were answered.   Asthma pathophysiology was reviewed.  The role of inflammatory changes in the mucosal lining in producing smooth muscle contraction and thus airway narrowing was reviewed.  The role of an inhaled corticosteroids in reducing this airway inflammation was reviewed. Reduction of airway inflammation results in the reduction smooth muscle contraction and thus eliminates airway narrowing and compromise.  The use of albuterol as a rescue inhaler was reviewed; if asthma is well controlled, the issues should be less than 2 puffs inhaled every 2 weeks.  If more albuterol than this is needed, it is an indication that asthma is not well controlled.  The various triggers for asthma symptoms was reviewed including allergen exposure, irritant exposure, viral  infections, sinusitis, and reflux.  The handout for understanding asthma and asthma medications was reviewed with this patient.  The patient expressed understanding of these concepts and questions were answered.    Letter and copy of this visit sent to referring physician/PCP:            Mckenzie Glover MD

## 2018-03-14 RX ORDER — ALBUTEROL SULFATE 90 UG/1
2 AEROSOL, METERED RESPIRATORY (INHALATION) EVERY 4 HOURS PRN
Qty: 8.5 INHALER | Refills: 0 | Status: SHIPPED | OUTPATIENT
Start: 2018-03-14 | End: 2018-07-17 | Stop reason: SDUPTHER

## 2018-07-17 RX ORDER — ALBUTEROL SULFATE 90 UG/1
2 AEROSOL, METERED RESPIRATORY (INHALATION) EVERY 4 HOURS PRN
Qty: 8.5 INHALER | Refills: 0 | Status: SHIPPED | OUTPATIENT
Start: 2018-07-17 | End: 2018-08-20 | Stop reason: SDUPTHER

## 2018-08-08 ENCOUNTER — TELEPHONE (OUTPATIENT)
Dept: PEDIATRICS | Facility: CLINIC | Age: 23
End: 2018-08-08

## 2018-08-08 ENCOUNTER — OFFICE VISIT (OUTPATIENT)
Dept: URGENT CARE | Facility: CLINIC | Age: 23
End: 2018-08-08
Payer: COMMERCIAL

## 2018-08-08 VITALS
SYSTOLIC BLOOD PRESSURE: 121 MMHG | HEART RATE: 103 BPM | WEIGHT: 165 LBS | BODY MASS INDEX: 23.1 KG/M2 | TEMPERATURE: 98 F | OXYGEN SATURATION: 98 % | HEIGHT: 71 IN | DIASTOLIC BLOOD PRESSURE: 77 MMHG

## 2018-08-08 DIAGNOSIS — T14.8XXA PUNCTURE WOUND: Primary | ICD-10-CM

## 2018-08-08 PROCEDURE — 90714 TD VACC NO PRESV 7 YRS+ IM: CPT | Mod: S$GLB,,, | Performed by: PHYSICIAN ASSISTANT

## 2018-08-08 PROCEDURE — 90471 IMMUNIZATION ADMIN: CPT | Mod: S$GLB,,, | Performed by: PHYSICIAN ASSISTANT

## 2018-08-08 PROCEDURE — 99214 OFFICE O/P EST MOD 30 MIN: CPT | Mod: 25,S$GLB,, | Performed by: PHYSICIAN ASSISTANT

## 2018-08-08 PROCEDURE — 3008F BODY MASS INDEX DOCD: CPT | Mod: CPTII,S$GLB,, | Performed by: PHYSICIAN ASSISTANT

## 2018-08-08 RX ORDER — MUPIROCIN 20 MG/G
OINTMENT TOPICAL
Qty: 1 TUBE | Refills: 0 | Status: SHIPPED | OUTPATIENT
Start: 2018-08-08

## 2018-08-08 NOTE — PROGRESS NOTES
"Subjective:       Patient ID: Baron CALEB Shannon is a 23 y.o. male.    Vitals:  height is 5' 11" (1.803 m) and weight is 74.8 kg (165 lb). His oral temperature is 98.4 °F (36.9 °C). His blood pressure is 121/77 and his pulse is 103. His oxygen saturation is 98%.     Chief Complaint: Finger Injury (Left middle and ring fingers) and Toe Injury (Right big toe and Left second toe)    Patient states that he fell onto a tack strip at work at approx. 9am this morning and punctured left middle and ring fingers, left second two, and right big toe. Patient reports cleaning and dressing wounds, no pain or bleeding currently. Patient is seeking tetnus injection.      Toe Injury   This is a new problem. The current episode started today (approx. 9am today 8/8/18). The problem has been resolved (No pain anymore). Pertinent negatives include no abdominal pain, chest pain, neck pain, numbness, rash or weakness. Nothing aggravates the symptoms. Treatments tried: Cleaned with Hydrogen Peroxide.   Hand Pain    The incident occurred 6 to 12 hours ago. The incident occurred at work. The injury mechanism was a fall (puncture wound on figers). Pain location: left 3rd and 4th digits. The pain does not radiate. The pain is at a severity of 0/10. The pain has been improving since the incident. Pertinent negatives include no chest pain, muscle weakness, numbness or tingling. Nothing aggravates the symptoms. He has tried nothing for the symptoms.     Review of Systems   Constitution: Negative for weakness and malaise/fatigue.   HENT: Negative for nosebleeds.    Cardiovascular: Negative for chest pain and syncope.   Respiratory: Negative for shortness of breath.    Skin: Negative for color change and rash.   Musculoskeletal: Positive for falls and joint pain. Negative for back pain and neck pain.   Gastrointestinal: Negative for abdominal pain.   Genitourinary: Negative for hematuria.   Neurological: Negative for dizziness, numbness and tingling. "       Objective:      Physical Exam   Constitutional: He is oriented to person, place, and time. He appears well-developed and well-nourished. No distress.   HENT:   Head: Normocephalic and atraumatic.   Right Ear: External ear normal.   Left Ear: External ear normal.   Nose: Nose normal.   Eyes: Conjunctivae and EOM are normal. Right eye exhibits no discharge. Left eye exhibits no discharge.   Neck: Normal range of motion. Neck supple.   Cardiovascular: Normal rate, regular rhythm and normal heart sounds.  Exam reveals no gallop and no friction rub.    No murmur heard.  Pulmonary/Chest: Effort normal and breath sounds normal. No respiratory distress. He has no decreased breath sounds. He has no wheezes. He has no rhonchi. He has no rales.   Musculoskeletal: Normal range of motion.        Hands:       Feet:    Neurological: He is alert and oriented to person, place, and time.   Skin: Skin is warm and dry. No rash noted. He is not diaphoretic. No erythema.   Puncture wounds on 3rd and 4th fingers of left hand, 1st digit of right foot, and 2nd digit of left foot; no active bleeding; no swelling; no surrounding erythema   Psychiatric: He has a normal mood and affect. His behavior is normal.   Nursing note and vitals reviewed.      Assessment:       1. Puncture wound        Plan:         Puncture wound  -     (In Office Administered) Td Vaccine  -     mupirocin (BACTROBAN) 2 % ointment; Apply to affected area 3 times daily.  Dispense: 1 Tube; Refill: 0      Patient Instructions   Apply antibiotic ointment to affected areas twice daily for the next three days.    Return to clinic for any signs of infection (surrounding redness, pus drainage, swelling, pain, fever).    Please follow up with your primary care provider within 2-5 days if your signs and symptoms have not resolved or worsen.     If your condition worsens or fails to improve we recommend that you receive another evaluation at the emergency room immediately or  contact your primary medical clinic to discuss your concerns.   You must understand that you have received an Urgent Care treatment only and that you may be released before all of your medical problems are known or treated. You, the patient, will arrange for follow up care as instructed.         Puncture Wound       A puncture wound occurs when a pointed object pushes into the skin. It may go into the tissues below the skin, including fat and muscle. This type of wound is narrow and deep and can be hard to clean. Because of this, puncture wounds are at high risk for becoming infected.  X-rays may be done to check the wound for objects stuck under the skin. Your may also need a tetanus shot. This is given if you are not up to date on this vaccination and the object that caused the wound may lead to tetanus.  Home care  · Your healthcare provider may prescribe an antibiotic. This is to help prevent infection. Follow all instructions for taking this medicine. Take the medicine every day until it is gone or you are told to stop. You should not have any left over.  · The healthcare provider may prescribe medicines for pain. Follow instructions for taking them.  · You can take acetaminophen or ibuprofen for pain, unless you were given a different pain medicine to use.   · Follow the healthcare providers instructions on how to care for the wound.  · Keep the wound clean and dry. Do not get the wound wet until you are told it is okay to do so. If the area gets wet, gently pat it dry with a clean cloth. Replace the wet bandage with a dry one.  · If a bandage was applied and it becomes wet or dirty, replace it. Otherwise, leave it in place for the first 24 hours.  · Once you can get the wound wet, you may shower as usual but do not soak the wound in water (no tub baths or swimming)  · Even with proper treatment, a puncture wound may become infected. Check the wound daily for signs of infection listed below.  Follow-up  care  Follow up with your healthcare provider as advised.   When to seek medical advice  Call your healthcare provider right away if any of these occur:  · Signs of infection, including:  ¨ Increasing redness or swelling around the wound  ¨ Increased warmth of the wound  ¨ Worsening pain  ¨ Red streaking lines away from the wound  ¨ Draining pus  · Fever of 100.4°F (38.ºC) or higher or as directed by your healthcare provider  · Wound changes colors  · Numbness around the wound  · Decreased movement around the injured area  Date Last Reviewed: 8/24/2015 © 2000-2017 [employer]. [address] [place] [address]. All rights reserved. This information is not intended as a substitute for professional medical care. Always follow your healthcare professional's instructions.

## 2018-08-08 NOTE — TELEPHONE ENCOUNTER
----- Message from Christina Wallis sent at 8/8/2018  9:26 AM CDT -----  Contact: Mom Beatriz [phone]  Mom stated the pt had a nail in his hand but its out. Mom would like to know the last tetanus shot the pt had. Please call mom to advise ------- Cristofer Henderson [phone]

## 2018-08-08 NOTE — PATIENT INSTRUCTIONS
Apply antibiotic ointment to affected areas twice daily for the next three days.    Return to clinic for any signs of infection (surrounding redness, pus drainage, swelling, pain, fever).    Please follow up with your primary care provider within 2-5 days if your signs and symptoms have not resolved or worsen.     If your condition worsens or fails to improve we recommend that you receive another evaluation at the emergency room immediately or contact your primary medical clinic to discuss your concerns.   You must understand that you have received an Urgent Care treatment only and that you may be released before all of your medical problems are known or treated. You, the patient, will arrange for follow up care as instructed.         Puncture Wound       A puncture wound occurs when a pointed object pushes into the skin. It may go into the tissues below the skin, including fat and muscle. This type of wound is narrow and deep and can be hard to clean. Because of this, puncture wounds are at high risk for becoming infected.  X-rays may be done to check the wound for objects stuck under the skin. Your may also need a tetanus shot. This is given if you are not up to date on this vaccination and the object that caused the wound may lead to tetanus.  Home care  · Your healthcare provider may prescribe an antibiotic. This is to help prevent infection. Follow all instructions for taking this medicine. Take the medicine every day until it is gone or you are told to stop. You should not have any left over.  · The healthcare provider may prescribe medicines for pain. Follow instructions for taking them.  · You can take acetaminophen or ibuprofen for pain, unless you were given a different pain medicine to use.   · Follow the healthcare providers instructions on how to care for the wound.  · Keep the wound clean and dry. Do not get the wound wet until you are told it is okay to do so. If the area gets wet, gently pat it dry  with a clean cloth. Replace the wet bandage with a dry one.  · If a bandage was applied and it becomes wet or dirty, replace it. Otherwise, leave it in place for the first 24 hours.  · Once you can get the wound wet, you may shower as usual but do not soak the wound in water (no tub baths or swimming)  · Even with proper treatment, a puncture wound may become infected. Check the wound daily for signs of infection listed below.  Follow-up care  Follow up with your healthcare provider as advised.   When to seek medical advice  Call your healthcare provider right away if any of these occur:  · Signs of infection, including:  ¨ Increasing redness or swelling around the wound  ¨ Increased warmth of the wound  ¨ Worsening pain  ¨ Red streaking lines away from the wound  ¨ Draining pus  · Fever of 100.4°F (38.ºC) or higher or as directed by your healthcare provider  · Wound changes colors  · Numbness around the wound  · Decreased movement around the injured area  Date Last Reviewed: 8/24/2015  © 2409-4502 [employer] turn off [employer], [employer]. [address] [place] [address], [place] [address]. All rights reserved. This information is not intended as a substitute for professional medical care. Always follow your healthcare professional's instructions.

## 2018-08-14 ENCOUNTER — TELEPHONE (OUTPATIENT)
Dept: URGENT CARE | Facility: CLINIC | Age: 23
End: 2018-08-14

## 2018-08-20 RX ORDER — ALBUTEROL SULFATE 90 UG/1
2 AEROSOL, METERED RESPIRATORY (INHALATION) EVERY 4 HOURS PRN
Qty: 8.5 INHALER | Refills: 0 | Status: SHIPPED | OUTPATIENT
Start: 2018-08-20 | End: 2018-10-28 | Stop reason: SDUPTHER

## 2018-10-16 RX ORDER — ALBUTEROL SULFATE 90 UG/1
2 AEROSOL, METERED RESPIRATORY (INHALATION) EVERY 4 HOURS PRN
Qty: 8.5 INHALER | Refills: 0 | Status: CANCELLED | OUTPATIENT
Start: 2018-10-16

## 2018-10-19 RX ORDER — ALBUTEROL SULFATE 90 UG/1
2 AEROSOL, METERED RESPIRATORY (INHALATION) EVERY 4 HOURS PRN
Qty: 8.5 INHALER | Refills: 0 | Status: CANCELLED | OUTPATIENT
Start: 2018-10-19

## 2018-10-19 NOTE — TELEPHONE ENCOUNTER
Spoke with patient he was notified as per Dr Esparza this is the last time she can refill the Albuterol. Notified patient he needs to get this medication filled by a PCP or pulmonologist. Patient states he will call either one to set up an appointment.

## 2018-10-19 NOTE — TELEPHONE ENCOUNTER
----- Message from Lubna Calvin sent at 10/19/2018 10:43 AM CDT -----  Contact: [phone]/self  Patient is at his pharmacy waiting for the doctor to ok his refill. Please advise.

## 2018-10-28 ENCOUNTER — OFFICE VISIT (OUTPATIENT)
Dept: URGENT CARE | Facility: CLINIC | Age: 23
End: 2018-10-28
Payer: COMMERCIAL

## 2018-10-28 VITALS
DIASTOLIC BLOOD PRESSURE: 79 MMHG | RESPIRATION RATE: 18 BRPM | HEIGHT: 71 IN | HEART RATE: 61 BPM | BODY MASS INDEX: 23.1 KG/M2 | TEMPERATURE: 98 F | SYSTOLIC BLOOD PRESSURE: 122 MMHG | OXYGEN SATURATION: 99 % | WEIGHT: 165 LBS

## 2018-10-28 DIAGNOSIS — J45.30 MILD PERSISTENT ASTHMA WITHOUT COMPLICATION: Primary | Chronic | ICD-10-CM

## 2018-10-28 PROCEDURE — 3008F BODY MASS INDEX DOCD: CPT | Mod: CPTII,S$GLB,, | Performed by: FAMILY MEDICINE

## 2018-10-28 PROCEDURE — 99213 OFFICE O/P EST LOW 20 MIN: CPT | Mod: S$GLB,,, | Performed by: FAMILY MEDICINE

## 2018-10-28 RX ORDER — ALBUTEROL SULFATE 90 UG/1
2 AEROSOL, METERED RESPIRATORY (INHALATION) EVERY 4 HOURS PRN
Qty: 8.5 INHALER | Refills: 0 | Status: SHIPPED | OUTPATIENT
Start: 2018-10-28 | End: 2018-10-28 | Stop reason: SDUPTHER

## 2018-10-28 RX ORDER — BUDESONIDE AND FORMOTEROL FUMARATE DIHYDRATE 160; 4.5 UG/1; UG/1
2 AEROSOL RESPIRATORY (INHALATION) EVERY 12 HOURS
Qty: 10.2 G | Refills: 11 | Status: SHIPPED | OUTPATIENT
Start: 2018-10-28

## 2018-10-28 RX ORDER — ALBUTEROL SULFATE 0.83 MG/ML
SOLUTION RESPIRATORY (INHALATION)
Qty: 50 ML | Refills: 3 | Status: SHIPPED | OUTPATIENT
Start: 2018-10-28 | End: 2019-10-17 | Stop reason: SDUPTHER

## 2018-10-28 RX ORDER — ALBUTEROL SULFATE 90 UG/1
2 AEROSOL, METERED RESPIRATORY (INHALATION) EVERY 4 HOURS PRN
Qty: 8.5 INHALER | Refills: 3 | Status: SHIPPED | OUTPATIENT
Start: 2018-10-28

## 2018-10-28 RX ORDER — BUDESONIDE AND FORMOTEROL FUMARATE DIHYDRATE 160; 4.5 UG/1; UG/1
2 AEROSOL RESPIRATORY (INHALATION) EVERY 12 HOURS
Qty: 10.2 G | Refills: 11 | Status: SHIPPED | OUTPATIENT
Start: 2018-10-28 | End: 2018-10-28 | Stop reason: SDUPTHER

## 2018-10-28 NOTE — PROGRESS NOTES
"Subjective:       Patient ID: Baron CALEB Shannon is a 23 y.o. male.    Vitals:  height is 5' 11" (1.803 m) and weight is 74.8 kg (165 lb). His temperature is 97.8 °F (36.6 °C). His blood pressure is 122/79 and his pulse is 61. His respiration is 18 and oxygen saturation is 99%.     Chief Complaint: Medication Refill    Pt needs Rx  refill, for pro air inhaler and neb solution. Some cough and congestion, no fever or chills      Medication Refill   This is a new problem. The current episode started today. Pertinent negatives include no abdominal pain, chest pain, chills, fever, headaches, nausea, rash, sore throat or vomiting.     Review of Systems   Constitution: Negative for chills and fever.   HENT: Negative for sore throat.    Eyes: Negative for blurred vision.   Cardiovascular: Negative for chest pain.   Respiratory: Negative for shortness of breath.    Skin: Negative for rash.   Musculoskeletal: Negative for back pain and joint pain.   Gastrointestinal: Negative for abdominal pain, diarrhea, nausea and vomiting.   Neurological: Negative for headaches.   Psychiatric/Behavioral: The patient is not nervous/anxious.    All other systems reviewed and are negative.      Objective:      Physical Exam   Constitutional: He is oriented to person, place, and time. He appears well-developed and well-nourished. He is cooperative.  Non-toxic appearance. He does not appear ill. No distress.   HENT:   Head: Normocephalic and atraumatic.   Right Ear: Hearing, tympanic membrane, external ear and ear canal normal.   Left Ear: Hearing, tympanic membrane, external ear and ear canal normal.   Nose: Nose normal. No mucosal edema, rhinorrhea or nasal deformity. No epistaxis. Right sinus exhibits no maxillary sinus tenderness and no frontal sinus tenderness. Left sinus exhibits no maxillary sinus tenderness and no frontal sinus tenderness.   Mouth/Throat: Uvula is midline, oropharynx is clear and moist and mucous membranes are normal. No " trismus in the jaw. Normal dentition. No uvula swelling. No posterior oropharyngeal erythema.   Eyes: Conjunctivae and lids are normal. Right eye exhibits no discharge. Left eye exhibits no discharge.   Sclera clear bilat   Neck: Normal range of motion, full passive range of motion without pain and phonation normal. Neck supple.   Cardiovascular: Normal rate, regular rhythm, normal heart sounds, intact distal pulses and normal pulses. Exam reveals no friction rub.   No murmur heard.  Pulmonary/Chest: Effort normal. Stridor present. He has wheezes. He has no rales.   Abdominal: Soft. Normal appearance and bowel sounds are normal. He exhibits no distension, no pulsatile midline mass and no mass. There is no tenderness.   Musculoskeletal: Normal range of motion. He exhibits no edema or deformity.   Lymphadenopathy:     He has no cervical adenopathy.   Neurological: He is alert and oriented to person, place, and time. He exhibits normal muscle tone. Coordination normal.   Skin: Skin is warm, dry and intact. He is not diaphoretic. No pallor.   Psychiatric: He has a normal mood and affect. His speech is normal and behavior is normal. Judgment and thought content normal. Cognition and memory are normal.   Nursing note and vitals reviewed.      Assessment:       1. Mild persistent asthma with frequent use of beta agonist        Plan:         Mild persistent asthma with frequent use of beta agonist

## 2018-10-31 ENCOUNTER — TELEPHONE (OUTPATIENT)
Dept: URGENT CARE | Facility: CLINIC | Age: 23
End: 2018-10-31

## 2019-01-23 ENCOUNTER — OFFICE VISIT (OUTPATIENT)
Dept: URGENT CARE | Facility: CLINIC | Age: 24
End: 2019-01-23
Payer: COMMERCIAL

## 2019-01-23 VITALS
SYSTOLIC BLOOD PRESSURE: 126 MMHG | DIASTOLIC BLOOD PRESSURE: 84 MMHG | BODY MASS INDEX: 22.35 KG/M2 | HEART RATE: 87 BPM | HEIGHT: 72 IN | OXYGEN SATURATION: 98 % | WEIGHT: 165 LBS | TEMPERATURE: 98 F | RESPIRATION RATE: 16 BRPM

## 2019-01-23 DIAGNOSIS — J02.9 SORE THROAT: ICD-10-CM

## 2019-01-23 DIAGNOSIS — J06.9 UPPER RESPIRATORY INFECTION, VIRAL: Primary | ICD-10-CM

## 2019-01-23 DIAGNOSIS — R50.9 FEVER, UNSPECIFIED FEVER CAUSE: ICD-10-CM

## 2019-01-23 LAB
CTP QC/QA: YES
CTP QC/QA: YES
FLUAV AG NPH QL: NEGATIVE
FLUBV AG NPH QL: NEGATIVE
S PYO RRNA THROAT QL PROBE: NEGATIVE

## 2019-01-23 PROCEDURE — 87804 POCT INFLUENZA A/B: ICD-10-PCS | Mod: 59,QW,S$GLB, | Performed by: NURSE PRACTITIONER

## 2019-01-23 PROCEDURE — 87880 POCT RAPID STREP A: ICD-10-PCS | Mod: QW,S$GLB,, | Performed by: NURSE PRACTITIONER

## 2019-01-23 PROCEDURE — 3008F BODY MASS INDEX DOCD: CPT | Mod: CPTII,S$GLB,, | Performed by: NURSE PRACTITIONER

## 2019-01-23 PROCEDURE — 87880 STREP A ASSAY W/OPTIC: CPT | Mod: QW,S$GLB,, | Performed by: NURSE PRACTITIONER

## 2019-01-23 PROCEDURE — 99214 OFFICE O/P EST MOD 30 MIN: CPT | Mod: S$GLB,,, | Performed by: NURSE PRACTITIONER

## 2019-01-23 PROCEDURE — 87804 INFLUENZA ASSAY W/OPTIC: CPT | Mod: QW,S$GLB,, | Performed by: NURSE PRACTITIONER

## 2019-01-23 PROCEDURE — 99214 PR OFFICE/OUTPT VISIT, EST, LEVL IV, 30-39 MIN: ICD-10-PCS | Mod: S$GLB,,, | Performed by: NURSE PRACTITIONER

## 2019-01-23 PROCEDURE — 3008F PR BODY MASS INDEX (BMI) DOCUMENTED: ICD-10-PCS | Mod: CPTII,S$GLB,, | Performed by: NURSE PRACTITIONER

## 2019-01-23 RX ORDER — METHYLPREDNISOLONE 4 MG/1
TABLET ORAL
Qty: 1 PACKAGE | Refills: 0 | Status: SHIPPED | OUTPATIENT
Start: 2019-01-23 | End: 2019-10-17 | Stop reason: ALTCHOICE

## 2019-01-23 NOTE — PATIENT INSTRUCTIONS
Use an antihistamine such as Claritin, Zyrtec or Allegra to dry you out.     Use pseudoephedrine (behind the counter) to decongest. Pseudoephedrine  30 mg up to 240 mg /day. It can raise your blood pressure and give you palpitations.    Use mucinex (guaifenisin) to break up mucous up to 2400mg/day to loosen any mucous. The mucinex DM pill has a cough suppressant that can be used at night to stop the tickle at the back of your throat.    Use Nasal Saline to mechanically move any post nasal drip from your eustachian tube or from the back of your throat.    Use Afrin in each nare for no longer than 3 days, as it is addictive. It can also dry out your mucous membranes and cause elevated blood pressure.    Use Flonase 1-2 sprays/nostril per day. It is a local acting steroid nasal spray, if you develop a bloody nose, stop using the medication immediately.    Use warm salt water gargles to ease your throat pain. Warm salt water gargles as needed for sore throat-  1/2 tsp salt to 1 cup warm water, gargle as desired.    Sometimes Nyquil at night is beneficial to help you get some rest, however it is sedating and it does have an antihistamine, and tylenol.      Viral Upper Respiratory Illness (Adult)  You have a viral upper respiratory illness (URI), which is another term for the common cold. This illness is contagious during the first few days. It is spread through the air by coughing and sneezing. It may also be spread by direct contact (touching the sick person and then touching your own eyes, nose, or mouth). Frequent handwashing will decrease risk of spread. Most viral illnesses go away within 7 to 10 days with rest and simple home remedies. Sometimes the illness may last for several weeks. Antibiotics will not kill a virus, and they are generally not prescribed for this condition.    Home care  · If symptoms are severe, rest at home for the first 2 to 3 days. When you resume activity, don't let yourself get too  tired.  · Avoid being exposed to cigarette smoke (yours or others).  · You may use acetaminophen or ibuprofen to control pain and fever, unless another medicine was prescribed. (Note: If you have chronic liver or kidney disease, have ever had a stomach ulcer or gastrointestinal bleeding, or are taking blood-thinning medicines, talk with your healthcare provider before using these medicines.) Aspirin should never be given to anyone under 18 years of age who is ill with a viral infection or fever. It may cause severe liver or brain damage.  · Your appetite may be poor, so a light diet is fine. Avoid dehydration by drinking 6 to 8 glasses of fluids per day (water, soft drinks, juices, tea, or soup). Extra fluids will help loosen secretions in the nose and lungs.  · Over-the-counter cold medicines will not shorten the length of time youre sick, but they may be helpful for the following symptoms: cough, sore throat, and nasal and sinus congestion. (Note: Do not use decongestants if you have high blood pressure.)  Follow-up care  Follow up with your healthcare provider, or as advised.  When to seek medical advice  Call your healthcare provider right away if any of these occur:  · Cough with lots of colored sputum (mucus)  · Severe headache; face, neck, or ear pain  · Difficulty swallowing due to throat pain  · Fever of 100.4°F (38°C)  Call 911, or get immediate medical care  Call emergency services right away if any of these occur:  · Chest pain, shortness of breath, wheezing, or difficulty breathing  · Coughing up blood  · Inability to swallow due to throat pain  Date Last Reviewed: 9/13/2015 © 2000-2017 [employer]. [address] [place] [address], [place] [address]. All rights reserved. This information is not intended as a substitute for professional medical care. Always follow your healthcare professional's instructions.

## 2019-01-23 NOTE — LETTER
January 23, 2019      [facility] [place] [id number]  Phone: [phone]  Fax: [phone]       Patient: Baron Millie Shannon   YOB: 1995  Date of Visit: 01/23/2019    To Whom It May Concern:    Lavonne Shannon  was at [facility] on 01/23/2019. He may return to work/school on 1/24/19 with no restrictions. If you have any questions or concerns, or if I can be of further assistance, please do not hesitate to contact me.    Sincerely,    Jocelin Singer NP

## 2019-01-23 NOTE — PROGRESS NOTES
Subjective:       Patient ID: Baron CALBE Shannon is a 23 y.o. male.    Vitals:  height is 6' (1.829 m) and weight is 74.8 kg (165 lb). His oral temperature is 98.4 °F (36.9 °C). His blood pressure is 126/84 and his pulse is 87. His respiration is 16 and oxygen saturation is 98%.     Chief Complaint: Sore Throat    Patient states his symptoms started on 1/20/2019. Patient states his sister has strep.      Sore Throat    This is a new problem. The current episode started in the past 7 days. The problem has been gradually worsening. The maximum temperature recorded prior to his arrival was 100.4 - 100.9 F. The fever has been present for less than 1 day. The pain is at a severity of 4/10. The pain is moderate. Associated symptoms include congestion, coughing, diarrhea, ear pain, shortness of breath and trouble swallowing. Pertinent negatives include no stridor or vomiting. He has had exposure to strep. Treatments tried: nyquil. The treatment provided mild relief.       Constitution: Positive for fever. Negative for chills, sweating and fatigue.   HENT: Positive for ear pain, congestion, postnasal drip, sore throat and trouble swallowing. Negative for sinus pain, sinus pressure and voice change.    Neck: Negative for painful lymph nodes.   Eyes: Negative for eye redness.   Respiratory: Positive for cough, shortness of breath and wheezing. Negative for chest tightness, sputum production, bloody sputum, COPD, stridor and asthma.    Gastrointestinal: Positive for diarrhea. Negative for nausea and vomiting.   Musculoskeletal: Negative for muscle ache.   Skin: Negative for rash.   Allergic/Immunologic: Negative for seasonal allergies and asthma.   Hematologic/Lymphatic: Negative for swollen lymph nodes.       Objective:      Physical Exam   Constitutional: He is oriented to person, place, and time. He appears well-developed and well-nourished. He is cooperative.  Non-toxic appearance. He does not appear ill. No distress.    HENT:   Head: Normocephalic and atraumatic.   Right Ear: Hearing, tympanic membrane, external ear and ear canal normal.   Left Ear: Hearing, tympanic membrane, external ear and ear canal normal.   Nose: Nose normal. No mucosal edema, rhinorrhea or nasal deformity. No epistaxis. Right sinus exhibits no maxillary sinus tenderness and no frontal sinus tenderness. Left sinus exhibits no maxillary sinus tenderness and no frontal sinus tenderness.   Mouth/Throat: Uvula is midline and mucous membranes are normal. No trismus in the jaw. Normal dentition. No uvula swelling. Posterior oropharyngeal edema and posterior oropharyngeal erythema present.   Eyes: Conjunctivae and lids are normal. No scleral icterus.   Sclera clear bilat   Neck: Trachea normal, full passive range of motion without pain and phonation normal. Neck supple.   Cardiovascular: Normal rate, regular rhythm, normal heart sounds, intact distal pulses and normal pulses.   Pulmonary/Chest: Effort normal and breath sounds normal. No respiratory distress.   Cough present   Abdominal: Soft. Normal appearance and bowel sounds are normal. He exhibits no distension. There is no tenderness.   Musculoskeletal: Normal range of motion. He exhibits no edema or deformity.   Lymphadenopathy:     He has cervical adenopathy.        Right cervical: Superficial cervical adenopathy present.        Left cervical: Superficial cervical adenopathy present.   Neurological: He is alert and oriented to person, place, and time. He exhibits normal muscle tone. Coordination normal.   Skin: Skin is warm, dry and intact. He is not diaphoretic. No pallor.   Psychiatric: He has a normal mood and affect. His speech is normal and behavior is normal. Judgment and thought content normal. Cognition and memory are normal.   Nursing note and vitals reviewed.      Results for orders placed or performed in visit on 01/23/19   POCT rapid strep A   Result Value Ref Range    Rapid Strep A Screen  Negative Negative     Acceptable Yes    POCT Influenza A/B   Result Value Ref Range    Rapid Influenza A Ag Negative Negative    Rapid Influenza B Ag Negative Negative     Acceptable Yes      Assessment:       1. Upper respiratory infection, viral    2. Sore throat    3. Fever, unspecified fever cause        Plan:         Upper respiratory infection, viral  -     methylPREDNISolone (MEDROL DOSEPACK) 4 mg tablet; use as directed  Dispense: 1 Package; Refill: 0    Sore throat  -     POCT rapid strep A    Fever, unspecified fever cause  -     POCT Influenza A/B      Patient Instructions   Use an antihistamine such as Claritin, Zyrtec or Allegra to dry you out.     Use pseudoephedrine (behind the counter) to decongest. Pseudoephedrine  30 mg up to 240 mg /day. It can raise your blood pressure and give you palpitations.    Use mucinex (guaifenisin) to break up mucous up to 2400mg/day to loosen any mucous. The mucinex DM pill has a cough suppressant that can be used at night to stop the tickle at the back of your throat.    Use Nasal Saline to mechanically move any post nasal drip from your eustachian tube or from the back of your throat.    Use Afrin in each nare for no longer than 3 days, as it is addictive. It can also dry out your mucous membranes and cause elevated blood pressure.    Use Flonase 1-2 sprays/nostril per day. It is a local acting steroid nasal spray, if you develop a bloody nose, stop using the medication immediately.    Use warm salt water gargles to ease your throat pain. Warm salt water gargles as needed for sore throat-  1/2 tsp salt to 1 cup warm water, gargle as desired.    Sometimes Nyquil at night is beneficial to help you get some rest, however it is sedating and it does have an antihistamine, and tylenol.      Viral Upper Respiratory Illness (Adult)  You have a viral upper respiratory illness (URI), which is another term for the common cold. This illness is  contagious during the first few days. It is spread through the air by coughing and sneezing. It may also be spread by direct contact (touching the sick person and then touching your own eyes, nose, or mouth). Frequent handwashing will decrease risk of spread. Most viral illnesses go away within 7 to 10 days with rest and simple home remedies. Sometimes the illness may last for several weeks. Antibiotics will not kill a virus, and they are generally not prescribed for this condition.    Home care  · If symptoms are severe, rest at home for the first 2 to 3 days. When you resume activity, don't let yourself get too tired.  · Avoid being exposed to cigarette smoke (yours or others).  · You may use acetaminophen or ibuprofen to control pain and fever, unless another medicine was prescribed. (Note: If you have chronic liver or kidney disease, have ever had a stomach ulcer or gastrointestinal bleeding, or are taking blood-thinning medicines, talk with your healthcare provider before using these medicines.) Aspirin should never be given to anyone under 18 years of age who is ill with a viral infection or fever. It may cause severe liver or brain damage.  · Your appetite may be poor, so a light diet is fine. Avoid dehydration by drinking 6 to 8 glasses of fluids per day (water, soft drinks, juices, tea, or soup). Extra fluids will help loosen secretions in the nose and lungs.  · Over-the-counter cold medicines will not shorten the length of time youre sick, but they may be helpful for the following symptoms: cough, sore throat, and nasal and sinus congestion. (Note: Do not use decongestants if you have high blood pressure.)  Follow-up care  Follow up with your healthcare provider, or as advised.  When to seek medical advice  Call your healthcare provider right away if any of these occur:  · Cough with lots of colored sputum (mucus)  · Severe headache; face, neck, or ear pain  · Difficulty swallowing due to throat  pain  · Fever of 100.4°F (38°C)  Call 911, or get immediate medical care  Call emergency services right away if any of these occur:  · Chest pain, shortness of breath, wheezing, or difficulty breathing  · Coughing up blood  · Inability to swallow due to throat pain  Date Last Reviewed: 9/13/2015  © 5985-1653 [employer]. [address] [place] [address]. All rights reserved. This information is not intended as a substitute for professional medical care. Always follow your healthcare professional's instructions.

## 2019-03-01 ENCOUNTER — OFFICE VISIT (OUTPATIENT)
Dept: URGENT CARE | Facility: CLINIC | Age: 24
End: 2019-03-01
Payer: COMMERCIAL

## 2019-03-01 VITALS
DIASTOLIC BLOOD PRESSURE: 81 MMHG | HEART RATE: 73 BPM | OXYGEN SATURATION: 97 % | HEIGHT: 72 IN | SYSTOLIC BLOOD PRESSURE: 117 MMHG | WEIGHT: 165 LBS | TEMPERATURE: 98 F | RESPIRATION RATE: 18 BRPM | BODY MASS INDEX: 22.35 KG/M2

## 2019-03-01 DIAGNOSIS — J45.20 INTERMITTENT ASTHMA WITHOUT COMPLICATION, UNSPECIFIED ASTHMA SEVERITY: Primary | ICD-10-CM

## 2019-03-01 PROCEDURE — 99214 OFFICE O/P EST MOD 30 MIN: CPT | Mod: S$GLB,,, | Performed by: PHYSICIAN ASSISTANT

## 2019-03-01 PROCEDURE — 3008F PR BODY MASS INDEX (BMI) DOCUMENTED: ICD-10-PCS | Mod: CPTII,S$GLB,, | Performed by: PHYSICIAN ASSISTANT

## 2019-03-01 PROCEDURE — 99214 PR OFFICE/OUTPT VISIT, EST, LEVL IV, 30-39 MIN: ICD-10-PCS | Mod: S$GLB,,, | Performed by: PHYSICIAN ASSISTANT

## 2019-03-01 PROCEDURE — 3008F BODY MASS INDEX DOCD: CPT | Mod: CPTII,S$GLB,, | Performed by: PHYSICIAN ASSISTANT

## 2019-03-01 RX ORDER — ALBUTEROL SULFATE 90 UG/1
2 AEROSOL, METERED RESPIRATORY (INHALATION) EVERY 6 HOURS PRN
Qty: 18 G | Refills: 0 | Status: SHIPPED | OUTPATIENT
Start: 2019-03-01 | End: 2020-02-29

## 2019-03-01 NOTE — PROGRESS NOTES
Subjective:       Patient ID: Baron CALEB Shannon is a 23 y.o. male.    Vitals:  height is 6' (1.829 m) and weight is 74.8 kg (165 lb). His oral temperature is 97.9 °F (36.6 °C). His blood pressure is 117/81 and his pulse is 73. His respiration is 18 and oxygen saturation is 97%.     Chief Complaint: Asthma (Needs refill for medication.)    Asymptomatic at this time.  Pt needs refill for inhaler.  Insurance no longer covers albuterol.      Asthma   There is no cough, hemoptysis, shortness of breath, sputum production or wheezing. Pertinent negatives include no ear pain, fever, myalgias or sore throat. His past medical history is significant for asthma.       Constitution: Negative for chills, sweating, fatigue and fever.   HENT: Negative for ear pain, congestion, sinus pain, sinus pressure, sore throat and voice change.    Neck: Negative for painful lymph nodes.   Eyes: Negative for eye redness.   Respiratory: Negative for chest tightness, cough, sputum production, bloody sputum, COPD, shortness of breath, stridor, wheezing and asthma.    Gastrointestinal: Negative for nausea and vomiting.   Musculoskeletal: Negative for muscle ache.   Skin: Negative for rash and erythema.   Allergic/Immunologic: Negative for seasonal allergies and asthma.   Hematologic/Lymphatic: Negative for swollen lymph nodes.       Objective:      Physical Exam   Constitutional: He is oriented to person, place, and time. Vital signs are normal. He appears well-developed and well-nourished. He does not appear ill. No distress.   HENT:   Head: Normocephalic and atraumatic.   Right Ear: External ear normal.   Left Ear: External ear normal.   Nose: Nose normal.   Eyes: Conjunctivae, EOM and lids are normal. Right eye exhibits no discharge. Left eye exhibits no discharge.   Neck: Normal range of motion. Neck supple.   Cardiovascular: Normal rate, regular rhythm and normal heart sounds. Exam reveals no gallop and no friction rub.   No murmur  heard.  Pulmonary/Chest: Effort normal and breath sounds normal. No stridor. No respiratory distress. He has no decreased breath sounds. He has no wheezes. He has no rhonchi. He has no rales.   Musculoskeletal: Normal range of motion.   Neurological: He is alert and oriented to person, place, and time.   Skin: Skin is warm and dry. No rash noted. He is not diaphoretic. No erythema. No pallor.   Psychiatric: He has a normal mood and affect. His behavior is normal.   Nursing note and vitals reviewed.      Assessment:       1. Intermittent asthma without complication, unspecified asthma severity        Plan:         Intermittent asthma without complication, unspecified asthma severity  -     albuterol (VENTOLIN HFA) 90 mcg/actuation inhaler; Inhale 2 puffs into the lungs every 6 (six) hours as needed for Wheezing. Rescue  Dispense: 18 g; Refill: 0

## 2019-03-28 DIAGNOSIS — J45.20 INTERMITTENT ASTHMA WITHOUT COMPLICATION, UNSPECIFIED ASTHMA SEVERITY: ICD-10-CM

## 2019-03-30 RX ORDER — ALBUTEROL SULFATE 90 UG/1
AEROSOL, METERED RESPIRATORY (INHALATION)
Qty: 8.5 INHALER | Refills: 0 | OUTPATIENT
Start: 2019-03-30

## 2019-06-26 RX ORDER — ALBUTEROL SULFATE 90 UG/1
AEROSOL, METERED RESPIRATORY (INHALATION)
Qty: 8.5 INHALER | Refills: 3 | Status: SHIPPED | OUTPATIENT
Start: 2019-06-26

## 2019-09-01 ENCOUNTER — OFFICE VISIT (OUTPATIENT)
Dept: URGENT CARE | Facility: CLINIC | Age: 24
End: 2019-09-01
Payer: COMMERCIAL

## 2019-09-01 VITALS
HEIGHT: 72 IN | BODY MASS INDEX: 22.35 KG/M2 | RESPIRATION RATE: 18 BRPM | WEIGHT: 165 LBS | SYSTOLIC BLOOD PRESSURE: 112 MMHG | DIASTOLIC BLOOD PRESSURE: 73 MMHG | OXYGEN SATURATION: 97 % | HEART RATE: 75 BPM | TEMPERATURE: 97 F

## 2019-09-01 DIAGNOSIS — J32.9 BACTERIAL SINUSITIS: Primary | ICD-10-CM

## 2019-09-01 DIAGNOSIS — B96.89 BACTERIAL SINUSITIS: Primary | ICD-10-CM

## 2019-09-01 DIAGNOSIS — R05.9 COUGH: ICD-10-CM

## 2019-09-01 PROCEDURE — 3008F PR BODY MASS INDEX (BMI) DOCUMENTED: ICD-10-PCS | Mod: CPTII,S$GLB,, | Performed by: PHYSICIAN ASSISTANT

## 2019-09-01 PROCEDURE — 99214 PR OFFICE/OUTPT VISIT, EST, LEVL IV, 30-39 MIN: ICD-10-PCS | Mod: 25,S$GLB,, | Performed by: PHYSICIAN ASSISTANT

## 2019-09-01 PROCEDURE — 96372 THER/PROPH/DIAG INJ SC/IM: CPT | Mod: S$GLB,,, | Performed by: PHYSICIAN ASSISTANT

## 2019-09-01 PROCEDURE — 3008F BODY MASS INDEX DOCD: CPT | Mod: CPTII,S$GLB,, | Performed by: PHYSICIAN ASSISTANT

## 2019-09-01 PROCEDURE — 99214 OFFICE O/P EST MOD 30 MIN: CPT | Mod: 25,S$GLB,, | Performed by: PHYSICIAN ASSISTANT

## 2019-09-01 PROCEDURE — 96372 PR INJECTION,THERAP/PROPH/DIAG2ST, IM OR SUBCUT: ICD-10-PCS | Mod: S$GLB,,, | Performed by: PHYSICIAN ASSISTANT

## 2019-09-01 RX ORDER — PROMETHAZINE HYDROCHLORIDE AND DEXTROMETHORPHAN HYDROBROMIDE 6.25; 15 MG/5ML; MG/5ML
5 SYRUP ORAL EVERY 6 HOURS
Qty: 120 ML | Refills: 0 | Status: SHIPPED | OUTPATIENT
Start: 2019-09-01

## 2019-09-01 RX ORDER — BETAMETHASONE SODIUM PHOSPHATE AND BETAMETHASONE ACETATE 3; 3 MG/ML; MG/ML
9 INJECTION, SUSPENSION INTRA-ARTICULAR; INTRALESIONAL; INTRAMUSCULAR; SOFT TISSUE
Status: COMPLETED | OUTPATIENT
Start: 2019-09-01 | End: 2019-09-01

## 2019-09-01 RX ORDER — PREDNISONE 10 MG/1
20 TABLET ORAL DAILY
Qty: 10 TABLET | Refills: 0 | Status: SHIPPED | OUTPATIENT
Start: 2019-09-01 | End: 2019-09-06

## 2019-09-01 RX ORDER — AMOXICILLIN AND CLAVULANATE POTASSIUM 875; 125 MG/1; MG/1
1 TABLET, FILM COATED ORAL 2 TIMES DAILY
Qty: 20 TABLET | Refills: 0 | Status: SHIPPED | OUTPATIENT
Start: 2019-09-01 | End: 2019-09-11

## 2019-09-01 RX ORDER — FLUTICASONE PROPIONATE 50 MCG
1 SPRAY, SUSPENSION (ML) NASAL DAILY PRN
Qty: 1 BOTTLE | Refills: 0 | Status: SHIPPED | OUTPATIENT
Start: 2019-09-01

## 2019-09-01 RX ADMIN — BETAMETHASONE SODIUM PHOSPHATE AND BETAMETHASONE ACETATE 9 MG: 3; 3 INJECTION, SUSPENSION INTRA-ARTICULAR; INTRALESIONAL; INTRAMUSCULAR; SOFT TISSUE at 01:09

## 2019-09-01 NOTE — PROGRESS NOTES
Subjective:       Patient ID: Baron CALEB Shannon is a 24 y.o. male.    Vitals:  height is 6' (1.829 m) and weight is 74.8 kg (165 lb). His temperature is 97 °F (36.1 °C). His blood pressure is 112/73 and his pulse is 75. His respiration is 18 and oxygen saturation is 97%.     Chief Complaint: URI    URI    This is a new problem. The current episode started 1 to 4 weeks ago. The problem has been gradually worsening. There has been no fever. Associated symptoms include congestion, coughing and sinus pain. Pertinent negatives include no abdominal pain, chest pain, diarrhea, dysuria, ear pain, headaches, joint pain, joint swelling, nausea, neck pain, plugged ear sensation, rash, rhinorrhea, sneezing, sore throat, swollen glands, vomiting or wheezing. He has tried decongestant for the symptoms. The treatment provided no relief.       Constitution: Negative for chills, sweating, fatigue and fever.   HENT: Positive for congestion, postnasal drip, sinus pain and sinus pressure. Negative for ear pain, drooling, nosebleeds, foreign body in nose, sore throat, trouble swallowing and voice change.    Neck: Negative for neck pain, neck stiffness, painful lymph nodes and neck swelling.   Cardiovascular: Negative for chest pain, leg swelling, palpitations, sob on exertion and passing out.   Eyes: Negative for eye pain, eye redness, photophobia, double vision, blurred vision and eyelid swelling.   Respiratory: Positive for cough and sputum production. Negative for chest tightness, bloody sputum, shortness of breath, stridor and wheezing.    Gastrointestinal: Negative for abdominal pain, abdominal bloating, nausea, vomiting, constipation, diarrhea and heartburn.   Genitourinary: Negative for dysuria.   Musculoskeletal: Negative for joint pain, joint swelling, muscle cramps and muscle ache.   Skin: Negative for rash and hives.   Allergic/Immunologic: Negative for seasonal allergies, hives, itching and sneezing.   Neurological: Negative  for dizziness, light-headedness, passing out, loss of balance, headaches, altered mental status, loss of consciousness and seizures.   Hematologic/Lymphatic: Negative for swollen lymph nodes.   Psychiatric/Behavioral: Negative for altered mental status and nervous/anxious. The patient is not nervous/anxious.        Objective:      Physical Exam   Constitutional: He is oriented to person, place, and time. He appears well-developed and well-nourished. He is cooperative.  Non-toxic appearance. He does not appear ill. No distress.   HENT:   Head: Normocephalic and atraumatic.   Right Ear: Hearing, tympanic membrane, external ear and ear canal normal.   Left Ear: Hearing, tympanic membrane, external ear and ear canal normal.   Nose: Mucosal edema and rhinorrhea present. No nasal deformity. No epistaxis. Right sinus exhibits maxillary sinus tenderness and frontal sinus tenderness. Left sinus exhibits maxillary sinus tenderness and frontal sinus tenderness.   Mouth/Throat: Uvula is midline and mucous membranes are normal. No trismus in the jaw. Normal dentition. No uvula swelling. Posterior oropharyngeal edema and posterior oropharyngeal erythema present. No oropharyngeal exudate.   Eyes: Conjunctivae and lids are normal. No scleral icterus.   Sclera clear bilat   Neck: Trachea normal, full passive range of motion without pain and phonation normal. Neck supple.   Cardiovascular: Normal rate, regular rhythm, normal heart sounds, intact distal pulses and normal pulses.   Pulmonary/Chest: Effort normal and breath sounds normal. No respiratory distress.   Abdominal: Soft. Normal appearance and bowel sounds are normal. He exhibits no distension. There is no tenderness.   Musculoskeletal: Normal range of motion. He exhibits no edema or deformity.   Lymphadenopathy:     He has no cervical adenopathy.   Neurological: He is alert and oriented to person, place, and time. He exhibits normal muscle tone. Coordination normal.   Skin:  Skin is warm, dry and intact. Capillary refill takes less than 2 seconds. No rash noted. He is not diaphoretic. No pallor.   Psychiatric: He has a normal mood and affect. His speech is normal and behavior is normal. Judgment and thought content normal. Cognition and memory are normal.   Nursing note and vitals reviewed.      Assessment:       1. Bacterial sinusitis    2. Cough        Plan:         Bacterial sinusitis  -     fluticasone propionate (FLONASE ALLERGY RELIEF) 50 mcg/actuation nasal spray; 1 spray (50 mcg total) by Each Nostril route daily as needed.  Dispense: 1 Bottle; Refill: 0  -     amoxicillin-clavulanate 875-125mg (AUGMENTIN) 875-125 mg per tablet; Take 1 tablet by mouth 2 (two) times daily. for 10 days  Dispense: 20 tablet; Refill: 0  -     predniSONE (DELTASONE) 10 MG tablet; Take 2 tablets (20 mg total) by mouth once daily. for 5 days  Dispense: 10 tablet; Refill: 0    Cough  -     promethazine-dextromethorphan (PROMETHAZINE-DM) 6.25-15 mg/5 mL Syrp; Take 5 mLs by mouth every 6 (six) hours.  Dispense: 120 mL; Refill: 0    Other orders  -     betamethasone acetate-betamethasone sodium phosphate injection 9 mg    Discussed PRO/CONs of Oral Steroids versus IM Steroids, but patient still requesting IM at this time.     Patient Instructions     If you were prescribed a narcotic or controlled medication, do not drive or operate heavy equipment or machinery while taking these medications.  You must understand that you've received an Urgent Care treatment only and that you may be released before all your medical problems are known or treated. You, the patient, will arrange for follow up care as instructed.  Follow up with your PCP or specialty clinic as directed if not improved or as needed. You can call [phone] to schedule an appointment with the appropriate provider.  If your condition worsens we recommend that you receive another evaluation at the Emergency Department for any concerns or  worsening of condition.  Patient aware and verbalized understanding.    You received a steroid shot today - this can elevate your blood pressure, elevate your blood sugar, water weight gain, nervous energy, redness to the face and dimpling of the skin where the shot goes in.   Patient aware, verbalized understanding and agreed with plan of care.    Increase fluids and rest is important.  Avoid contact with sick individuals.  Humidifier use at home.  Augmentin RX as prescribed for sinusitis.  Prednisone RX as prescribed to help reduce inflammation/swelling.  Cough Syrup RX as prescribed for nighttime cough - will make you drowsy.  Flonase Nasal Spray RX as prescribed for nasal congestion/seasonal allergies.  OTC Tylenol or Motrin every 4 - 6 hours as needed for fever or pain.  Follow-up with your PCP in the next 48hrs or sooner for re-evaluation especially if no improvement in symptoms.  ER precautions given to patient.  Patient aware and verbalized understanding.    Self-Care for Sinusitis     Drinking plenty of water can help sinuses drain.   Sinusitis can often be managed with self-care. Self-care can keep sinuses moist and make you feel more comfortable. Remember to follow your doctor's instructions closely. This can make a big difference in getting your sinus problem under control.  Drink fluids  Drinking extra fluids helps thin your mucus. This lets it drain from your sinuses more easily. Have a glass of water every hour or two. A humidifier helps in much the same way. Fluids can also offset the drying effects of certain medicines. If you use a humidifier, follow the product maker's instructions on how to use it. Clean it on a regular schedule.  Use saltwater rinses  Rinses help keep your sinuses and nose moist. Mix a teaspoon of salt in 8 ounces of fresh, warm water. Use a bulb syringe to gently squirt the water into your nose a few times a day. You can also buy ready-made saline nasal sprays.  Apply hot or  cold packs  Applying heat to the area surrounding your sinuses may make you feel more comfortable. Use a hot water bottle or a hand towel dipped in hot water. Some people also find ice packs effective for relieving pain.  Medicines  Your doctor may prescribe medications to help treat your sinusitis. If you have an infection, antibiotics can help clear it up. If you are prescribed antibiotics, take all pills on schedule until they are gone, even if you feel better. Decongestants help relieve swelling. Use decongestant sprays for short periods only under the direction of your doctor. If you have allergies, your doctor may prescribe medications to help relieve them.   Date Last Reviewed: 10/1/2016  © 6354-8995 [employer]. [address] [place] [address], [place] [address]. All rights reserved. This information is not intended as a substitute for professional medical care. Always follow your healthcare professional's instructions.

## 2019-09-01 NOTE — PATIENT INSTRUCTIONS
If you were prescribed a narcotic or controlled medication, do not drive or operate heavy equipment or machinery while taking these medications.  You must understand that you've received an Urgent Care treatment only and that you may be released before all your medical problems are known or treated. You, the patient, will arrange for follow up care as instructed.  Follow up with your PCP or specialty clinic as directed if not improved or as needed. You can call [phone] to schedule an appointment with the appropriate provider.  If your condition worsens we recommend that you receive another evaluation at the Emergency Department for any concerns or worsening of condition.  Patient aware and verbalized understanding.    You received a steroid shot today - this can elevate your blood pressure, elevate your blood sugar, water weight gain, nervous energy, redness to the face and dimpling of the skin where the shot goes in.   Patient aware, verbalized understanding and agreed with plan of care.    Increase fluids and rest is important.  Avoid contact with sick individuals.  Humidifier use at home.  Augmentin RX as prescribed for sinusitis.  Prednisone RX as prescribed to help reduce inflammation/swelling.  Cough Syrup RX as prescribed for nighttime cough - will make you drowsy.  Flonase Nasal Spray RX as prescribed for nasal congestion/seasonal allergies.  OTC Tylenol or Motrin every 4 - 6 hours as needed for fever or pain.  Follow-up with your PCP in the next 48hrs or sooner for re-evaluation especially if no improvement in symptoms.  ER precautions given to patient.  Patient aware and verbalized understanding.    Self-Care for Sinusitis     Drinking plenty of water can help sinuses drain.   Sinusitis can often be managed with self-care. Self-care can keep sinuses moist and make you feel more comfortable. Remember to follow your doctor's instructions closely. This can make a big difference in getting your sinus  problem under control.  Drink fluids  Drinking extra fluids helps thin your mucus. This lets it drain from your sinuses more easily. Have a glass of water every hour or two. A humidifier helps in much the same way. Fluids can also offset the drying effects of certain medicines. If you use a humidifier, follow the product maker's instructions on how to use it. Clean it on a regular schedule.  Use saltwater rinses  Rinses help keep your sinuses and nose moist. Mix a teaspoon of salt in 8 ounces of fresh, warm water. Use a bulb syringe to gently squirt the water into your nose a few times a day. You can also buy ready-made saline nasal sprays.  Apply hot or cold packs  Applying heat to the area surrounding your sinuses may make you feel more comfortable. Use a hot water bottle or a hand towel dipped in hot water. Some people also find ice packs effective for relieving pain.  Medicines  Your doctor may prescribe medications to help treat your sinusitis. If you have an infection, antibiotics can help clear it up. If you are prescribed antibiotics, take all pills on schedule until they are gone, even if you feel better. Decongestants help relieve swelling. Use decongestant sprays for short periods only under the direction of your doctor. If you have allergies, your doctor may prescribe medications to help relieve them.   Date Last Reviewed: 10/1/2016  © 0703-2763 [employer]. [address] [place] [address], [place] [address]. All rights reserved. This information is not intended as a substitute for professional medical care. Always follow your healthcare professional's instructions.

## 2019-09-01 NOTE — LETTER
September 1, 2019      [facility] [place] [address]  Phone: [phone]  Fax: [phone]       Patient: Baron Millie Shannon   YOB: 1995  Date of Visit: 09/01/2019    To Whom It May Concern:    Lavonne Shannon  was at [facility] on 09/01/2019. He may return to work/school on 09/03/2019 with no restrictions. If you have any questions or concerns, or if I can be of further assistance, please do not hesitate to contact me.    Sincerely,    Jesenia Cabrera PA-C

## 2019-09-22 DIAGNOSIS — J32.9 BACTERIAL SINUSITIS: ICD-10-CM

## 2019-09-22 DIAGNOSIS — B96.89 BACTERIAL SINUSITIS: ICD-10-CM

## 2019-09-22 RX ORDER — FLUTICASONE PROPIONATE 50 MCG
SPRAY, SUSPENSION (ML) NASAL
Qty: 16 ML | Refills: 0 | OUTPATIENT
Start: 2019-09-22

## 2019-10-17 ENCOUNTER — OFFICE VISIT (OUTPATIENT)
Dept: URGENT CARE | Facility: CLINIC | Age: 24
End: 2019-10-17
Payer: COMMERCIAL

## 2019-10-17 VITALS
HEIGHT: 72 IN | TEMPERATURE: 98 F | BODY MASS INDEX: 22.35 KG/M2 | SYSTOLIC BLOOD PRESSURE: 115 MMHG | WEIGHT: 165 LBS | DIASTOLIC BLOOD PRESSURE: 68 MMHG | OXYGEN SATURATION: 97 % | RESPIRATION RATE: 18 BRPM | HEART RATE: 89 BPM

## 2019-10-17 DIAGNOSIS — J45.901 EXACERBATION OF ASTHMA, UNSPECIFIED ASTHMA SEVERITY, UNSPECIFIED WHETHER PERSISTENT: ICD-10-CM

## 2019-10-17 DIAGNOSIS — G43.809 OTHER MIGRAINE WITHOUT STATUS MIGRAINOSUS, NOT INTRACTABLE: Primary | ICD-10-CM

## 2019-10-17 DIAGNOSIS — J45.30 MILD PERSISTENT ASTHMA WITHOUT COMPLICATION: Chronic | ICD-10-CM

## 2019-10-17 PROCEDURE — 3008F BODY MASS INDEX DOCD: CPT | Mod: CPTII,S$GLB,, | Performed by: PHYSICIAN ASSISTANT

## 2019-10-17 PROCEDURE — 99214 OFFICE O/P EST MOD 30 MIN: CPT | Mod: 25,S$GLB,, | Performed by: PHYSICIAN ASSISTANT

## 2019-10-17 PROCEDURE — 96372 THER/PROPH/DIAG INJ SC/IM: CPT | Mod: S$GLB,,, | Performed by: PHYSICIAN ASSISTANT

## 2019-10-17 PROCEDURE — 96372 PR INJECTION,THERAP/PROPH/DIAG2ST, IM OR SUBCUT: ICD-10-PCS | Mod: S$GLB,,, | Performed by: PHYSICIAN ASSISTANT

## 2019-10-17 PROCEDURE — 3008F PR BODY MASS INDEX (BMI) DOCUMENTED: ICD-10-PCS | Mod: CPTII,S$GLB,, | Performed by: PHYSICIAN ASSISTANT

## 2019-10-17 PROCEDURE — 99214 PR OFFICE/OUTPT VISIT, EST, LEVL IV, 30-39 MIN: ICD-10-PCS | Mod: 25,S$GLB,, | Performed by: PHYSICIAN ASSISTANT

## 2019-10-17 RX ORDER — ALBUTEROL SULFATE 0.83 MG/ML
SOLUTION RESPIRATORY (INHALATION)
Qty: 50 ML | Refills: 0 | Status: SHIPPED | OUTPATIENT
Start: 2019-10-17

## 2019-10-17 RX ORDER — NAPROXEN 500 MG/1
500 TABLET ORAL 2 TIMES DAILY PRN
Qty: 14 TABLET | Refills: 0 | Status: SHIPPED | OUTPATIENT
Start: 2019-10-17 | End: 2019-10-24

## 2019-10-17 RX ORDER — PREDNISONE 20 MG/1
TABLET ORAL
Qty: 9 TABLET | Refills: 0 | Status: SHIPPED | OUTPATIENT
Start: 2019-10-17 | End: 2020-10-02

## 2019-10-17 RX ORDER — KETOROLAC TROMETHAMINE 30 MG/ML
60 INJECTION, SOLUTION INTRAMUSCULAR; INTRAVENOUS
Status: COMPLETED | OUTPATIENT
Start: 2019-10-17 | End: 2019-10-17

## 2019-10-17 RX ADMIN — KETOROLAC TROMETHAMINE 60 MG: 30 INJECTION, SOLUTION INTRAMUSCULAR; INTRAVENOUS at 08:10

## 2019-10-17 NOTE — LETTER
October 17, 2019      [facility] [place] [address]  Phone: [phone]  Fax: [phone]       Patient: Baron Millie Shannon   YOB: 1995  Date of Visit: 10/17/2019    To Whom It May Concern:    Lavonne Shannon  was at [facility] on 10/17/2019. He may return to work/school on 10/18/19 with no restrictions. If you have any questions or concerns, or if I can be of further assistance, please do not hesitate to contact me.    Sincerely,    Getachew Broderick MA

## 2019-10-18 NOTE — PATIENT INSTRUCTIONS
PLEASE READ YOUR DISCHARGE INSTRUCTIONS ENTIRELY AS IT CONTAINS IMPORTANT INFORMATION.  You received an anti-inflammatory shot today.  Please do not take any ibuprofen or other anti inflammatories until tomorrow.   - Rest.    - Drink plenty of fluids.    - Tylenol or Ibuprofen as directed as needed for fever/pain.    - Follow up with your PCP or specialty clinic as directed in the next 1-2 weeks if not improved or as needed.  You can call [phone] to schedule an appointment with the appropriate provider.    - If you were prescribed antibiotics, please take them to completion.  - If you were prescribed a narcotic medication, do not drive or operate heavy equipment or machinery while taking these medications.  - If you  smoke, please stop smoking.  -You must understand that you've received an Urgent Care treatment only and that you may be released before all your medical problems are known or treated. You, the patient, will    arrange for follow up care as instructed.  - Please return to Urgent Care or to the Emergency Department if your symptoms worsen.    Patient aware and verbalized understanding.    Asthma (Adult)  Asthma is a disease where the medium and  small air passages within the lung go into spasm and restrict the flow of air. Inflammation and swelling of the airways cause further restriction. During an acute asthma attack, these factors cause difficulty breathing, wheezing, cough and chest tightness.    An asthma attack can be triggered by many things. Common triggers include infections such as the common cold, bronchitis, pneumonia. Irritants such as smoke or pollutants in the air, emotional upset, and exercise can also trigger an attack. In many adults with asthma, allergies to dust, mold, pollen and animal dander can cause an asthma attack. Skipping doses of daily asthma medicine can also bring on an asthma attack.  Asthma can be controlled using the proper medicines prescribed by your healthcare  "provider and avoiding exposure to known triggers including allergens and irritants.  Home care  · Take prescribed medicine exactly at the times advised. If you need medicine such as from a hand held inhaler or aerosol breathing machine more than every 4 hours, contact your healthcare provider or seek immediate medical attention. If prescribed an antibiotic or prednisone, take all of the medicine as prescribed, even if you are feeling better after a few days.  · Do not smoke. Avoid being exposed to the smoke of others.  · Some people with asthma have worsening of their symptoms when they take aspirin and non-steroidal or fever-reducing medicines like ibuprofen and naproxen. Talk to your healthcare provider if you think this may apply to you.  Follow-up care  Follow up with your healthcare provider, or as advised. Always bring all of your current medicines to any appointments with your healthcare provider. Also bring a complete list of medications even those not taken for asthma. If you do not already have one, talk to your healthcare provider about developing a personalized "Asthma Action Plan."  A pneumococcal (pneumonia) vaccine and yearly flu shot (every fall) are recommended. Ask your doctor about this.  When to seek medical advice  Call your healthcare provider right away if any of these occur:   · Increased wheezing or shortness of breath  · Need to use your inhalers more often than usual without relief  · Fever of 100.4ºF (38ºC) or higher, or as directed by your healthcare provider  · Coughing up lots of dark-colored or bloody sputum (mucus)  · Chest pain with each breath  · If you use a peak flow meter as part of an Asthma Action Plan, and you are still in the yellow zone (50% to 80%) 15 minutes after using inhaler medicine.  Call 911  Call 911 if any of the following occur  · Trouble walking or talking because of shortness of breath  · If you use a peak flow meter as part of an Asthma Action Plan and you are " still in the red zone (less than 50%) 15 minutes after using inhaler medicine  · Lips or fingernails turning gray or blue  Date Last Reviewed: 12/2/2015 © 2000-2017 [employer]. [address] [place] [address], [place] [address]. All rights reserved. This information is not intended as a substitute for professional medical care. Always follow your healthcare professional's instructions.        Migraine Headache  This often severe type of headache is different from other types of headaches in that symptoms other than pain occur with the headache. Nausea and vomiting, lightheadedness, sensitivity to light (photophobia), and other visual disturbances are common migraine symptoms. The pain may last from a few hours to several days. It is not clear why migraines occur but certain factors called triggers can raise the risk of having a migraine attack. A migraine may be triggered by emotional stress or depression, or by hormone changes during the menstrual cycle. Other triggers include birth control pills, overuse of migraine medicines, alcohol or caffeine, foods with tyramine (such as aged cheese and wine), eyestrain, weather changes, missed meals, or too little or too much sleep.  Home care  Follow these tips when taking care of yourself at home:  · Dont drive yourself home if you were given pain medicine for your headache or are having visual symptoms. Instead, have someone else drive you home. Try to sleep when you get home. You should feel much better when you wake up.  · Cold can help ease migraine symptoms. Put an ice pack on your forehead or at the base of your skull. Put heat on the back of your neck to help ease any neck spasm.  · Drink only clear liquids or eat a light diet until your symptoms get better. This will help you avoid nausea and vomiting.  How to prevent migraines  Pay attention to what seems to trigger your headache. Try to avoid the triggers when you can. If you have frequent headaches,  consider keeping a headache diary. In it, write down what you were doing, feeling, or eating in the hours before each headache. Show this to your healthcare provider to help find the cause of your headaches.  If stress seems to be a trigger for your headaches, figure out what is causing stress in your life. Learn new ways to handle your stress. Ideas include regular exercise, biofeedback, self-hypnosis, yoga, and meditation. Talk with your healthcare provider to find out more information about managing stress. Many books and digital media are also available on this subject.  Tyramine is a substance found in many foods. It can trigger a migraine in some people. These foods contain tyramine:  · Chocolate  · Yogurt  · All cheeses, but especially aged cheeses  · Smoked or pickled fish and meat, including herring, caviar, bologna, pepperoni, and salami  · Liver  · Avocados  · Bananas  · Figs  · Raisins  · Red wine  Try staying away from these foods for 1 to 2 months to see if you have fewer headaches.  How to treat future headaches  · Take time out at the first sign of a headache, if possible. Find a quiet, dark, comfortable place to sit or lie down. Let yourself relax or sleep.  · Put an ice pack on your forehead or on the area of greatest pain. A heating pad and massage may help if you are having a muscle spasm and tightness in your neck.  · If you have been prescribed a medicine to stop a migraine headache, use this at the first warning sign of the headache for best results. First signs may be an aura or pain.  · If you need to take medicine often for your migraine, talk with your healthcare provider about other ways to prevent your headaches.  Follow-up care  Follow up with your healthcare provider, or as advised. Talk with your provider if you have frequent headaches. He or she can figure out a treatment plan. Ask if you can have medicine to take at home the next time you get a bad headache. This may keep you from  having to visit the emergency department in the future. You may need to see a headache specialist (neurologist) if you continue to have headaches.  When to seek medical advice  Call your healthcare provider right away if any of these occur:  · Your head pain gets worse, or doesnt get better within 24 hours  · You cant keep liquids down (repeated vomiting)  · Pain in your sinuses, ears, or throat  · Fever of 100.4º F (38º C) or higher, or as directed by your healthcare provider  · Stiff neck  · Extreme drowsiness, confusion, or fainting  · Dizziness, or dizziness with spinning sensation (vertigo)  · Weakness in an arm or leg, or on one side of your face  · Difficulty talking or seeing  Date Last Reviewed: 8/1/2016 © 2000-2017 [employer]. [address] [place] [address], [place] [address]. All rights reserved. This information is not intended as a substitute for professional medical care. Always follow your healthcare professional's instructions.

## 2019-10-18 NOTE — PROGRESS NOTES
Subjective:       Patient ID: Baron CALEB Shannon is a 24 y.o. male.    Vitals:  height is 6' (1.829 m) and weight is 74.8 kg (165 lb). His temperature is 97.6 °F (36.4 °C). His blood pressure is 115/68 and his pulse is 89. His respiration is 18 and oxygen saturation is 97%.     Chief Complaint: Migraine    Mr. Shannon presents for evaluation of migraine headache & wheezing.  He has a history of headache asthma & has been using his albuterol inhaler, but is still having wheezing.  He denies any fevers, chills, N/V/D, SOB, sputum production.  He states he typically has an asthma flare up around this time of year.  He has a nebulizer at home, but he hasn't been using it.  His headache started last night & is behind his right eye.  He denies any photophobia, N/V, paresthesias, weakness.  He has taken excedrin migraine last night & today without relief.      Migraine    This is a new problem. The current episode started yesterday. The problem occurs constantly. The problem has been gradually worsening. The pain is located in the occipital region. The pain radiates to the right neck. The pain quality is not similar to prior headaches. The quality of the pain is described as pulsating. The pain is at a severity of 6/10. The pain is moderate. Associated symptoms include coughing. Pertinent negatives include no blurred vision, dizziness, eye pain, fever, loss of balance, nausea, neck pain, photophobia, tinnitus or vomiting. The symptoms are aggravated by bright light. He has tried Excedrin (excedrin) for the symptoms. The treatment provided mild relief. There is no history of migraine headaches.       Constitution: Negative for chills, sweating and fever.   HENT: Negative for tinnitus, facial swelling, congestion and sinus pain.    Neck: Negative for neck pain and neck stiffness.   Eyes: Negative for eye pain, photophobia, vision loss, double vision and blurred vision.   Respiratory: Positive for cough and asthma. Negative  for sputum production.    Gastrointestinal: Negative for nausea and vomiting.   Musculoskeletal: Negative for trauma and muscle ache.   Skin: Negative for rash, wound and lesion.   Allergic/Immunologic: Positive for asthma.   Neurological: Positive for headaches. Negative for dizziness, history of vertigo, light-headedness, facial drooping, speech difficulty, coordination disturbances, loss of balance, history of migraines, disorientation and loss of consciousness.   Psychiatric/Behavioral: Negative for disorientation, confusion, nervous/anxious, sleep disturbance and depression. The patient is not nervous/anxious.        Objective:      Physical Exam   Constitutional: He is oriented to person, place, and time. He appears well-developed and well-nourished.  Non-toxic appearance. He does not appear ill. No distress.   HENT:   Head: Normocephalic and atraumatic.   Right Ear: Hearing, tympanic membrane, external ear and ear canal normal.   Left Ear: Hearing, tympanic membrane, external ear and ear canal normal.   Nose: Nose normal. No mucosal edema, rhinorrhea or nasal deformity. No epistaxis. Right sinus exhibits no maxillary sinus tenderness and no frontal sinus tenderness. Left sinus exhibits no maxillary sinus tenderness and no frontal sinus tenderness.   Mouth/Throat: Uvula is midline, oropharynx is clear and moist and mucous membranes are normal. No trismus in the jaw. Normal dentition. No uvula swelling. No posterior oropharyngeal erythema.   Eyes: Pupils are equal, round, and reactive to light. Conjunctivae, EOM and lids are normal. No scleral icterus.   Neck: Trachea normal, normal range of motion, full passive range of motion without pain and phonation normal. Neck supple. No neck rigidity.   Cardiovascular: Normal rate, regular rhythm, normal heart sounds, intact distal pulses and normal pulses.   Pulmonary/Chest: Effort normal. No stridor. No tachypnea. No respiratory distress. He has no decreased breath  sounds. He has wheezes in the right upper field and the left upper field. He has no rhonchi. He has no rales.   Mild exp wheeze B upper lung fields   Abdominal: Soft. Normal appearance and bowel sounds are normal. He exhibits no distension. There is no tenderness.   Musculoskeletal: Normal range of motion. He exhibits no edema or deformity.   Neurological: He is alert and oriented to person, place, and time. He displays no atrophy and no tremor. No cranial nerve deficit or sensory deficit. He exhibits normal muscle tone. Coordination and gait normal. GCS eye subscore is 4. GCS verbal subscore is 5. GCS motor subscore is 6.   Skin: Skin is warm, dry, intact, not diaphoretic and not pale.   Psychiatric: He has a normal mood and affect. His speech is normal and behavior is normal. Judgment and thought content normal. Cognition and memory are normal.   Nursing note and vitals reviewed.        Assessment:       1. Other migraine without status migrainosus, not intractable    2. Exacerbation of asthma, unspecified asthma severity, unspecified whether persistent    3. Mild persistent asthma with frequent use of beta agonist        Plan:         Other migraine without status migrainosus, not intractable    Exacerbation of asthma, unspecified asthma severity, unspecified whether persistent    Mild persistent asthma with frequent use of beta agonist  -     albuterol (PROVENTIL) 2.5 mg /3 mL (0.083 %) nebulizer solution; Use one vial as needed every 4 hours as needed  Dispense: 50 mL; Refill: 0    Other orders  -     ketorolac injection 60 mg  -     predniSONE (DELTASONE) 20 MG tablet; Take 2 tabs by mouth once daily x 3 days, then 1 tab by mouth once daily x 3 days.  Dispense: 9 tablet; Refill: 0  -     naproxen (NAPROSYN) 500 MG tablet; Take 1 tablet (500 mg total) by mouth 2 (two) times daily as needed.  Dispense: 14 tablet; Refill: 0    Discussed risks of steroids with patient, he verbalized understanding.     Patient  Instructions   PLEASE READ YOUR DISCHARGE INSTRUCTIONS ENTIRELY AS IT CONTAINS IMPORTANT INFORMATION.  You received an anti-inflammatory shot today.  Please do not take any ibuprofen or other anti inflammatories until tomorrow.   - Rest.    - Drink plenty of fluids.    - Tylenol or Ibuprofen as directed as needed for fever/pain.    - Follow up with your PCP or specialty clinic as directed in the next 1-2 weeks if not improved or as needed.  You can call [phone] to schedule an appointment with the appropriate provider.    - If you were prescribed antibiotics, please take them to completion.  - If you were prescribed a narcotic medication, do not drive or operate heavy equipment or machinery while taking these medications.  - If you  smoke, please stop smoking.  -You must understand that you've received an Urgent Care treatment only and that you may be released before all your medical problems are known or treated. You, the patient, will    arrange for follow up care as instructed.  - Please return to Urgent Care or to the Emergency Department if your symptoms worsen.    Patient aware and verbalized understanding.    Asthma (Adult)  Asthma is a disease where the medium and  small air passages within the lung go into spasm and restrict the flow of air. Inflammation and swelling of the airways cause further restriction. During an acute asthma attack, these factors cause difficulty breathing, wheezing, cough and chest tightness.    An asthma attack can be triggered by many things. Common triggers include infections such as the common cold, bronchitis, pneumonia. Irritants such as smoke or pollutants in the air, emotional upset, and exercise can also trigger an attack. In many adults with asthma, allergies to dust, mold, pollen and animal dander can cause an asthma attack. Skipping doses of daily asthma medicine can also bring on an asthma attack.  Asthma can be controlled using the proper medicines prescribed by  "your healthcare provider and avoiding exposure to known triggers including allergens and irritants.  Home care  · Take prescribed medicine exactly at the times advised. If you need medicine such as from a hand held inhaler or aerosol breathing machine more than every 4 hours, contact your healthcare provider or seek immediate medical attention. If prescribed an antibiotic or prednisone, take all of the medicine as prescribed, even if you are feeling better after a few days.  · Do not smoke. Avoid being exposed to the smoke of others.  · Some people with asthma have worsening of their symptoms when they take aspirin and non-steroidal or fever-reducing medicines like ibuprofen and naproxen. Talk to your healthcare provider if you think this may apply to you.  Follow-up care  Follow up with your healthcare provider, or as advised. Always bring all of your current medicines to any appointments with your healthcare provider. Also bring a complete list of medications even those not taken for asthma. If you do not already have one, talk to your healthcare provider about developing a personalized "Asthma Action Plan."  A pneumococcal (pneumonia) vaccine and yearly flu shot (every fall) are recommended. Ask your doctor about this.  When to seek medical advice  Call your healthcare provider right away if any of these occur:   · Increased wheezing or shortness of breath  · Need to use your inhalers more often than usual without relief  · Fever of 100.4ºF (38ºC) or higher, or as directed by your healthcare provider  · Coughing up lots of dark-colored or bloody sputum (mucus)  · Chest pain with each breath  · If you use a peak flow meter as part of an Asthma Action Plan, and you are still in the yellow zone (50% to 80%) 15 minutes after using inhaler medicine.  Call 911  Call 911 if any of the following occur  · Trouble walking or talking because of shortness of breath  · If you use a peak flow meter as part of an Asthma Action " Plan and you are still in the red zone (less than 50%) 15 minutes after using inhaler medicine  · Lips or fingernails turning gray or blue  Date Last Reviewed: 12/2/2015 © 2000-2017 [employer]. [address] [place] [address], [place] [address]. All rights reserved. This information is not intended as a substitute for professional medical care. Always follow your healthcare professional's instructions.        Migraine Headache  This often severe type of headache is different from other types of headaches in that symptoms other than pain occur with the headache. Nausea and vomiting, lightheadedness, sensitivity to light (photophobia), and other visual disturbances are common migraine symptoms. The pain may last from a few hours to several days. It is not clear why migraines occur but certain factors called triggers can raise the risk of having a migraine attack. A migraine may be triggered by emotional stress or depression, or by hormone changes during the menstrual cycle. Other triggers include birth control pills, overuse of migraine medicines, alcohol or caffeine, foods with tyramine (such as aged cheese and wine), eyestrain, weather changes, missed meals, or too little or too much sleep.  Home care  Follow these tips when taking care of yourself at home:  · Dont drive yourself home if you were given pain medicine for your headache or are having visual symptoms. Instead, have someone else drive you home. Try to sleep when you get home. You should feel much better when you wake up.  · Cold can help ease migraine symptoms. Put an ice pack on your forehead or at the base of your skull. Put heat on the back of your neck to help ease any neck spasm.  · Drink only clear liquids or eat a light diet until your symptoms get better. This will help you avoid nausea and vomiting.  How to prevent migraines  Pay attention to what seems to trigger your headache. Try to avoid the triggers when you can. If you have  frequent headaches, consider keeping a headache diary. In it, write down what you were doing, feeling, or eating in the hours before each headache. Show this to your healthcare provider to help find the cause of your headaches.  If stress seems to be a trigger for your headaches, figure out what is causing stress in your life. Learn new ways to handle your stress. Ideas include regular exercise, biofeedback, self-hypnosis, yoga, and meditation. Talk with your healthcare provider to find out more information about managing stress. Many books and digital media are also available on this subject.  Tyramine is a substance found in many foods. It can trigger a migraine in some people. These foods contain tyramine:  · Chocolate  · Yogurt  · All cheeses, but especially aged cheeses  · Smoked or pickled fish and meat, including herring, caviar, bologna, pepperoni, and salami  · Liver  · Avocados  · Bananas  · Figs  · Raisins  · Red wine  Try staying away from these foods for 1 to 2 months to see if you have fewer headaches.  How to treat future headaches  · Take time out at the first sign of a headache, if possible. Find a quiet, dark, comfortable place to sit or lie down. Let yourself relax or sleep.  · Put an ice pack on your forehead or on the area of greatest pain. A heating pad and massage may help if you are having a muscle spasm and tightness in your neck.  · If you have been prescribed a medicine to stop a migraine headache, use this at the first warning sign of the headache for best results. First signs may be an aura or pain.  · If you need to take medicine often for your migraine, talk with your healthcare provider about other ways to prevent your headaches.  Follow-up care  Follow up with your healthcare provider, or as advised. Talk with your provider if you have frequent headaches. He or she can figure out a treatment plan. Ask if you can have medicine to take at home the next time you get a bad headache. This  may keep you from having to visit the emergency department in the future. You may need to see a headache specialist (neurologist) if you continue to have headaches.  When to seek medical advice  Call your healthcare provider right away if any of these occur:  · Your head pain gets worse, or doesnt get better within 24 hours  · You cant keep liquids down (repeated vomiting)  · Pain in your sinuses, ears, or throat  · Fever of 100.4º F (38º C) or higher, or as directed by your healthcare provider  · Stiff neck  · Extreme drowsiness, confusion, or fainting  · Dizziness, or dizziness with spinning sensation (vertigo)  · Weakness in an arm or leg, or on one side of your face  · Difficulty talking or seeing  Date Last Reviewed: 8/1/2016  © 6431-7618 [employer]. [address] [place] [address], [place] [address]. All rights reserved. This information is not intended as a substitute for professional medical care. Always follow your healthcare professional's instructions.

## 2020-07-15 ENCOUNTER — OFFICE VISIT (OUTPATIENT)
Dept: URGENT CARE | Facility: CLINIC | Age: 25
End: 2020-07-15
Payer: COMMERCIAL

## 2020-07-15 VITALS
HEIGHT: 72 IN | WEIGHT: 165 LBS | BODY MASS INDEX: 22.35 KG/M2 | OXYGEN SATURATION: 97 % | HEART RATE: 74 BPM | SYSTOLIC BLOOD PRESSURE: 124 MMHG | TEMPERATURE: 98 F | DIASTOLIC BLOOD PRESSURE: 78 MMHG | RESPIRATION RATE: 16 BRPM

## 2020-07-15 DIAGNOSIS — R43.2 LOSS OF TASTE: Primary | ICD-10-CM

## 2020-07-15 PROCEDURE — U0003 INFECTIOUS AGENT DETECTION BY NUCLEIC ACID (DNA OR RNA); SEVERE ACUTE RESPIRATORY SYNDROME CORONAVIRUS 2 (SARS-COV-2) (CORONAVIRUS DISEASE [COVID-19]), AMPLIFIED PROBE TECHNIQUE, MAKING USE OF HIGH THROUGHPUT TECHNOLOGIES AS DESCRIBED BY CMS-2020-01-R: HCPCS

## 2020-07-15 PROCEDURE — 99214 OFFICE O/P EST MOD 30 MIN: CPT | Mod: S$GLB,,, | Performed by: PHYSICIAN ASSISTANT

## 2020-07-15 PROCEDURE — 99214 PR OFFICE/OUTPT VISIT, EST, LEVL IV, 30-39 MIN: ICD-10-PCS | Mod: S$GLB,,, | Performed by: PHYSICIAN ASSISTANT

## 2020-07-16 NOTE — PATIENT INSTRUCTIONS
Instructions for Patients with Confirmed or Suspected COVID-19    If you are awaiting your test result, you will either be called or it will be released to the patient portal.  If you have any questions about your test, please visit [email or web address] or call our COVID-19 information line at [phone].      Preventing the Spread of Coronavirus Disease 2019 (COVID-19) in Homes and Residential Communities -- Patients     Prevention steps for people with confirmed or suspected COVID-19 (including persons under investigation) who do not need to be hospitalized and people with confirmed COVID-19 who were hospitalized and determined to be medically stable to go home.      Stay home except to get medical care.    Separate yourself from other people and animals in your home.    Call ahead before visiting your doctor.    Wear a face mask.    Cover your coughs and sneezes.    Clean your hands often.    Avoid sharing personal household items.    Clean all high-touch surfaces every day.    Monitor your symptoms. Seek prompt medical attention if your illness is worsening (e.g., difficulty breathing). Before seeking care, call your healthcare provider.    If you have a medical emergency and must call 911, notify the dispatcher that you have or are being evaluated for COVID-19. If possible, put on a face mask before emergency medical services arrive.    Use the following symptom-based strategy to return to normal activity following a suspected or confirmed case of COVID-19. Continue isolation until:   o At least 3 days (72 hours) have passed since recovery defined as resolution of fever without the use of fever-reducing medications and improvement in respiratory symptoms (e.g. cough, shortness of breath), and   o At least 10 days have passed since symptoms first appeared.     Precautions for household members, intimate partners and caregivers in a non-healthcare setting of a patient with symptomatic  laboratory-confirmed COVID-19 or a patient under investigation.     Household members, intimate partners and caregivers in a non-healthcare setting may have close contact with a person with symptomatic, laboratory-confirmed COVID-19 or a person under investigation. Close contacts should monitor their health; they should call their healthcare provider right away if they develop symptoms suggestive of COVID-19 (e.g., fever, cough, shortness of breath). Close contacts should also follow these recommendations:     · Stay home for the duration of the time recommended by healthcare provider, except to get medical care. Separate yourself from other people and animals in the home.  · Monitor the patients symptoms. If the patient is getting sicker, call his or her healthcare provider. If the patient has a medical emergency and you need to call 911, notify the dispatch personnel that the patient has or is being evaluated for COVID-19.   · Wear a facemask when around other people such as sharing a room or vehicle and before entering a healthcare provider's office.  · Cover coughs and sneezes with a tissue. Throw used tissues in a lined trash can immediately and wash hands.  · Clean hands often with soap and water for at least 20 seconds or with an alcohol-based hand , rubbing hands together until they feel dry. Avoid touching your eyes, nose, and mouth with unwashed hands.  · Clean all high-touch; surfaces every day, including counters, tabletops, doorknobs, bathroom fixtures, toilets, phones, keyboards, tablets, bedside tables, etc. Use a household cleaning spray or wipe according to label instructions.  · Avoid sharing personal household items such as dishes, drinking glasses, cups, towels, bedding, etc. After these items are used, they should be washed thoroughly with soap and water.  · Use the following symptom-based strategy to return to normal activity following a suspected or confirmed case of COVID-19.  Continue isolation until:   · At least 3 days (72 hours) have passed since recovery defined as resolution of fever without the use of fever-reducing medications and improvement in respiratory symptoms (e.g. cough, shortness of breath), and   · At least 10 days have passed since symptoms first appeared.    Please follow up with your Primary care provider within 2-5 days if your signs and symptoms have not resolved or worsen.     If your condition worsens or fails to improve we recommend that you receive another evaluation at the emergency room immediately or contact your primary medical clinic to discuss your concerns.   You must understand that you have received an Urgent Care treatment only and that you may be released before all of your medical problems are known or treated. You, the patient, will arrange for follow up care as instructed.     RED FLAGS/WARNING SYMPTOMS DISCUSSED WITH PATIENT THAT WOULD WARRANT EMERGENT MEDICAL ATTENTION. PATIENT VERBALIZED UNDERSTANDING.

## 2020-07-16 NOTE — PROGRESS NOTES
Subjective:       Patient ID: Baron CALEB Shannon is a 25 y.o. male.    Vitals:  height is 6' (1.829 m) and weight is 74.8 kg (165 lb). His oral temperature is 97.8 °F (36.6 °C). His blood pressure is 124/78 and his pulse is 74. His respiration is 16 and oxygen saturation is 97%.     Chief Complaint: COVID-19 Concerns (LOSS OF TASTE)    Other  This is a new problem. The current episode started yesterday. The problem occurs constantly. The problem has been unchanged. Pertinent negatives include no arthralgias, chest pain, chills, congestion, coughing, fatigue, fever, headaches, joint swelling, myalgias, nausea, rash, sore throat, vertigo or vomiting. Nothing aggravates the symptoms. He has tried nothing for the symptoms.       Constitution: Negative for chills, fatigue and fever.   HENT: Negative for congestion and sore throat.         LOSS OF TASTE   Neck: Negative for painful lymph nodes.   Cardiovascular: Negative for chest pain and leg swelling.   Eyes: Negative for double vision and blurred vision.   Respiratory: Negative for cough and shortness of breath.    Gastrointestinal: Negative for nausea, vomiting and diarrhea.   Genitourinary: Negative for dysuria, frequency and urgency.   Musculoskeletal: Negative for joint pain, joint swelling, muscle cramps and muscle ache.   Skin: Negative for color change, pale and rash.   Allergic/Immunologic: Negative for seasonal allergies.   Neurological: Negative for dizziness, history of vertigo, light-headedness, passing out and headaches.   Hematologic/Lymphatic: Negative for swollen lymph nodes, easy bruising/bleeding and history of blood clots. Does not bruise/bleed easily.   Psychiatric/Behavioral: Negative for nervous/anxious, sleep disturbance and depression. The patient is not nervous/anxious.        Objective:      Physical Exam   Constitutional: He is oriented to person, place, and time. He appears well-developed. He is cooperative.  Non-toxic appearance. He does not  appear ill. No distress.   HENT:   Head: Normocephalic and atraumatic.   Ears:   Right Ear: Hearing, tympanic membrane, external ear and ear canal normal.   Left Ear: Hearing, tympanic membrane, external ear and ear canal normal.   Nose: Nose normal. No mucosal edema, rhinorrhea or nasal deformity. No epistaxis. Right sinus exhibits no maxillary sinus tenderness and no frontal sinus tenderness. Left sinus exhibits no maxillary sinus tenderness and no frontal sinus tenderness.   Mouth/Throat: Uvula is midline, oropharynx is clear and moist and mucous membranes are normal. No trismus in the jaw. Normal dentition. No uvula swelling. No oropharyngeal exudate, posterior oropharyngeal edema, posterior oropharyngeal erythema, tonsillar abscesses or cobblestoning. Tonsils are 1+ on the right. Tonsils are 1+ on the left. No tonsillar exudate.   Eyes: Conjunctivae and lids are normal. No scleral icterus.   Neck: Trachea normal, full passive range of motion without pain and phonation normal. Neck supple. No neck rigidity. No edema and no erythema present.   Cardiovascular: Normal rate, regular rhythm, normal heart sounds and normal pulses.   Pulmonary/Chest: Effort normal and breath sounds normal. No accessory muscle usage or stridor. No respiratory distress. He has no decreased breath sounds. He has no wheezes. He has no rhonchi. He has no rales. He exhibits no tenderness and no bony tenderness.   Abdominal: Normal appearance.   Musculoskeletal: Normal range of motion.         General: No deformity.   Lymphadenopathy:        Head (right side): No submental, no submandibular, no tonsillar, no preauricular, no posterior auricular and no occipital adenopathy present.        Head (left side): No submental, no submandibular, no tonsillar, no preauricular, no posterior auricular and no occipital adenopathy present.     He has no cervical adenopathy.        Right cervical: No superficial cervical and no deep cervical adenopathy  present.       Left cervical: No superficial cervical and no deep cervical adenopathy present.   Neurological: He is alert and oriented to person, place, and time. He exhibits normal muscle tone. Coordination normal.   Skin: Skin is warm, dry, intact, not diaphoretic and not pale. Psychiatric: His speech is normal and behavior is normal. Judgment and thought content normal.   Nursing note and vitals reviewed.        Assessment:       1. Loss of taste        Plan:         Loss of taste     Discussed with patient worse on for coronavirus due to symptomatology and exposure to known positive patient.  Discussed at home isolation and precautions. Discussed diagnosis with patient as well as treatment and home care. Discussed return to clinic precautions vs ER precautions. All patients questions answered. Patient verbalized understanding. Patient agreed with plan of care.       Instructions for Patients with Confirmed or Suspected COVID-19    If you are awaiting your test result, you will either be called or it will be released to the patient portal.  If you have any questions about your test, please visit [email or web address] or call our COVID-19 information line at [phone].      Preventing the Spread of Coronavirus Disease 2019 (COVID-19) in Homes and Residential Communities -- Patients     Prevention steps for people with confirmed or suspected COVID-19 (including persons under investigation) who do not need to be hospitalized and people with confirmed COVID-19 who were hospitalized and determined to be medically stable to go home.      Stay home except to get medical care.    Separate yourself from other people and animals in your home.    Call ahead before visiting your doctor.    Wear a face mask.    Cover your coughs and sneezes.    Clean your hands often.    Avoid sharing personal household items.    Clean all high-touch surfaces every day.    Monitor your symptoms. Seek prompt medical  attention if your illness is worsening (e.g., difficulty breathing). Before seeking care, call your healthcare provider.    If you have a medical emergency and must call 911, notify the dispatcher that you have or are being evaluated for COVID-19. If possible, put on a face mask before emergency medical services arrive.    Use the following symptom-based strategy to return to normal activity following a suspected or confirmed case of COVID-19. Continue isolation until:   o At least 3 days (72 hours) have passed since recovery defined as resolution of fever without the use of fever-reducing medications and improvement in respiratory symptoms (e.g. cough, shortness of breath), and   o At least 10 days have passed since symptoms first appeared.     Precautions for household members, intimate partners and caregivers in a non-healthcare setting of a patient with symptomatic laboratory-confirmed COVID-19 or a patient under investigation.     Household members, intimate partners and caregivers in a non-healthcare setting may have close contact with a person with symptomatic, laboratory-confirmed COVID-19 or a person under investigation. Close contacts should monitor their health; they should call their healthcare provider right away if they develop symptoms suggestive of COVID-19 (e.g., fever, cough, shortness of breath). Close contacts should also follow these recommendations:     · Stay home for the duration of the time recommended by healthcare provider, except to get medical care. Separate yourself from other people and animals in the home.  · Monitor the patients symptoms. If the patient is getting sicker, call his or her healthcare provider. If the patient has a medical emergency and you need to call 911, notify the dispatch personnel that the patient has or is being evaluated for COVID-19.   · Wear a facemask when around other people such as sharing a room or vehicle and before entering a healthcare provider's  office.  · Cover coughs and sneezes with a tissue. Throw used tissues in a lined trash can immediately and wash hands.  · Clean hands often with soap and water for at least 20 seconds or with an alcohol-based hand , rubbing hands together until they feel dry. Avoid touching your eyes, nose, and mouth with unwashed hands.  · Clean all high-touch; surfaces every day, including counters, tabletops, doorknobs, bathroom fixtures, toilets, phones, keyboards, tablets, bedside tables, etc. Use a household cleaning spray or wipe according to label instructions.  · Avoid sharing personal household items such as dishes, drinking glasses, cups, towels, bedding, etc. After these items are used, they should be washed thoroughly with soap and water.  · Use the following symptom-based strategy to return to normal activity following a suspected or confirmed case of COVID-19. Continue isolation until:   · At least 3 days (72 hours) have passed since recovery defined as resolution of fever without the use of fever-reducing medications and improvement in respiratory symptoms (e.g. cough, shortness of breath), and   · At least 10 days have passed since symptoms first appeared.    Please follow up with your Primary care provider within 2-5 days if your signs and symptoms have not resolved or worsen.     If your condition worsens or fails to improve we recommend that you receive another evaluation at the emergency room immediately or contact your primary medical clinic to discuss your concerns.   You must understand that you have received an Urgent Care treatment only and that you may be released before all of your medical problems are known or treated. You, the patient, will arrange for follow up care as instructed.     RED FLAGS/WARNING SYMPTOMS DISCUSSED WITH PATIENT THAT WOULD WARRANT EMERGENT MEDICAL ATTENTION. PATIENT VERBALIZED UNDERSTANDING.

## 2020-07-18 ENCOUNTER — TELEPHONE (OUTPATIENT)
Dept: URGENT CARE | Facility: CLINIC | Age: 25
End: 2020-07-18

## 2020-07-18 LAB — SARS-COV-2 RNA RESP QL NAA+PROBE: NOT DETECTED

## 2020-10-02 ENCOUNTER — OFFICE VISIT (OUTPATIENT)
Dept: URGENT CARE | Facility: CLINIC | Age: 25
End: 2020-10-02
Payer: COMMERCIAL

## 2020-10-02 VITALS
HEIGHT: 72 IN | TEMPERATURE: 97 F | RESPIRATION RATE: 16 BRPM | SYSTOLIC BLOOD PRESSURE: 113 MMHG | DIASTOLIC BLOOD PRESSURE: 59 MMHG | BODY MASS INDEX: 26.41 KG/M2 | OXYGEN SATURATION: 97 % | WEIGHT: 195 LBS | HEART RATE: 65 BPM

## 2020-10-02 DIAGNOSIS — R06.02 SOB (SHORTNESS OF BREATH): ICD-10-CM

## 2020-10-02 DIAGNOSIS — J45.41 MODERATE PERSISTENT ASTHMA WITH ACUTE EXACERBATION: Primary | ICD-10-CM

## 2020-10-02 LAB
CTP QC/QA: YES
SARS-COV-2 RDRP RESP QL NAA+PROBE: NEGATIVE

## 2020-10-02 PROCEDURE — 99214 PR OFFICE/OUTPT VISIT, EST, LEVL IV, 30-39 MIN: ICD-10-PCS | Mod: S$GLB,,, | Performed by: NURSE PRACTITIONER

## 2020-10-02 PROCEDURE — U0002 COVID-19 LAB TEST NON-CDC: HCPCS | Mod: QW,S$GLB,, | Performed by: NURSE PRACTITIONER

## 2020-10-02 PROCEDURE — U0002: ICD-10-PCS | Mod: QW,S$GLB,, | Performed by: NURSE PRACTITIONER

## 2020-10-02 PROCEDURE — 99214 OFFICE O/P EST MOD 30 MIN: CPT | Mod: S$GLB,,, | Performed by: NURSE PRACTITIONER

## 2020-10-02 RX ORDER — PREDNISONE 20 MG/1
40 TABLET ORAL DAILY
Qty: 6 TABLET | Refills: 0 | Status: SHIPPED | OUTPATIENT
Start: 2020-10-02 | End: 2020-10-05

## 2020-10-02 NOTE — PATIENT INSTRUCTIONS
Continue to use albuterol (including nebulizer as needed).    Asthma (Adult)  Asthma is a disease where the medium and  small air passages within the lung go into spasm and restrict the flow of air. Inflammation and swelling of the airways cause further restriction. During an acute asthma attack, these factors cause difficulty breathing, wheezing, cough and chest tightness.    An asthma attack can be triggered by many things. Common triggers include infections such as the common cold, bronchitis, pneumonia. Irritants such as smoke or pollutants in the air, emotional upset, and exercise can also trigger an attack. In many adults with asthma, allergies to dust, mold, pollen and animal dander can cause an asthma attack. Skipping doses of daily asthma medicine can also bring on an asthma attack.  Asthma can be controlled using the proper medicines prescribed by your healthcare provider and avoiding exposure to known triggers including allergens and irritants.  Home care  · Take prescribed medicine exactly at the times advised. If you need medicine such as from a hand held inhaler or aerosol breathing machine more than every 4 hours, contact your healthcare provider or seek immediate medical attention. If prescribed an antibiotic or prednisone, take all of the medicine as prescribed, even if you are feeling better after a few days.  · Do not smoke. Avoid being exposed to the smoke of others.  · Some people with asthma have worsening of their symptoms when they take aspirin and non-steroidal or fever-reducing medicines like ibuprofen and naproxen. Talk to your healthcare provider if you think this may apply to you.  Follow-up care  Follow up with your healthcare provider, or as advised. Always bring all of your current medicines to any appointments with your healthcare provider. Also bring a complete list of medications even those not taken for asthma. If you do not already have one, talk to your healthcare provider  "about developing a personalized "Asthma Action Plan."  A pneumococcal (pneumonia) vaccine and yearly flu shot (every fall) are recommended. Ask your doctor about this.  When to seek medical advice  Call your healthcare provider right away if any of these occur:   · Increased wheezing or shortness of breath  · Need to use your inhalers more often than usual without relief  · Fever of 100.4ºF (38ºC) or higher, or as directed by your healthcare provider  · Coughing up lots of dark-colored or bloody sputum (mucus)  · Chest pain with each breath  · If you use a peak flow meter as part of an Asthma Action Plan, and you are still in the yellow zone (50% to 80%) 15 minutes after using inhaler medicine.  Call 911  Call 911 if any of the following occur  · Trouble walking or talking because of shortness of breath  · If you use a peak flow meter as part of an Asthma Action Plan and you are still in the red zone (less than 50%) 15 minutes after using inhaler medicine  · Lips or fingernails turning gray or blue  Date Last Reviewed: 12/2/2015  © 8327-3080 [employer]. [address] [place] [address], [place] [address]. All rights reserved. This information is not intended as a substitute for professional medical care. Always follow your healthcare professional's instructions.        "

## 2020-10-02 NOTE — PROGRESS NOTES
Subjective:       Patient ID: Baron CALEB Shannon is a 25 y.o. male.    Vitals:  height is 6' (1.829 m) and weight is 88.5 kg (195 lb).     Chief Complaint: Shortness of Breath    Pt having SOB and cough for 24 hours and is using rescue inhaler with no relief. Pt was exposed to covid 10 days ago while visiting a friend.    Shortness of Breath  This is a new problem. The current episode started yesterday. The problem occurs constantly. The problem has been gradually worsening. Associated symptoms include wheezing. Pertinent negatives include no ear pain, fever, headaches, hemoptysis, rash, sore throat, sputum production or vomiting. Nothing aggravates the symptoms. He has tried steroid inhalers for the symptoms. The treatment provided no relief. His past medical history is significant for allergies, asthma and bronchiolitis. There is no history of pneumonia.       Constitution: Negative for chills, sweating, fatigue and fever.   HENT: Positive for congestion. Negative for ear pain, sinus pain, sinus pressure, sore throat and voice change.    Neck: Negative for painful lymph nodes.   Eyes: Negative for eye redness.   Respiratory: Positive for cough, shortness of breath, wheezing and asthma. Negative for chest tightness, sputum production, bloody sputum, COPD and stridor.    Gastrointestinal: Negative for nausea and vomiting.   Musculoskeletal: Negative for muscle ache.   Skin: Negative for rash.   Allergic/Immunologic: Positive for asthma. Negative for seasonal allergies.   Neurological: Negative for headaches.   Hematologic/Lymphatic: Negative for swollen lymph nodes.       Objective:      Physical Exam      Assessment:       No diagnosis found.    Plan:         There are no diagnoses linked to this encounter.

## 2020-10-02 NOTE — PROGRESS NOTES
Subjective:       Patient ID: Baron CALEB Shannon is a 25 y.o. male.    Vitals:  height is 6' (1.829 m) and weight is 88.5 kg (195 lb). His temperature is 97.2 °F (36.2 °C). His blood pressure is 113/59 (abnormal) and his pulse is 65. His respiration is 16 and oxygen saturation is 97%.     Chief Complaint: Shortness of Breath (Exposed 10 days ago])    10 days ago exposed to COVID19.     5 days of fatigue. SOB for 2-3 days and worsening.    No fever or chills. No GI related symptoms, including, N/v/D or constipation. No anosmia or ageusia.      Shortness of Breath  This is a new problem. The current episode started yesterday. The problem occurs constantly. The problem has been gradually worsening. Pertinent negatives include no chest pain, fever, headaches, leg swelling, rash, sore throat or vomiting. Nothing aggravates the symptoms. His past medical history is significant for asthma.       Constitution: Negative for chills, fatigue and fever.   HENT: Negative for congestion and sore throat.    Neck: Negative for painful lymph nodes.   Cardiovascular: Negative for chest pain and leg swelling.   Eyes: Negative for double vision and blurred vision.   Respiratory: Negative for cough and shortness of breath.    Gastrointestinal: Negative for nausea, vomiting and diarrhea.   Genitourinary: Negative for dysuria, frequency and urgency.   Musculoskeletal: Negative for joint pain, joint swelling, muscle cramps and muscle ache.   Skin: Negative for color change, pale and rash.   Allergic/Immunologic: Negative for seasonal allergies.   Neurological: Negative for dizziness, history of vertigo, light-headedness, passing out and headaches.   Hematologic/Lymphatic: Negative for swollen lymph nodes, easy bruising/bleeding and history of blood clots. Does not bruise/bleed easily.   Psychiatric/Behavioral: Negative for nervous/anxious, sleep disturbance and depression. The patient is not nervous/anxious.        Objective:      Physical  Exam   Constitutional: He is oriented to person, place, and time. He appears well-developed. He is cooperative.  Non-toxic appearance. He does not appear ill. No distress.   HENT:   Head: Normocephalic and atraumatic.   Ears:   Right Ear: Hearing, tympanic membrane, external ear and ear canal normal.   Left Ear: Hearing, tympanic membrane, external ear and ear canal normal.   Nose: Rhinorrhea present. No mucosal edema or nasal deformity. No epistaxis.   Mouth/Throat: Uvula is midline, oropharynx is clear and moist and mucous membranes are normal. No trismus in the jaw. Normal dentition. No uvula swelling. Cobblestoning present. No oropharyngeal exudate, posterior oropharyngeal edema or posterior oropharyngeal erythema. Tonsils are 1+ on the right. Tonsils are 1+ on the left. No tonsillar exudate.   Eyes: Conjunctivae and lids are normal. No scleral icterus.   Neck: Trachea normal, full passive range of motion without pain and phonation normal. Neck supple. No neck rigidity. No edema and no erythema present.   Cardiovascular: Normal rate, regular rhythm, normal heart sounds and normal pulses.   Pulmonary/Chest: Effort normal. No stridor. No respiratory distress. He has no decreased breath sounds. He has wheezes. He has no rhonchi. He has no rales.   Abdominal: Normal appearance.   Musculoskeletal: Normal range of motion.         General: No deformity.   Neurological: He is alert and oriented to person, place, and time. He exhibits normal muscle tone. Coordination normal.   Skin: Skin is warm, dry, intact, not diaphoretic and not pale. Psychiatric: His speech is normal and behavior is normal. Judgment and thought content normal.   Nursing note and vitals reviewed.    Results for orders placed or performed in visit on 07/15/20   COVID-19 Routine Screening   Result Value Ref Range    SARS-CoV2 (COVID-19) Qualitative PCR Not Detected Not Detected           Assessment:       1. Moderate persistent asthma with acute  exacerbation    2. SOB (shortness of breath)        Plan:       Labs ordered at this visit reviewed.     Moderate persistent asthma with acute exacerbation  -     predniSONE (DELTASONE) 20 MG tablet; Take 2 tablets (40 mg total) by mouth once daily. for 3 days  Dispense: 6 tablet; Refill: 0    SOB (shortness of breath)  -     POCT COVID-19 Rapid Screening      Patient Instructions     Continue to use albuterol (including nebulizer as needed).    Asthma (Adult)  Asthma is a disease where the medium and  small air passages within the lung go into spasm and restrict the flow of air. Inflammation and swelling of the airways cause further restriction. During an acute asthma attack, these factors cause difficulty breathing, wheezing, cough and chest tightness.    An asthma attack can be triggered by many things. Common triggers include infections such as the common cold, bronchitis, pneumonia. Irritants such as smoke or pollutants in the air, emotional upset, and exercise can also trigger an attack. In many adults with asthma, allergies to dust, mold, pollen and animal dander can cause an asthma attack. Skipping doses of daily asthma medicine can also bring on an asthma attack.  Asthma can be controlled using the proper medicines prescribed by your healthcare provider and avoiding exposure to known triggers including allergens and irritants.  Home care  · Take prescribed medicine exactly at the times advised. If you need medicine such as from a hand held inhaler or aerosol breathing machine more than every 4 hours, contact your healthcare provider or seek immediate medical attention. If prescribed an antibiotic or prednisone, take all of the medicine as prescribed, even if you are feeling better after a few days.  · Do not smoke. Avoid being exposed to the smoke of others.  · Some people with asthma have worsening of their symptoms when they take aspirin and non-steroidal or fever-reducing medicines like ibuprofen and  "naproxen. Talk to your healthcare provider if you think this may apply to you.  Follow-up care  Follow up with your healthcare provider, or as advised. Always bring all of your current medicines to any appointments with your healthcare provider. Also bring a complete list of medications even those not taken for asthma. If you do not already have one, talk to your healthcare provider about developing a personalized "Asthma Action Plan."  A pneumococcal (pneumonia) vaccine and yearly flu shot (every fall) are recommended. Ask your doctor about this.  When to seek medical advice  Call your healthcare provider right away if any of these occur:   · Increased wheezing or shortness of breath  · Need to use your inhalers more often than usual without relief  · Fever of 100.4ºF (38ºC) or higher, or as directed by your healthcare provider  · Coughing up lots of dark-colored or bloody sputum (mucus)  · Chest pain with each breath  · If you use a peak flow meter as part of an Asthma Action Plan, and you are still in the yellow zone (50% to 80%) 15 minutes after using inhaler medicine.  Call 911  Call 911 if any of the following occur  · Trouble walking or talking because of shortness of breath  · If you use a peak flow meter as part of an Asthma Action Plan and you are still in the red zone (less than 50%) 15 minutes after using inhaler medicine  · Lips or fingernails turning gray or blue  Date Last Reviewed: 12/2/2015  © 3411-5268 [employer]. [address] [place] [address], [place] [address]. All rights reserved. This information is not intended as a substitute for professional medical care. Always follow your healthcare professional's instructions.                 "

## 2021-07-17 ENCOUNTER — OFFICE VISIT (OUTPATIENT)
Dept: URGENT CARE | Facility: CLINIC | Age: 26
End: 2021-07-17
Payer: COMMERCIAL

## 2021-07-17 VITALS
HEART RATE: 59 BPM | RESPIRATION RATE: 20 BRPM | DIASTOLIC BLOOD PRESSURE: 68 MMHG | WEIGHT: 195 LBS | HEIGHT: 72 IN | OXYGEN SATURATION: 97 % | BODY MASS INDEX: 26.41 KG/M2 | TEMPERATURE: 98 F | SYSTOLIC BLOOD PRESSURE: 112 MMHG

## 2021-07-17 DIAGNOSIS — Z76.0 MEDICATION REFILL: Primary | ICD-10-CM

## 2021-07-17 LAB
CTP QC/QA: YES
SARS-COV-2 RDRP RESP QL NAA+PROBE: NEGATIVE

## 2021-07-17 PROCEDURE — 3008F PR BODY MASS INDEX (BMI) DOCUMENTED: ICD-10-PCS | Mod: CPTII,S$GLB,, | Performed by: PHYSICIAN ASSISTANT

## 2021-07-17 PROCEDURE — 99214 PR OFFICE/OUTPT VISIT, EST, LEVL IV, 30-39 MIN: ICD-10-PCS | Mod: S$GLB,,, | Performed by: PHYSICIAN ASSISTANT

## 2021-07-17 PROCEDURE — 3008F BODY MASS INDEX DOCD: CPT | Mod: CPTII,S$GLB,, | Performed by: PHYSICIAN ASSISTANT

## 2021-07-17 PROCEDURE — 99214 OFFICE O/P EST MOD 30 MIN: CPT | Mod: S$GLB,,, | Performed by: PHYSICIAN ASSISTANT

## 2021-07-17 PROCEDURE — U0002: ICD-10-PCS | Mod: QW,S$GLB,, | Performed by: PHYSICIAN ASSISTANT

## 2021-07-17 PROCEDURE — U0002 COVID-19 LAB TEST NON-CDC: HCPCS | Mod: QW,S$GLB,, | Performed by: PHYSICIAN ASSISTANT

## 2021-07-17 RX ORDER — ALBUTEROL SULFATE 90 UG/1
2 AEROSOL, METERED RESPIRATORY (INHALATION) EVERY 6 HOURS PRN
Qty: 18 G | Refills: 0 | Status: SHIPPED | OUTPATIENT
Start: 2021-07-17 | End: 2022-07-17

## 2021-09-10 ENCOUNTER — OFFICE VISIT (OUTPATIENT)
Dept: URGENT CARE | Facility: CLINIC | Age: 26
End: 2021-09-10
Payer: COMMERCIAL

## 2021-09-10 VITALS
HEART RATE: 94 BPM | RESPIRATION RATE: 18 BRPM | WEIGHT: 200 LBS | OXYGEN SATURATION: 96 % | BODY MASS INDEX: 28 KG/M2 | HEIGHT: 71 IN | SYSTOLIC BLOOD PRESSURE: 106 MMHG | TEMPERATURE: 99 F | DIASTOLIC BLOOD PRESSURE: 72 MMHG

## 2021-09-10 DIAGNOSIS — Z20.822 ENCOUNTER FOR LABORATORY TESTING FOR COVID-19 VIRUS: ICD-10-CM

## 2021-09-10 DIAGNOSIS — U07.1 COVID-19 VIRUS INFECTION: Primary | ICD-10-CM

## 2021-09-10 LAB
CTP QC/QA: YES
SARS-COV-2 RDRP RESP QL NAA+PROBE: POSITIVE

## 2021-09-10 PROCEDURE — 1159F MED LIST DOCD IN RCRD: CPT | Mod: CPTII,S$GLB,, | Performed by: PHYSICIAN ASSISTANT

## 2021-09-10 PROCEDURE — 99213 OFFICE O/P EST LOW 20 MIN: CPT | Mod: S$GLB,,, | Performed by: PHYSICIAN ASSISTANT

## 2021-09-10 PROCEDURE — 99213 PR OFFICE/OUTPT VISIT, EST, LEVL III, 20-29 MIN: ICD-10-PCS | Mod: S$GLB,,, | Performed by: PHYSICIAN ASSISTANT

## 2021-09-10 PROCEDURE — 3008F PR BODY MASS INDEX (BMI) DOCUMENTED: ICD-10-PCS | Mod: CPTII,S$GLB,, | Performed by: PHYSICIAN ASSISTANT

## 2021-09-10 PROCEDURE — 3074F SYST BP LT 130 MM HG: CPT | Mod: CPTII,S$GLB,, | Performed by: PHYSICIAN ASSISTANT

## 2021-09-10 PROCEDURE — 3008F BODY MASS INDEX DOCD: CPT | Mod: CPTII,S$GLB,, | Performed by: PHYSICIAN ASSISTANT

## 2021-09-10 PROCEDURE — 3078F DIAST BP <80 MM HG: CPT | Mod: CPTII,S$GLB,, | Performed by: PHYSICIAN ASSISTANT

## 2021-09-10 PROCEDURE — 3078F PR MOST RECENT DIASTOLIC BLOOD PRESSURE < 80 MM HG: ICD-10-PCS | Mod: CPTII,S$GLB,, | Performed by: PHYSICIAN ASSISTANT

## 2021-09-10 PROCEDURE — 1159F PR MEDICATION LIST DOCUMENTED IN MEDICAL RECORD: ICD-10-PCS | Mod: CPTII,S$GLB,, | Performed by: PHYSICIAN ASSISTANT

## 2021-09-10 PROCEDURE — 1160F RVW MEDS BY RX/DR IN RCRD: CPT | Mod: CPTII,S$GLB,, | Performed by: PHYSICIAN ASSISTANT

## 2021-09-10 PROCEDURE — U0002: ICD-10-PCS | Mod: QW,S$GLB,, | Performed by: PHYSICIAN ASSISTANT

## 2021-09-10 PROCEDURE — U0002 COVID-19 LAB TEST NON-CDC: HCPCS | Mod: QW,S$GLB,, | Performed by: PHYSICIAN ASSISTANT

## 2021-09-10 PROCEDURE — 1160F PR REVIEW ALL MEDS BY PRESCRIBER/CLIN PHARMACIST DOCUMENTED: ICD-10-PCS | Mod: CPTII,S$GLB,, | Performed by: PHYSICIAN ASSISTANT

## 2021-09-10 PROCEDURE — 3074F PR MOST RECENT SYSTOLIC BLOOD PRESSURE < 130 MM HG: ICD-10-PCS | Mod: CPTII,S$GLB,, | Performed by: PHYSICIAN ASSISTANT

## 2021-09-14 ENCOUNTER — INFUSION (OUTPATIENT)
Dept: INFECTIOUS DISEASES | Facility: HOSPITAL | Age: 26
End: 2021-09-14
Attending: INTERNAL MEDICINE
Payer: COMMERCIAL

## 2021-09-14 VITALS
BODY MASS INDEX: 27.09 KG/M2 | HEART RATE: 77 BPM | HEIGHT: 72 IN | TEMPERATURE: 98 F | DIASTOLIC BLOOD PRESSURE: 68 MMHG | OXYGEN SATURATION: 86 % | SYSTOLIC BLOOD PRESSURE: 126 MMHG | WEIGHT: 200 LBS | RESPIRATION RATE: 20 BRPM

## 2021-09-14 DIAGNOSIS — U07.1 COVID-19: Primary | ICD-10-CM

## 2021-09-14 PROCEDURE — M0243 CASIRIVI AND IMDEVI INFUSION: HCPCS | Performed by: INTERNAL MEDICINE

## 2021-09-14 PROCEDURE — 63600175 PHARM REV CODE 636 W HCPCS: Performed by: INTERNAL MEDICINE

## 2021-09-14 PROCEDURE — 25000003 PHARM REV CODE 250: Performed by: INTERNAL MEDICINE

## 2021-09-14 RX ORDER — EPINEPHRINE 0.3 MG/.3ML
0.3 INJECTION SUBCUTANEOUS
Status: ACTIVE | OUTPATIENT
Start: 2021-09-14

## 2021-09-14 RX ORDER — SODIUM CHLORIDE 0.9 % (FLUSH) 0.9 %
10 SYRINGE (ML) INJECTION
Status: ACTIVE | OUTPATIENT
Start: 2021-09-14

## 2021-09-14 RX ORDER — ONDANSETRON 4 MG/1
4 TABLET, ORALLY DISINTEGRATING ORAL ONCE AS NEEDED
Status: ACTIVE | OUTPATIENT
Start: 2021-09-14 | End: 2033-02-10

## 2021-09-14 RX ORDER — ACETAMINOPHEN 325 MG/1
650 TABLET ORAL ONCE AS NEEDED
Status: ACTIVE | OUTPATIENT
Start: 2021-09-14 | End: 2033-02-10

## 2021-09-14 RX ORDER — ALBUTEROL SULFATE 90 UG/1
2 AEROSOL, METERED RESPIRATORY (INHALATION)
Status: ACTIVE | OUTPATIENT
Start: 2021-09-14

## 2021-09-14 RX ORDER — DIPHENHYDRAMINE HYDROCHLORIDE 50 MG/ML
25 INJECTION INTRAMUSCULAR; INTRAVENOUS ONCE AS NEEDED
Status: ACTIVE | OUTPATIENT
Start: 2021-09-14 | End: 2033-02-10

## 2021-09-14 RX ADMIN — CASIRIVIMAB AND IMDEVIMAB 600 MG: 600; 600 INJECTION, SOLUTION, CONCENTRATE INTRAVENOUS at 10:09

## 2021-10-25 ENCOUNTER — OFFICE VISIT (OUTPATIENT)
Dept: URGENT CARE | Facility: CLINIC | Age: 26
End: 2021-10-25

## 2021-10-25 VITALS
RESPIRATION RATE: 16 BRPM | HEART RATE: 70 BPM | WEIGHT: 185 LBS | OXYGEN SATURATION: 98 % | SYSTOLIC BLOOD PRESSURE: 106 MMHG | HEIGHT: 72 IN | BODY MASS INDEX: 25.06 KG/M2 | DIASTOLIC BLOOD PRESSURE: 73 MMHG | TEMPERATURE: 98 F

## 2021-10-25 DIAGNOSIS — Z86.16 HISTORY OF COVID-19: ICD-10-CM

## 2021-10-25 DIAGNOSIS — Z76.0 MEDICATION REFILL: Primary | ICD-10-CM

## 2021-10-25 PROCEDURE — 1160F RVW MEDS BY RX/DR IN RCRD: CPT | Mod: CPTII,S$GLB,, | Performed by: PHYSICIAN ASSISTANT

## 2021-10-25 PROCEDURE — 3074F PR MOST RECENT SYSTOLIC BLOOD PRESSURE < 130 MM HG: ICD-10-PCS | Mod: CPTII,S$GLB,, | Performed by: PHYSICIAN ASSISTANT

## 2021-10-25 PROCEDURE — 3074F SYST BP LT 130 MM HG: CPT | Mod: CPTII,S$GLB,, | Performed by: PHYSICIAN ASSISTANT

## 2021-10-25 PROCEDURE — 1160F PR REVIEW ALL MEDS BY PRESCRIBER/CLIN PHARMACIST DOCUMENTED: ICD-10-PCS | Mod: CPTII,S$GLB,, | Performed by: PHYSICIAN ASSISTANT

## 2021-10-25 PROCEDURE — 3078F DIAST BP <80 MM HG: CPT | Mod: CPTII,S$GLB,, | Performed by: PHYSICIAN ASSISTANT

## 2021-10-25 PROCEDURE — 3078F PR MOST RECENT DIASTOLIC BLOOD PRESSURE < 80 MM HG: ICD-10-PCS | Mod: CPTII,S$GLB,, | Performed by: PHYSICIAN ASSISTANT

## 2021-10-25 PROCEDURE — 1159F MED LIST DOCD IN RCRD: CPT | Mod: CPTII,S$GLB,, | Performed by: PHYSICIAN ASSISTANT

## 2021-10-25 PROCEDURE — 99213 PR OFFICE/OUTPT VISIT, EST, LEVL III, 20-29 MIN: ICD-10-PCS | Mod: S$GLB,,, | Performed by: PHYSICIAN ASSISTANT

## 2021-10-25 PROCEDURE — 3008F PR BODY MASS INDEX (BMI) DOCUMENTED: ICD-10-PCS | Mod: CPTII,S$GLB,, | Performed by: PHYSICIAN ASSISTANT

## 2021-10-25 PROCEDURE — 3008F BODY MASS INDEX DOCD: CPT | Mod: CPTII,S$GLB,, | Performed by: PHYSICIAN ASSISTANT

## 2021-10-25 PROCEDURE — 1159F PR MEDICATION LIST DOCUMENTED IN MEDICAL RECORD: ICD-10-PCS | Mod: CPTII,S$GLB,, | Performed by: PHYSICIAN ASSISTANT

## 2021-10-25 PROCEDURE — 99213 OFFICE O/P EST LOW 20 MIN: CPT | Mod: S$GLB,,, | Performed by: PHYSICIAN ASSISTANT

## 2021-10-25 RX ORDER — ALBUTEROL SULFATE 90 UG/1
1-2 AEROSOL, METERED RESPIRATORY (INHALATION) EVERY 6 HOURS PRN
Qty: 18 G | Refills: 1 | Status: SHIPPED | OUTPATIENT
Start: 2021-10-25 | End: 2022-10-25

## 2022-01-10 ENCOUNTER — OFFICE VISIT (OUTPATIENT)
Dept: ORTHOPEDICS | Facility: CLINIC | Age: 27
End: 2022-01-10
Payer: COMMERCIAL

## 2022-01-10 ENCOUNTER — HOSPITAL ENCOUNTER (OUTPATIENT)
Dept: RADIOLOGY | Facility: HOSPITAL | Age: 27
Discharge: HOME | End: 2022-01-10
Attending: ORTHOPAEDIC SURGERY
Payer: COMMERCIAL

## 2022-01-10 DIAGNOSIS — M50.30 DDD (DEGENERATIVE DISC DISEASE), CERVICAL: ICD-10-CM

## 2022-01-10 DIAGNOSIS — G43.009 MIGRAINE WITHOUT AURA AND WITHOUT STATUS MIGRAINOSUS, NOT INTRACTABLE: ICD-10-CM

## 2022-01-10 DIAGNOSIS — M50.30 DDD (DEGENERATIVE DISC DISEASE), CERVICAL: Primary | ICD-10-CM

## 2022-01-10 PROCEDURE — 1159F MED LIST DOCD IN RCRD: CPT | Mod: CPTII,S$GLB,, | Performed by: ORTHOPAEDIC SURGERY

## 2022-01-10 PROCEDURE — 72050 XR CERVICAL SPINE AP LAT WITH FLEX EXTEN: ICD-10-PCS | Mod: 26,,, | Performed by: RADIOLOGY

## 2022-01-10 PROCEDURE — 1159F PR MEDICATION LIST DOCUMENTED IN MEDICAL RECORD: ICD-10-PCS | Mod: CPTII,S$GLB,, | Performed by: ORTHOPAEDIC SURGERY

## 2022-01-10 PROCEDURE — 72050 X-RAY EXAM NECK SPINE 4/5VWS: CPT | Mod: 26,,, | Performed by: RADIOLOGY

## 2022-01-10 PROCEDURE — 72050 X-RAY EXAM NECK SPINE 4/5VWS: CPT | Mod: TC

## 2022-01-10 PROCEDURE — 1160F PR REVIEW ALL MEDS BY PRESCRIBER/CLIN PHARMACIST DOCUMENTED: ICD-10-PCS | Mod: CPTII,S$GLB,, | Performed by: ORTHOPAEDIC SURGERY

## 2022-01-10 PROCEDURE — 99999 PR PBB SHADOW E&M-EST. PATIENT-LVL IV: CPT | Mod: PBBFAC,,, | Performed by: ORTHOPAEDIC SURGERY

## 2022-01-10 PROCEDURE — 99204 OFFICE O/P NEW MOD 45 MIN: CPT | Mod: S$GLB,,, | Performed by: ORTHOPAEDIC SURGERY

## 2022-01-10 PROCEDURE — 99999 PR PBB SHADOW E&M-EST. PATIENT-LVL IV: ICD-10-PCS | Mod: PBBFAC,,, | Performed by: ORTHOPAEDIC SURGERY

## 2022-01-10 PROCEDURE — 1160F RVW MEDS BY RX/DR IN RCRD: CPT | Mod: CPTII,S$GLB,, | Performed by: ORTHOPAEDIC SURGERY

## 2022-01-10 PROCEDURE — 99204 PR OFFICE/OUTPT VISIT, NEW, LEVL IV, 45-59 MIN: ICD-10-PCS | Mod: S$GLB,,, | Performed by: ORTHOPAEDIC SURGERY

## 2022-01-10 RX ORDER — CYCLOBENZAPRINE HCL 10 MG
10 TABLET ORAL 3 TIMES DAILY PRN
Qty: 60 TABLET | Refills: 1 | Status: SHIPPED | OUTPATIENT
Start: 2022-01-10

## 2022-01-10 NOTE — PROGRESS NOTES
DATE: 1/10/2022  PATIENT: Baron CALEB Shannon    Attending Physician: Hola Terry M.D.    CHIEF COMPLAINT: neck pain and headaches    HISTORY:  Baron CALEB Shannon is a 26 y.o. male here for initial evaluation of neck pain (Neck - 5). The pain has been present for 8 months. The patient describes the pain as dull and it locates behind the ears and radiates anteriorly to the temporal areas lateral to the eyes. The pain is worse with head turning and improved by rest. He also has migraine headaches that happens 3 times a week, which is sensitive to lights. He denies of visual auras. There is no associated numbness and tingling. There is no subjective weakness. Prior treatments have included excedrin migraine with significant relief, but no PT, injections or surgery.     The Patient denies myelopathic symptoms such as handwriting changes or difficulty with buttons/coins/keys. Denies perineal paresthesias, bowel/bladder dysfunction.    The patient does not smoke, have DM or endorse IVDU. The patient is not on any blood thinners and does not take chronic narcotics. He works as a research associate.    PAST MEDICAL/SURGICAL HISTORY:  Past Medical History:   Diagnosis Date    Accidental poisoning by second-hand tobacco smoke(E869.4)     ADHD (attention deficit hyperactivity disorder)     Allergy     Asthma, not well controlled     Asthma, well controlled     Atopy     IgE 400, immuncap positive for multiple aeroallergens    Eczema     eczema is well controlled    Patient non adherence     Rhinitis, allergic      Past Surgical History:   Procedure Laterality Date    Nasal polyposis Bilateral     NASAL SINUS SURGERY  01/09/2018       Current Medications:   Current Outpatient Medications:     albuterol (PROAIR HFA) 90 mcg/actuation inhaler, Inhale 2 puffs into the lungs every 4 (four) hours as needed for Wheezing or Shortness of Breath. Rescue, Disp: 8.5 Inhaler, Rfl: 3    albuterol (PROVENTIL HFA) 90 mcg/actuation  inhaler, Inhale 2 puffs into the lungs every 6 (six) hours as needed for Wheezing. Rescue, Disp: 18 g, Rfl: 0    albuterol (PROVENTIL) 2.5 mg /3 mL (0.083 %) nebulizer solution, Use one vial as needed every 4 hours as needed, Disp: 50 mL, Rfl: 0    albuterol (PROVENTIL/VENTOLIN HFA) 90 mcg/actuation inhaler, Inhale 1-2 puffs into the lungs every 6 (six) hours as needed for Wheezing. Rescue, Disp: 18 g, Rfl: 1    budesonide-formoterol 160-4.5 mcg (SYMBICORT) 160-4.5 mcg/actuation HFAA, Inhale 2 puffs into the lungs every 12 (twelve) hours. Controller, Disp: 10.2 g, Rfl: 11    compressor, for nebulizer Etelvina, 1 Device by Misc.(Non-Drug; Combo Route) route as needed., Disp: 1 Device, Rfl: 0    dextroamphetamine-amphetamine (ADDERALL) 15 mg tablet, TK 1 T PO BID, Disp: , Rfl: 0    epinephrine (EPIPEN JR) 0.15 mg/0.3 mL (1:2,000) pen injection, Inject 0.3 mLs (0.15 mg total) into the muscle once as needed for Anaphylaxis., Disp: 1 each, Rfl: 6    fluticasone (FLONASE) 50 mcg/actuation nasal spray, 2 sprays (100 mcg total) by Each Nare route once daily., Disp: 1 Bottle, Rfl: 12    fluticasone (FLONASE) 50 mcg/actuation nasal spray, 2 sprays (100 mcg total) by Each Nare route 2 (two) times daily., Disp: 2 Bottle, Rfl: 11    fluticasone propionate (FLONASE ALLERGY RELIEF) 50 mcg/actuation nasal spray, 1 spray (50 mcg total) by Each Nostril route daily as needed., Disp: 1 Bottle, Rfl: 0    mometasone-formoterol (DULERA) 200-5 mcg/actuation inhaler, Inhale 2 puffs into the lungs 2 (two) times daily., Disp: 2 Inhaler, Rfl: 3    montelukast (SINGULAIR) 10 mg tablet, Take 1 tablet (10 mg total) by mouth every evening., Disp: 30 tablet, Rfl: 11    mupirocin (BACTROBAN) 2 % ointment, Apply to affected area 3 times daily., Disp: 1 Tube, Rfl: 0    PROAIR HFA 90 mcg/actuation inhaler, INHALE 2 PUFFS INTO THE LUNGS EVERY 4 HOURS AS NEEDED FOR WHEEZING OR SHORTNESS OF BREATH, Disp: 8.5 Inhaler, Rfl: 3     promethazine-dextromethorphan (PROMETHAZINE-DM) 6.25-15 mg/5 mL Syrp, Take 5 mLs by mouth every 6 (six) hours., Disp: 120 mL, Rfl: 0    SINGULAIR 10 mg tablet, TAKE 1 TABLET BY MOUTH AT BEDTIME, Disp: 30 tablet, Rfl: 0    cyclobenzaprine (FLEXERIL) 10 MG tablet, Take 1 tablet (10 mg total) by mouth 3 (three) times daily as needed for Muscle spasms., Disp: 60 tablet, Rfl: 1    levocetirizine (XYZAL) 5 MG tablet, Take 1 tablet (5 mg total) by mouth every evening., Disp: 30 tablet, Rfl: 11    Current Facility-Administered Medications:     acetaminophen tablet 650 mg, 650 mg, Oral, Once PRN, Demetris Page MD    albuterol inhaler 2 puff, 2 puff, Inhalation, Q20 Min PRN, Demetris Page MD    diphenhydrAMINE injection 25 mg, 25 mg, Intravenous, Once PRN, Demetris Page MD    EPINEPHrine (EPIPEN) 0.3 mg/0.3 mL pen injection 0.3 mg, 0.3 mg, Intramuscular, PRN, Demetris Page MD    methylPREDNISolone sodium succinate injection 40 mg, 40 mg, Intravenous, Once PRN, Demetris Page MD    ondansetron disintegrating tablet 4 mg, 4 mg, Oral, Once PRN, Demetris Page MD    sodium chloride 0.9% 500 mL flush bag, , Intravenous, PRN, Demetris Page MD    sodium chloride 0.9% flush 10 mL, 10 mL, Intravenous, PRN, Demetris Page MD    Social History:   Social History     Socioeconomic History    Marital status: Single   Tobacco Use    Smoking status: Never Smoker    Smokeless tobacco: Never Used    Tobacco comment: Room mate   Substance and Sexual Activity    Alcohol use: Yes     Alcohol/week: 0.0 standard drinks     Comment: Socially    Drug use: No    Sexual activity: Yes   Social History Narrative    Lives at home with parents, two sisters, and his brother       REVIEW OF SYSTEMS:  Constitution: Negative. Negative for chills, fever and night sweats.   Cardiovascular: Negative for chest pain and syncope.   Respiratory: Negative for cough and shortness of breath.   Gastrointestinal: See  HPI. Negative for nausea/vomiting. Negative for abdominal pain.  Genitourinary: See HPI. Negative for discoloration or dysuria.  Skin: Negative for dry skin, itching and rash.   Hematologic/Lymphatic: negative for bleeding/clotting disorders.   Musculoskeletal: Negative for falls and muscle weakness.   Neurological: See HPI. no history of seizures. no history of cranial surgery or shunts.  Endocrine: Negative for polydipsia, polyphagia and polyuria.   Allergic/Immunologic: Negative for hives and persistent infections.  Psychiatric/Behavioral: Negative for depression and insomnia.         EXAM:  There were no vitals taken for this visit.    General: The patient is a 26 y.o. male in no apparent distress, the patient is orientatied to person, place and time.  Psych: Normal mood and affect  HEENT: Vision grossly intact, hearing intact to the spoken word.  Lungs: Respirations unlabored.  Gait: Normal station and gait, no difficulty with toe or heel walk.   Skin: Cervical skin negative for rashes, lesions, hairy patches and surgical scars.  Range of motion: Cervical range of motion is acceptable. There is posterior cervical tenderness to palpation.  Spinal Balance: Global saggital and coronal spinal balance acceptable, no significant for scoliosis and kyphosis.  Musculoskeletal: No pain with the range of motion of the bilateral shoulders and elbows. Normal bulk and contour of the bilateral hands.  Vascular: Bilateral hands warm and well perfused, Radial pulses 2+ bilaterally.  Neurological: Normal strength and tone in all major motor groups in the bilateral upper and lower extremities. Normal sensation to light touch in the C5-T1 and L2-S1 dermatomes bilaterally.  Deep tendon reflexes symmetric 2+ in the bilateral upper and lower extremities.  Negative Inverted Radial Reflex and Archer's bilaterally. Negative Babinski bilaterally.     IMAGING:   Today I personally reviewed AP, Lat and Flex/Ex  upright C-spine films that  demonstrate no fractures, dislocations or listhesis.      There is no height or weight on file to calculate BMI.  No results found for: HGBA1C    ASSESSMENT/PLAN:  DDD (degenerative disc disease), cervical  -     X-Ray Cervical Spine AP Lat with Flexion  Extension; Future; Expected date: 01/10/2022  -     cyclobenzaprine (FLEXERIL) 10 MG tablet; Take 1 tablet (10 mg total) by mouth 3 (three) times daily as needed for Muscle spasms.  Dispense: 60 tablet; Refill: 1  -     Ambulatory referral/consult to Physical/Occupational Therapy; Future; Expected date: 01/17/2022    Migraine without aura and without status migrainosus, not intractable  -     Ambulatory referral/consult to Neurology; Future; Expected date: 01/17/2022      Follow up if symptoms worsen or fail to improve.    Patient has neck pain and migraine headaches. His pain around ears can be from upper cervical radiculopathy. I discussed the natural history of their diagnoses as well as surgical and nonsurgical treatment options. I educated the patient on the importance of core/back strengthening, correct posture, bending/lifting ergonomics, and low-impact aerobic exercises (walking, elliptical, and aquatherapy). I will refer the patient to Neurology for headache evaluation. I prescribed flexeril and sent him to PT. Patient will follow up PRN. Next step is a cervical MRI w/o contrast.    Hola Terry MD  Orthopaedic Spine Surgeon  Department of Orthopaedic Surgery  [phone]

## 2022-01-13 ENCOUNTER — CLINICAL SUPPORT (OUTPATIENT)
Dept: REHABILITATION | Facility: OTHER | Age: 27
End: 2022-01-13
Attending: ORTHOPAEDIC SURGERY
Payer: COMMERCIAL

## 2022-01-13 ENCOUNTER — TELEPHONE (OUTPATIENT)
Dept: NEUROLOGY | Facility: CLINIC | Age: 27
End: 2022-01-13
Payer: COMMERCIAL

## 2022-01-13 DIAGNOSIS — R29.3 POOR POSTURE: ICD-10-CM

## 2022-01-13 DIAGNOSIS — M50.30 DDD (DEGENERATIVE DISC DISEASE), CERVICAL: ICD-10-CM

## 2022-01-13 DIAGNOSIS — R29.898 DECREASED RANGE OF MOTION OF NECK: ICD-10-CM

## 2022-01-13 DIAGNOSIS — M53.82 NECK MUSCLE WEAKNESS: ICD-10-CM

## 2022-01-13 PROCEDURE — 97110 THERAPEUTIC EXERCISES: CPT | Mod: 96

## 2022-01-13 PROCEDURE — 97140 MANUAL THERAPY 1/> REGIONS: CPT | Mod: 96

## 2022-01-13 PROCEDURE — 97161 PT EVAL LOW COMPLEX 20 MIN: CPT | Mod: 96

## 2022-01-13 NOTE — TELEPHONE ENCOUNTER
Referral received from Dr. Terry's office for migraine eval. Cervical XR on file (negative). Pt has accepted an appt with LYUDMILA Salazar for 1/18/22 @ 1:00pm here at [facility]. Appt letter scanned/emailed to [email or web address].

## 2022-01-14 PROBLEM — R29.3 POOR POSTURE: Status: ACTIVE | Noted: 2022-01-14

## 2022-01-14 PROBLEM — R29.898 DECREASED RANGE OF MOTION OF NECK: Status: ACTIVE | Noted: 2022-01-14

## 2022-01-14 PROBLEM — M53.82 NECK MUSCLE WEAKNESS: Status: ACTIVE | Noted: 2022-01-14

## 2022-01-14 NOTE — PLAN OF CARE
[facility]  Physical Therapy Initial Evaluation    Date: 1/13/2022   Name: Baron CALEB Shannon  [facility] Number: [id number]    Therapy Diagnosis:   Encounter Diagnoses   Name Primary?    DDD (degenerative disc disease), cervical     Decreased range of motion of neck     Neck muscle weakness     Poor posture      Physician: Hola Terry MD    Physician Orders: PT Eval and Treat   Medical Diagnosis from Referral: M50.30 (ICD-10-CM) - DDD (degenerative disc disease), cervical  Evaluation Date: 1/13/2022  Authorization Period Expiration: 12/31/2022  Plan of Care Expiration: 3/13/2022  Visit # / Visits authorized: 1/ 1   Progress Note Due: 2/13/2022  FOTO: 1/ 1    Precautions: Standard    Time In: 3:00 pm  Time Out: 4:00 pm  Total Appointment Time (timed & untimed codes): 55 minutes    Subjective   Date of onset: 6 months  History of current condition -  reports: his neck feels stiff at all times. he has been dealing with really bad neck pain and headaches for about 6 months. He was originally able to work it out with massages and a theracane, but the pain has returned. The pain is in bilateral sides of the neck and radiates to the area of the temple and sometimes in the area of his forehead. The pain is described as a tight heavy knot is his skull. The pain is worst at the end of the day after a full day of work and he will have trouble getting to sleep sometimes. Aggravating factors include looking down, looking up, sleeping, pressure on the neck or back of the head. He will have to turn his body when looking back while driving due to neck ROM limitations. Easing factors include excedrine migraine, massage, hot showers, darkness and quiet. He has not tried ice or heat.    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     COMPARISON:  None.     FINDINGS:  C1-C2: Pre-dens space is maintained.  Dens and lateral masses of C1 are unremarkable.     Alignment: Alignment is maintained.  Straightening of lordosis noted.  No dynamic instability.     Vertebrae: Vertebral body heights are maintained.  No suspicious appearing lytic or blastic lesions.     Discs and facets: Disc heights are maintained.  Facet joints are unremarkable.     Miscellaneous: No additional findings.     Impression:     Straightening of cervical lordosis.  Otherwise unremarkable examination.     Medical History:   Past Medical History:   Diagnosis Date    Accidental poisoning by second-hand tobacco smoke(E869.4)     ADHD (attention deficit hyperactivity disorder)     Allergy     Asthma, not well controlled     Asthma, well controlled     Atopy     IgE 400, immuncap positive for multiple aeroallergens    Eczema     eczema is well controlled    Patient non adherence     Rhinitis, allergic        Surgical History:   Baron CALEB Shannon  has a past surgical history that includes Nasal sinus surgery (01/09/2018) and Nasal " polyposis (Bilateral).    Medications:    has a current medication list which includes the following prescription(s): albuterol, albuterol, albuterol, albuterol, budesonide-formoterol 160-4.5 mcg, compressor, for nebulizer, cyclobenzaprine, dextroamphetamine-amphetamine, epinephrine, fluticasone propionate, fluticasone propionate, fluticasone propionate, levocetirizine, mometasone-formoterol, montelukast, mupirocin, proair hfa, promethazine-dextromethorphan, and singulair, and the following Facility-Administered Medications: acetaminophen, albuterol, diphenhydramine, epinephrine, methylprednisolone sodium succinate, ondansetron, sodium chloride 0.9% 500 mL flush bag, and sodium chloride 0.9%.    Allergies:   Review of patient's allergies indicates:   Allergen Reactions    Shellfish containing products           Objective       Posture Alignment: slouched posture;forward head;increased kyphosis;decreased lordosis;rounded shoulders    Sensation: Light touch: Intact    DTR: 1+ biceps, triceps, brachoradialis     GAIT DEVIATIONS:  displays no gait deviations    Cervical Range of Motion:    Degrees Pain   Flexion 39 +     Extension 46 -     Right Side Bending 32 -     Left Side Bending 30 -     Right Rotation 48 -   Left Rotation 47 -      Shoulder Range of Motion:   Shoulder Left Right   Flexion WFL WFL   Abduction WFL WFL   ER WFL WFL   IR WFL WFL     Strength:  Cervical MMT   Flexion 4/5   Extension 4-/5   Right Side Bend 4/5   Left Side Bend 4/5     Upper Extremity Strength  (R) UE  (L) UE    Shoulder elevation: 5/5 Shoulder elevation: 5/5   Shoulder flexion: 5/5 Shoulder flexion: 5/5   Shoulder Abduction: 5/5 Shoulder abduction: 5/5   Shoulder ER 5/5 Shoulder ER 5/5   Shoulder IR 5/5 Shoulder IR 5/5   Elbow flexion: 5/5 Elbow flexion: 5/5   Elbow extension: 5/5 Elbow extension: 5/5   Wrist flexion: 5/5 Wrist flexion: 5/5   Wrist extension: 5/5 Wrist extension: 5/5    5/5 : 5/5   Lower Trap 4-/5  "Lower Trap 4-/5   Middle Trap 4/5 Middle Trap 4/5   Rhomboids 4/5 Rhomboids 4/5       Special Tests:  Distraction +   Compression -   Spurlings -   Sharp-Kira -   VA test -   Lateral Flexion Alar Ligament -   DNF test -     Upper Limb Neurodynamic testing:   Right Left   UNT - -   MNT - -   RNT - -       Joint Mobility: hypomobility of cervical spine with CPA's and UPA's    Thoracic mobility: hypomobile    Palpation: tender to palpation of suboccipitals and bilateral upper trapezius      Flexibility: decreased soft tissue flexibility and mobility    PT Evaluation Completed? Yes  Discussed Plan of Care with patient: Yes    CMS Impairment/Limitation/Restriction for FOTO neck Survey    Therapist reviewed FOTO scores for Baron CALEB Shannon on 1/13/2022.   FOTO documents entered into [employer] - see Media section.    Limitation Score: 42%    Goal: 27%         TREATMENT   Treatment Time In: 3:30  Treatment Time Out: 3:55  Total Treatment time separate from Evaluation: 25 minutes     received therapeutic exercises to develop strength, endurance, ROM, flexibility and posture for 15 minutes including:  Supine chin tucks 2x10x3"  Seated oneil tucks 10x3"  UT stretch 2x30"  LS stretch 2x30"  Rows GTB 2x10x3"  No money GTB 15x3"  Thoracic ext over FR 10x5"  Corner pec stretch 2x30"  SOC 10x5"  Medex cervical extension 132# x15 (warm up at 90# x5 reps)    Next visit:  Cervical retraction + extension w/ towel  Rotation stretch w/ towel     received the following manual therapy techniques: Manual traction, Myofacial release and Soft tissue Mobilization were applied to the: cervical spine for 10 minutes, including:  Suboccipital release  Manual traction  UT and LS PROM stretching      received cold pack for 5 minutes to cervical spine.      Home Exercises and Patient Education Provided    Education provided:   - HEP, POC  - ergonomics and best posture when sitting and working    Written Home Exercises Provided: " yes.  Exercises were reviewed and  was able to demonstrate them prior to the end of the session.   demonstrated good  understanding of the education provided.     See EMR under Patient Instructions for exercises provided 1/13/2022.    Assessment    is a 26 y.o. male referred to outpatient Physical Therapy with a medical diagnosis of M50.30 (ICD-10-CM) - DDD (degenerative disc disease), cervical. Pt presents with signs and symptoms consistent with cervicogenic headaches including decreased range of motion of cervical spine, weakness of trunk musuculature, poor posture, periscapular weakness, decreased joint mobility, and decreased soft tissue flexibility and mobility. These deficits are limiting patient in full participation in ADL's and leisure activities such as looking down, bright light, looking up, sleeping, working at a desk, and driving. Pt reporting significant relief with suboccipital release. Educated on importance of posture when working and getting up every hour. Significant weakness for age/weight with medx cervical extension, progress according to protocol. Pt will benefit from skilled outpatient Physical Therapy to address the deficits stated above and in the chart below, provide pt/family education, and to maximize pt's level of independence.       Pt prognosis is Good.     Plan of care discussed with patient: Yes  Pt's spiritual, cultural and educational needs considered and patient is agreeable to the plan of care and goals as stated below:     Anticipated Barriers for therapy: none    Medical Necessity is demonstrated by the following  History  Co-morbidities and personal factors that may impact the plan of care Co-morbidities:   COPD/asthma, difficulty sleeping and ADHD    Personal Factors:   no deficits     low   Examination  Body Structures and Functions, activity limitations and participation restrictions that may impact the plan of care Body Regions:   neck  back  upper  extremities    Body Systems:    ROM  strength  gross coordinated movement  motor control    Participation Restrictions:   See above    Activity limitations:   Learning and applying knowledge  no deficits    General Tasks and Commands  no deficits    Communication  no deficits    Mobility  no deficits    Self care  no deficits    Domestic Life  no deficits    Interactions/Relationships  no deficits    Life Areas  no deficits    Community and Social Life  no deficits         Complexity: low   Clinical Presentation stable and uncomplicated low   Decision Making/ Complexity Score: low       GOALS: Short Term Goals: 4 weeks  1.Report decreased in pain at worse less than  <   / =  5  /10  to increase tolerance for functional activities. On going  2. Pt to improve range of motion of cervical by 25% to allow for improved functional mobility to allow for improvement in IADLs.  On going  3. Increased cervical MMT 1/2  grade to increase tolerance for ADL and work activities. On going  4. Pt to report no more than one headache a week so that he may increase tolerance for ADL. On going  5. Pt to tolerate HEP to improve ROM and independence with ADL's. On going  6. Increased MMT  for lower traps/middle traps/rhomboids to > or = 4+/5 to increase tolerance for ADL and improve posture. On going    Long Term Goals: 8 weeks  1.Report decreased in pain at worse less than  <   / =  3  /10  to increase tolerance for functional activities. On going  2.Pt to improve range of motion by 75% to allow for improved functional mobility to allow for improvement in IADLs. On going  3.Increased cervical MMT  1 grade  to increase tolerance for ADL and work activities.On going  4.  Pt will report 27% or less on FOTO neck survey score for neck pain disability to demonstrate decrease in disability and improvement in neck pain.On going  5. Pt to be Independent with HEP to improve ROM and independence with ADL's. On going  6. Increased MMT  for lower  traps/middle traps/rhomboids to > or = 5/5 to increase tolerance for ADL and improve posture. On going        Plan   Plan of care Certification: 1/13/2022 to 3/13/2022.    Outpatient Physical Therapy 1 times weekly for 8 weeks to include the following interventions: Cervical/Lumbar Traction, Manual Therapy, Moist Heat/ Ice, Neuromuscular Re-ed, Patient Education, Therapeutic Activities and Therapeutic Exercise. Dry needling    Darin Olivarez, RYAN      I CERTIFY THE NEED FOR THESE SERVICES FURNISHED UNDER THIS PLAN OF TREATMENT AND WHILE UNDER MY CARE   Physician's comments:     Physician's Signature: ___________________________________________________

## 2022-01-24 ENCOUNTER — CLINICAL SUPPORT (OUTPATIENT)
Dept: REHABILITATION | Facility: OTHER | Age: 27
End: 2022-01-24
Attending: ORTHOPAEDIC SURGERY
Payer: COMMERCIAL

## 2022-01-24 DIAGNOSIS — R29.3 POOR POSTURE: ICD-10-CM

## 2022-01-24 DIAGNOSIS — R29.898 DECREASED RANGE OF MOTION OF NECK: Primary | ICD-10-CM

## 2022-01-24 DIAGNOSIS — M53.82 NECK MUSCLE WEAKNESS: ICD-10-CM

## 2022-01-24 PROCEDURE — 97140 MANUAL THERAPY 1/> REGIONS: CPT | Mod: CQ,96

## 2022-01-24 PROCEDURE — 97110 THERAPEUTIC EXERCISES: CPT | Mod: CQ,96

## 2022-01-24 NOTE — PROGRESS NOTES
"Physical Therapy Daily Treatment Note     Name: Baron CALEB Shannon  Clinic Number: [id number]    Therapy Diagnosis:   Encounter Diagnoses   Name Primary?    Decreased range of motion of neck Yes    Neck muscle weakness     Poor posture      Physician: Hola Terry MD    Visit Date: 1/24/2022    Physician Orders: PT Eval and Treat   Medical Diagnosis from Referral: M50.30 (ICD-10-CM) - DDD (degenerative disc disease), cervical  Evaluation Date: 1/13/2022  Authorization Period Expiration: 12/31/2022  Plan of Care Expiration: 3/13/2022  Visit # / Visits authorized: 2/ 20  Progress Note Due: 2/13/2022  FOTO: 1/ 1     Precautions: Standard     Time In: 3:05 pm  Time Out: 4:10 pm  Total Appointment Time (timed & untimed codes): 55 minutes       CMS Impairment/Limitation/Restriction for FOTO neck Survey     Therapist reviewed FOTO scores for Baron CALEB Shannon on 1/13/2022.   FOTO documents entered into [employer] - see Media section.     Limitation Score: 42%     Goal: 27%          Subjective     Pt reports: some chronic (B) neck pain and stiffness along with HA symptoms which typically get worse at the end of the day. He states that he is trying to be more aware of his posture and has been working more at his office vs remotely from home. States that his office workspace is better from an ergonomic standpoint.  He was compliant with home exercise program.  Response to previous treatment: Mild soreness  Functional change: None yet    Pain: 3/10  Location: bilateral cervical paraspinals, ELLIS    Objective      received therapeutic exercises to develop strength, endurance, ROM, flexibility, posture and core stabilization for 45 minutes including:    UT stretch 3 x 20 sec  LS stretch 3 x 20 sec  +Cervical Retract + extension w/towel x 10 5"  +cervical Rotational stretch w/towel x 10 5"  Seated chin tucks 10x3"  Supine chin tucks 2x10x3"   Rows GTB 2x10x3"  No money GTB 2 x 10 x3"  Thoracic ext over FR 10x5"  Corner pec stretch " "2x30"  SOC  10x5"  Medex cervical extension 132# x15 (warm up at 90# x5 reps)     received the following manual therapy techniques: Manual traction, Myofacial release and Soft tissue Mobilization were applied to the: cervical spine for 10 minutes, including:  Suboccipital release  STM Cervical paraspinals/UT  Manual traction-NP  UT and LS PROM stretching      received cold pack for 5 minutes to cervical area     Home Exercises Provided and Patient Education Provided     Education provided:   - cues with ex's  - Postural awareness    Written Home Exercises Provided: Patient instructed to cont prior HEP. Added Cervical retract with Extension with a towel, Cervical rotational assist with towel 1/24/22   Exercises were reviewed and  was able to demonstrate them prior to the end of the session.   demonstrated good  understanding of the education provided.     See EMR under Patient Instructions for exercises provided prior visit and today's visit.    Assessment    returned for his first follow-up visit after his evaluation. He reports some continued symptoms of cervical discomfort, stiffness and HA which are worsened at the end of the day. He was able to perform the above ex's/activities without increased pain symptoms. Minimal cues provided for postural awareness and correct ex performance. After a 1 minute warm-up, he was able to perform the Cervical MedX machine with 132 in/lbs as performed on eval x 15 reps with a RPE = 3-4/10.  Patient noting some relief with manual techniques. Will continue to monitor and attempt to progress as tolerated..      Is progressing well towards his goals.   Pt prognosis is Good.     Pt will continue to benefit from skilled outpatient physical therapy to address the deficits listed in the problem list box on initial evaluation, provide pt/family education and to maximize pt's level of independence in the home and community environment.     Pt's spiritual, " cultural and educational needs considered and pt agreeable to plan of care and goals.     Anticipated barriers to physical therapy: None    GOALS: Short Term Goals: 4 weeks  1.Report decreased in pain at worse less than  <   / =  5  /10  to increase tolerance for functional activities. On going  2. Pt to improve range of motion of cervical by 25% to allow for improved functional mobility to allow for improvement in IADLs.  On going  3. Increased cervical MMT 1/2  grade to increase tolerance for ADL and work activities. On going  4. Pt to report no more than one headache a week so that he may increase tolerance for ADL. On going  5. Pt to tolerate HEP to improve ROM and independence with ADL's. On going  6. Increased MMT  for lower traps/middle traps/rhomboids to > or = 4+/5 to increase tolerance for ADL and improve posture. On going     Long Term Goals: 8 weeks  1.Report decreased in pain at worse less than  <   / =  3  /10  to increase tolerance for functional activities. On going  2.Pt to improve range of motion by 75% to allow for improved functional mobility to allow for improvement in IADLs. On going  3.Increased cervical MMT  1 grade  to increase tolerance for ADL and work activities.On going  4.  Pt will report 27% or less on FOTO neck survey score for neck pain disability to demonstrate decrease in disability and improvement in neck pain.On going  5. Pt to be Independent with HEP to improve ROM and independence with ADL's. On going  6. Increased MMT  for lower traps/middle traps/rhomboids to > or = 5/5 to increase tolerance for ADL and improve posture. On going       Plan     Plan of care Certification: 1/13/2022 to 3/13/2022.     Outpatient Physical Therapy 1 times weekly for 8 weeks to include the following interventions: Cervical/Lumbar Traction, Manual Therapy, Moist Heat/ Ice, Neuromuscular Re-ed, Patient Education, Therapeutic Activities and Therapeutic Exercise. Dalia David, PTA

## 2022-02-01 ENCOUNTER — CLINICAL SUPPORT (OUTPATIENT)
Dept: REHABILITATION | Facility: OTHER | Age: 27
End: 2022-02-01
Attending: ORTHOPAEDIC SURGERY
Payer: COMMERCIAL

## 2022-02-01 DIAGNOSIS — M53.82 NECK MUSCLE WEAKNESS: ICD-10-CM

## 2022-02-01 DIAGNOSIS — R29.3 POOR POSTURE: ICD-10-CM

## 2022-02-01 DIAGNOSIS — R29.898 DECREASED RANGE OF MOTION OF NECK: Primary | ICD-10-CM

## 2022-02-01 PROCEDURE — 97140 MANUAL THERAPY 1/> REGIONS: CPT

## 2022-02-01 PROCEDURE — 97110 THERAPEUTIC EXERCISES: CPT | Mod: 97

## 2022-02-01 NOTE — PROGRESS NOTES
"Physical Therapy Daily Treatment Note     Name: Baron CALEB Shannon  Clinic Number: [id number]    Therapy Diagnosis:   Encounter Diagnoses   Name Primary?    Decreased range of motion of neck Yes    Neck muscle weakness     Poor posture      Physician: Hola Terry MD    Visit Date: 2/1/2022    Physician Orders: PT Eval and Treat   Medical Diagnosis from Referral: M50.30 (ICD-10-CM) - DDD (degenerative disc disease), cervical  Evaluation Date: 1/13/2022  Authorization Period Expiration: 12/31/2022  Plan of Care Expiration: 3/13/2022  Visit # / Visits authorized: 3/ 20  Progress Note Due: 2/13/2022  FOTO: 1/ 1     Precautions: Standard     Time In: 3:05 pm  Time Out: 4:10 pm  Total Appointment Time (timed & untimed codes): 55 minutes       CMS Impairment/Limitation/Restriction for FOTO neck Survey     Therapist reviewed FOTO scores for Baron CALEB Shannon on 1/13/2022.   FOTO documents entered into [employer] - see Media section.     Limitation Score: 42%     Goal: 27%          Subjective     Pt reports: his headaches have been less frequent and less intense, only a couple times a week since starting therapy. No pain or headache right now    He was compliant with home exercise program.  Response to previous treatment: Mild soreness  Functional change: fewer and less intense headaches    Pain: 0/10  Location: bilateral cervical paraspinals, ELLIS    Objective      received therapeutic exercises to develop strength, endurance, ROM, flexibility, posture and core stabilization for 40 minutes including:    UT stretch 3 x 20 sec  LS stretch 3 x 20 sec  Cervical Retract + extension w/towel x 10 5"  cervical Rotational stretch w/towel x 10 5"  Seated chin tucks 10x3"  Supine chin tucks 2x10x3"   Rows 90# 2x10x3"  No money GTB 2 x 10 x3"  Thoracic ext over FR 10x5"  Corner pec stretch 2x30"  SOC  10x5"  Medex cervical extension 132# x20 (warm up at 90# x5 reps) (Seat pos. 202)(150# w/15 reps NV)  + prone over PB:   W's 2# " 10x3   Y's, T,s I,s 2# x3     received the following manual therapy techniques: Manual traction, Myofacial release and Soft tissue Mobilization were applied to the: cervical spine for 15 minutes, including:  Suboccipital release  STM Cervical paraspinals/UT  Manual traction-NP  UT and LS PROM stretching      received cold pack for 5 minutes to cervical area     Home Exercises Provided and Patient Education Provided     Education provided:   - cues with ex's  - Postural awareness    Written Home Exercises Provided: Patient instructed to cont prior HEP. Added Cervical retract with Extension with a towel, Cervical rotational assist with towel 1/24/22   Exercises were reviewed and  was able to demonstrate them prior to the end of the session.   demonstrated good  understanding of the education provided.     See EMR under Patient Instructions for exercises provided prior visit and today's visit.    Assessment    presents today without complaints of pain. Pt reporting decreased frequency and intensity of headaches the last couple of weeks since starting therapy. Continuation and progression of dynamic cervical and periscapular strengthening and cervical motor control exercises without adverse effects. Pt still with min ROM with cervical retractions indicating continued poor motor control, however somewhat improved with cueing. Increase resistance with cervical extension strengthening next visit. Continue to progress as tolerated.      Is progressing well towards his goals.   Pt prognosis is Good.     Pt will continue to benefit from skilled outpatient physical therapy to address the deficits listed in the problem list box on initial evaluation, provide pt/family education and to maximize pt's level of independence in the home and community environment.     Pt's spiritual, cultural and educational needs considered and pt agreeable to plan of care and goals.     Anticipated barriers to physical  therapy: None    GOALS: Short Term Goals: 4 weeks  1.Report decreased in pain at worse less than  <   / =  5  /10  to increase tolerance for functional activities. On going  2. Pt to improve range of motion of cervical by 25% to allow for improved functional mobility to allow for improvement in IADLs.  On going  3. Increased cervical MMT 1/2  grade to increase tolerance for ADL and work activities. On going  4. Pt to report no more than one headache a week so that he may increase tolerance for ADL. On going  5. Pt to tolerate HEP to improve ROM and independence with ADL's. On going  6. Increased MMT  for lower traps/middle traps/rhomboids to > or = 4+/5 to increase tolerance for ADL and improve posture. On going     Long Term Goals: 8 weeks  1.Report decreased in pain at worse less than  <   / =  3  /10  to increase tolerance for functional activities. On going  2.Pt to improve range of motion by 75% to allow for improved functional mobility to allow for improvement in IADLs. On going  3.Increased cervical MMT  1 grade  to increase tolerance for ADL and work activities.On going  4.  Pt will report 27% or less on FOTO neck survey score for neck pain disability to demonstrate decrease in disability and improvement in neck pain.On going  5. Pt to be Independent with HEP to improve ROM and independence with ADL's. On going  6. Increased MMT  for lower traps/middle traps/rhomboids to > or = 5/5 to increase tolerance for ADL and improve posture. On going       Plan     Plan of care Certification: 1/13/2022 to 3/13/2022.     Outpatient Physical Therapy 1 times weekly for 8 weeks to include the following interventions: Cervical/Lumbar Traction, Manual Therapy, Moist Heat/ Ice, Neuromuscular Re-ed, Patient Education, Therapeutic Activities and Therapeutic Exercise. Dry needling    Darin Olivarez, PT

## 2022-02-08 ENCOUNTER — CLINICAL SUPPORT (OUTPATIENT)
Dept: REHABILITATION | Facility: OTHER | Age: 27
End: 2022-02-08
Attending: ORTHOPAEDIC SURGERY
Payer: COMMERCIAL

## 2022-02-08 DIAGNOSIS — R29.3 POOR POSTURE: ICD-10-CM

## 2022-02-08 DIAGNOSIS — M53.82 NECK MUSCLE WEAKNESS: ICD-10-CM

## 2022-02-08 DIAGNOSIS — R29.898 DECREASED RANGE OF MOTION OF NECK: Primary | ICD-10-CM

## 2022-02-08 PROCEDURE — 97110 THERAPEUTIC EXERCISES: CPT | Mod: 97

## 2022-02-08 PROCEDURE — 97140 MANUAL THERAPY 1/> REGIONS: CPT | Mod: 97

## 2022-02-08 NOTE — PROGRESS NOTES
Physical Therapy Daily Treatment Note     Name: Baron CALEB Shannon  Clinic Number: [id number]    Therapy Diagnosis:   Encounter Diagnoses   Name Primary?    Decreased range of motion of neck Yes    Neck muscle weakness     Poor posture      Physician: Hola Terry MD    Visit Date: 2/8/2022    Physician Orders: PT Eval and Treat   Medical Diagnosis from Referral: M50.30 (ICD-10-CM) - DDD (degenerative disc disease), cervical  Evaluation Date: 1/13/2022  Authorization Period Expiration: 12/31/2022  Plan of Care Expiration: 3/13/2022  Visit # / Visits authorized: 4/ 20  Progress Note Due: 3/8/2022  FOTO: 1/ 1  FOTO NEXT VISIT     Precautions: Standard     Time In: 3:00 pm  Time Out: 4:05 pm  Total Appointment Time (timed & untimed codes): 55 minutes       CMS Impairment/Limitation/Restriction for FOTO neck Survey     Therapist reviewed FOTO scores for Baron CALEB Shannon on 1/13/2022.   FOTO documents entered into [employer] - see Media section.     Limitation Score: 42%     Goal: 27%          Subjective     Pt reports: continues to see improvement in intensity and frequency of headaches. He has been experiencing insomnia the last week, unsure why.    He was compliant with home exercise program.  Response to previous treatment: Mild soreness  Functional change: fewer and less intense headaches    Pain: 0/10  Location: bilateral cervical paraspinals, HA    Objective     Cervical Range of Motion:     Degrees Pain   Flexion 43 -      Extension 66 -      Right Side Bending 44 -      Left Side Bending 44 -      Right Rotation 68 -   Left Rotation 64 -     Upper Extremity Strength  (R) UE   (L) UE     Shoulder elevation: 5/5 Shoulder elevation: 5/5   Shoulder flexion: 5/5 Shoulder flexion: 5/5   Shoulder Abduction: 5/5 Shoulder abduction: 5/5   Shoulder ER 5/5 Shoulder ER 5/5   Shoulder IR 5/5 Shoulder IR 5/5   Elbow flexion: 5/5 Elbow flexion: 5/5   Elbow extension: 5/5 Elbow extension: 5/5   Wrist flexion: 5/5 Wrist flexion: 5/5  "  Wrist extension: 5/5 Wrist extension: 5/5    5/5 : 5/5   Lower Trap 4/5 Lower Trap 4/5   Middle Trap 4+/5 Middle Trap 4+/5   Rhomboids 4+/5 Rhomboids 4+/5      Strength:  Cervical MMT   Flexion 4+/5   Extension 4+/5   Right Side Bend 4+/5   Left Side Bend 4+/5           received therapeutic exercises to develop strength, endurance, ROM, flexibility, posture and core stabilization for 40 minutes including:    UT stretch 3 x 20 sec  LS stretch 3 x 20 sec  Cervical Retract + extension w/towel x 10 5"  Seated chin tucks 10x3"  +Seated chin tuck w/ mini rotation x10  Supine chin tucks 2x10x3"   +scalene stretch 5x10" ea  Rows 90# 2x10x3" - NP  SOC + No money GTB 15x3"  Thoracic ext over FR 10x5"  Cat/cow x10   Corner pec stretch 2x30"  Medex cervical extension 150# x15 (warm up at 90# x5 reps)(RPE 3/10)(Seat pos. 202)(18-20 reps NV)  +snow angels RTB x10  +serratus wall slides + lift off RTB 10x3"  prone over PB:   W's 2# 10x3   Y's, T,s I,s 2# x3    NP:  cervical Rotational stretch w/towel x 10 5"     received the following manual therapy techniques: Manual traction, Myofacial release and Soft tissue Mobilization were applied to the: cervical spine for 15 minutes, including:  Suboccipital release  STM Cervical paraspinals/UT  Manual traction  UT and LS PROM stretching      received cold pack for 5 minutes to cervical area     Home Exercises Provided and Patient Education Provided     Education provided:   - cues with ex's  - Postural awareness    Written Home Exercises Provided: Patient instructed to cont prior HEP. Added Cervical retract with Extension with a towel, Cervical rotational assist with towel 1/24/22   Exercises were reviewed and  was able to demonstrate them prior to the end of the session.   demonstrated good  understanding of the education provided.     See EMR under Patient Instructions for exercises provided prior visit and today's visit.    Assessment    presents " today with continued reports of decreased intensity and frequency of headaches. Reassessment performed with pt demo gains in cervical strength and ROM, as well as periscapular strength. Still with some strength deficits in lower/middle traps and rhomboids, but 5/5 MMT in UE's otherwise. Pt demo improved postural awareness and improved resting posture. Improved motor control with chin tucks with increased reps. Continuation of dynamic cervical and periscapular strengthening and flexibility exercises with good tolerance and no adverse effects. Increased medex weight with appropriate reported RPE after completion. Increase reps next visit. Continue to progress as tolerated.     Is progressing well towards his goals.   Pt prognosis is Good.     Pt will continue to benefit from skilled outpatient physical therapy to address the deficits listed in the problem list box on initial evaluation, provide pt/family education and to maximize pt's level of independence in the home and community environment.     Pt's spiritual, cultural and educational needs considered and pt agreeable to plan of care and goals.     Anticipated barriers to physical therapy: None    GOALS: Short Term Goals: 4 weeks  1.Report decreased in pain at worse less than  <   / =  5  /10  to increase tolerance for functional activities. On going  2. Pt to improve range of motion of cervical by 25% to allow for improved functional mobility to allow for improvement in IADLs.  On going  3. Increased cervical MMT 1/2  grade to increase tolerance for ADL and work activities. On going  4. Pt to report no more than one headache a week so that he may increase tolerance for ADL. On going  5. Pt to tolerate HEP to improve ROM and independence with ADL's. On going  6. Increased MMT  for lower traps/middle traps/rhomboids to > or = 4+/5 to increase tolerance for ADL and improve posture. On going     Long Term Goals: 8 weeks  1.Report decreased in pain at worse less  than  <   / =  3  /10  to increase tolerance for functional activities. On going  2.Pt to improve range of motion by 75% to allow for improved functional mobility to allow for improvement in IADLs. On going  3.Increased cervical MMT  1 grade  to increase tolerance for ADL and work activities.On going  4.  Pt will report 27% or less on FOTO neck survey score for neck pain disability to demonstrate decrease in disability and improvement in neck pain.On going  5. Pt to be Independent with HEP to improve ROM and independence with ADL's. On going  6. Increased MMT  for lower traps/middle traps/rhomboids to > or = 5/5 to increase tolerance for ADL and improve posture. On going       Plan     Plan of care Certification: 1/13/2022 to 3/13/2022.     Outpatient Physical Therapy 1 times weekly for 8 weeks to include the following interventions: Cervical/Lumbar Traction, Manual Therapy, Moist Heat/ Ice, Neuromuscular Re-ed, Patient Education, Therapeutic Activities and Therapeutic Exercise. Dry needling    Darin Olivarez, PT

## 2022-02-15 ENCOUNTER — CLINICAL SUPPORT (OUTPATIENT)
Dept: REHABILITATION | Facility: OTHER | Age: 27
End: 2022-02-15
Attending: ORTHOPAEDIC SURGERY
Payer: COMMERCIAL

## 2022-02-15 DIAGNOSIS — R29.898 DECREASED RANGE OF MOTION OF NECK: Primary | ICD-10-CM

## 2022-02-15 DIAGNOSIS — R29.3 POOR POSTURE: ICD-10-CM

## 2022-02-15 DIAGNOSIS — M53.82 NECK MUSCLE WEAKNESS: ICD-10-CM

## 2022-02-15 PROCEDURE — 97110 THERAPEUTIC EXERCISES: CPT | Mod: 97

## 2022-02-15 PROCEDURE — 97140 MANUAL THERAPY 1/> REGIONS: CPT

## 2022-02-15 NOTE — PROGRESS NOTES
Physical Therapy Daily Treatment Note     Name: Baron CALEB Shannon  [facility] Number: [id number]    Therapy Diagnosis:   Encounter Diagnoses   Name Primary?    Decreased range of motion of neck Yes    Neck muscle weakness     Poor posture      Physician: Hola Terry MD    Visit Date: 2/15/2022    Physician Orders: PT Eval and Treat   Medical Diagnosis from Referral: M50.30 (ICD-10-CM) - DDD (degenerative disc disease), cervical  Evaluation Date: 1/13/2022  Authorization Period Expiration: 12/31/2022  Plan of Care Expiration: 3/13/2022  Visit # / Visits authorized: 5/ 20  Progress Note Due: 3/8/2022  FOTO: 1/ 1  FOTO NEXT VISIT     Precautions: Standard     Time In: 3:00 pm  Time Out: 4:05 pm  Total Appointment Time (timed & untimed codes): 55 minutes           Subjective     Pt reports: he strained his neck the other day while doing jiu jitsu. It feels fine now    He was compliant with home exercise program.  Response to previous treatment: Mild soreness  Functional change: fewer and less intense headaches    Pain: 0/10  Location: bilateral cervical paraspinals, HA    Objective     Cervical Range of Motion:     Degrees Pain   Flexion 43 -      Extension 66 -      Right Side Bending 44 -      Left Side Bending 44 -      Right Rotation 68 -   Left Rotation 64 -     Upper Extremity Strength  (R) UE   (L) UE     Shoulder elevation: 5/5 Shoulder elevation: 5/5   Shoulder flexion: 5/5 Shoulder flexion: 5/5   Shoulder Abduction: 5/5 Shoulder abduction: 5/5   Shoulder ER 5/5 Shoulder ER 5/5   Shoulder IR 5/5 Shoulder IR 5/5   Elbow flexion: 5/5 Elbow flexion: 5/5   Elbow extension: 5/5 Elbow extension: 5/5   Wrist flexion: 5/5 Wrist flexion: 5/5   Wrist extension: 5/5 Wrist extension: 5/5    5/5 : 5/5   Lower Trap 4/5 Lower Trap 4/5   Middle Trap 4+/5 Middle Trap 4+/5   Rhomboids 4+/5 Rhomboids 4+/5      Strength:  Cervical MMT   Flexion 4+/5   Extension 4+/5   Right Side Bend 4+/5   Left Side Bend 4+/5  "          received therapeutic exercises to develop strength, endurance, ROM, flexibility, posture and core stabilization for 40 minutes including:    UT stretch 3 x 20 sec  LS stretch 3 x 20 sec  Cervical Retract + extension w/towel x 10 5"  Seated chin tucks 10x3"  Seated chin tuck w/ mini rotation x10  Supine chin tucks 2x10x3"   scalene stretch 5x10" ea  Rows 90# 2x10x3" - NP  SOC + No money GTB 15x3"  Thoracic ext over FR 10x5"  Cat/cow x10   Corner pec stretch 2x30"  Medex cervical extension 150# x15 (warm up at 90# x5 reps)(RPE 3/10)(Seat pos. 202)(18-20 reps NV)  snow angels RTB x10  serratus wall slides + lift off RTB 10x3"  prone over PB:   W's 2# 10x3   Y's, T,s I,s 2# x3    NP:  cervical Rotational stretch w/towel x 10 5"     received the following manual therapy techniques: FDN, Myofacial release and Soft tissue Mobilization were applied to the: cervical spine for 15 minutes, including:  Suboccipital release  FDN: pt received trigger point dry needling of cervical paraspinals today. (1) needle was inserted on each side from C4-C5. And (2) needles were inserted at C3 for a total of (8) needles.      received cold pack for 0 minutes to cervical area     Home Exercises Provided and Patient Education Provided     Education provided:   - cues with ex's  - Postural awareness    Written Home Exercises Provided: Patient instructed to cont prior HEP. Added Cervical retract with Extension with a towel, Cervical rotational assist with towel 1/24/22   Exercises were reviewed and  was able to demonstrate them prior to the end of the session.   demonstrated good  understanding of the education provided.     See EMR under Patient Instructions for exercises provided prior visit and today's visit.    Assessment    presents today without complaints of pain. Pt received dry needling of cervical paraspinals today from C3-C5, pt reported reproduction of headache symptoms with C3 needles, but " symptoms subsided after a few minutes. Pt was monitored for adverse effects and none were noted. Pt able to complete all therex with good tolerance and min pain aggravation after needling. Assess effectiveness next visit. Pt demo improved FOTO score today.        Is progressing well towards his goals.   Pt prognosis is Good.     Pt will continue to benefit from skilled outpatient physical therapy to address the deficits listed in the problem list box on initial evaluation, provide pt/family education and to maximize pt's level of independence in the home and community environment.     Pt's spiritual, cultural and educational needs considered and pt agreeable to plan of care and goals.     Anticipated barriers to physical therapy: None    GOALS: Short Term Goals: 4 weeks  1.Report decreased in pain at worse less than  <   / =  5  /10  to increase tolerance for functional activities. On going  2. Pt to improve range of motion of cervical by 25% to allow for improved functional mobility to allow for improvement in IADLs.  MET  3. Increased cervical MMT 1/2  grade to increase tolerance for ADL and work activities. MET  4. Pt to report no more than one headache a week so that he may increase tolerance for ADL. On going  5. Pt to tolerate HEP to improve ROM and independence with ADL's. MET  6. Increased MMT  for lower traps/middle traps/rhomboids to > or = 4+/5 to increase tolerance for ADL and improve posture. On going     Long Term Goals: 8 weeks  1.Report decreased in pain at worse less than  <   / =  3  /10  to increase tolerance for functional activities. On going  2.Pt to improve range of motion by 75% to allow for improved functional mobility to allow for improvement in IADLs. On going  3.Increased cervical MMT  1 grade  to increase tolerance for ADL and work activities.On going  4.  Pt will report 27% or less on FOTO neck survey score for neck pain disability to demonstrate decrease in disability and  improvement in neck pain.On going  5. Pt to be Independent with HEP to improve ROM and independence with ADL's. On going  6. Increased MMT  for lower traps/middle traps/rhomboids to > or = 5/5 to increase tolerance for ADL and improve posture. On going       Plan     Plan of care Certification: 1/13/2022 to 3/13/2022.     Outpatient Physical Therapy 1 times weekly for 8 weeks to include the following interventions: Cervical/Lumbar Traction, Manual Therapy, Moist Heat/ Ice, Neuromuscular Re-ed, Patient Education, Therapeutic Activities and Therapeutic Exercise. Dry needling    Darin Olivarez, PT

## 2022-02-22 ENCOUNTER — CLINICAL SUPPORT (OUTPATIENT)
Dept: REHABILITATION | Facility: OTHER | Age: 27
End: 2022-02-22
Attending: ORTHOPAEDIC SURGERY
Payer: COMMERCIAL

## 2022-02-22 DIAGNOSIS — R29.3 POOR POSTURE: ICD-10-CM

## 2022-02-22 DIAGNOSIS — R29.898 DECREASED RANGE OF MOTION OF NECK: Primary | ICD-10-CM

## 2022-02-22 DIAGNOSIS — M53.82 NECK MUSCLE WEAKNESS: ICD-10-CM

## 2022-02-22 PROCEDURE — 97110 THERAPEUTIC EXERCISES: CPT

## 2022-02-22 PROCEDURE — 97140 MANUAL THERAPY 1/> REGIONS: CPT

## 2022-02-22 NOTE — PROGRESS NOTES
Physical Therapy Daily Treatment Note     Name: Baron CALEB Shannon  [facility] Number: [id number]    Therapy Diagnosis:   Encounter Diagnoses   Name Primary?    Decreased range of motion of neck Yes    Neck muscle weakness     Poor posture      Physician: Hola Terry MD    Visit Date: 2/22/2022    Physician Orders: PT Eval and Treat   Medical Diagnosis from Referral: M50.30 (ICD-10-CM) - DDD (degenerative disc disease), cervical  Evaluation Date: 1/13/2022  Authorization Period Expiration: 12/31/2022  Plan of Care Expiration: 3/13/2022  Visit # / Visits authorized: 6/ 20  Progress Note Due: 3/8/2022  FOTO: 1/ 1       Precautions: Standard     Time In: 3:00 pm  Time Out: 4:05 pm  Total Appointment Time (timed & untimed codes): 55 minutes      Subjective     Pt reports: he has not had any headaches since needling last visit. He is encouraged by his progress but he is still struggling with insomnia    He was compliant with home exercise program.  Response to previous treatment: Mild soreness  Functional change: fewer and less intense headaches    Pain: 0/10  Location: bilateral cervical paraspinals, HA    Objective       Cervical Range of Motion:     Degrees Pain   Flexion 43 -      Extension 66 -      Right Side Bending 44 -      Left Side Bending 44 -      Right Rotation 68 -   Left Rotation 64 -     Upper Extremity Strength  (R) UE   (L) UE     Shoulder elevation: 5/5 Shoulder elevation: 5/5   Shoulder flexion: 5/5 Shoulder flexion: 5/5   Shoulder Abduction: 5/5 Shoulder abduction: 5/5   Shoulder ER 5/5 Shoulder ER 5/5   Shoulder IR 5/5 Shoulder IR 5/5   Elbow flexion: 5/5 Elbow flexion: 5/5   Elbow extension: 5/5 Elbow extension: 5/5   Wrist flexion: 5/5 Wrist flexion: 5/5   Wrist extension: 5/5 Wrist extension: 5/5    5/5 : 5/5   Lower Trap 4/5 Lower Trap 4/5   Middle Trap 4+/5 Middle Trap 4+/5   Rhomboids 4+/5 Rhomboids 4+/5      Strength:  Cervical MMT   Flexion 4+/5   Extension 4+/5   Right Side Bend 4+/5  "  Left Side Bend 4+/5           received therapeutic exercises to develop strength, endurance, ROM, flexibility, posture and core stabilization for 45 minutes including:    UT stretch 3 x 20 sec  LS stretch 3 x 20 sec  Cervical Retract + extension w/towel x 10 5"  Seated chin tucks 10x3"  Seated chin tuck w/ mini rotation x10  Supine chin tucks 10x3"   scalene stretch 5x10" ea  Rows 90# 2x10x3" - NP  SOC + No money GTB 15x3"  Thoracic ext over FR 10x5"   Cat/cow x10   Corner pec stretch 2x30"  Medex cervical extension 150# x20 (warm up at 90# x5 reps)(RPE 3/10)(Seat pos. 202)(increase resistance 10% next visit)  Open books x10  snow angels RTB x10  serratus wall slides + lift off RTB 10x3"  prone over PB:   W's 2# 10x3   Y's, T,s I,s 2# x3    NP:  cervical Rotational stretch w/towel x 10 5"     received the following manual therapy techniques: FDN, Myofacial release and Soft tissue Mobilization were applied to the: cervical spine for 10 minutes, including:    Suboccipital release     received cold pack for 0 minutes to cervical area     Home Exercises Provided and Patient Education Provided     Education provided:   - cues with ex's  - Postural awareness    Written Home Exercises Provided: Patient instructed to cont prior HEP. Added Cervical retract with Extension with a towel, Cervical rotational assist with towel 1/24/22   Exercises were reviewed and  was able to demonstrate them prior to the end of the session.   demonstrated good  understanding of the education provided.     See EMR under Patient Instructions for exercises provided prior visit and today's visit.    Assessment    presents today without pain or headaches the last week. Continuation and progression of cervical strengthening, stabilization, and ROM exercises with good tolerance and no adverse effects. Pt reporting significant relief with scalene stretch today. Continue to progress as tolerated. Reassess next visit. "        Is progressing well towards his goals.   Pt prognosis is Good.     Pt will continue to benefit from skilled outpatient physical therapy to address the deficits listed in the problem list box on initial evaluation, provide pt/family education and to maximize pt's level of independence in the home and community environment.     Pt's spiritual, cultural and educational needs considered and pt agreeable to plan of care and goals.     Anticipated barriers to physical therapy: None    GOALS: Short Term Goals: 4 weeks  1.Report decreased in pain at worse less than  <   / =  5  /10  to increase tolerance for functional activities. On going  2. Pt to improve range of motion of cervical by 25% to allow for improved functional mobility to allow for improvement in IADLs.  MET  3. Increased cervical MMT 1/2  grade to increase tolerance for ADL and work activities. MET  4. Pt to report no more than one headache a week so that he may increase tolerance for ADL. On going  5. Pt to tolerate HEP to improve ROM and independence with ADL's. MET  6. Increased MMT  for lower traps/middle traps/rhomboids to > or = 4+/5 to increase tolerance for ADL and improve posture. On going     Long Term Goals: 8 weeks  1.Report decreased in pain at worse less than  <   / =  3  /10  to increase tolerance for functional activities. On going  2.Pt to improve range of motion by 75% to allow for improved functional mobility to allow for improvement in IADLs. On going  3.Increased cervical MMT  1 grade  to increase tolerance for ADL and work activities.On going  4.  Pt will report 27% or less on FOTO neck survey score for neck pain disability to demonstrate decrease in disability and improvement in neck pain.On going  5. Pt to be Independent with HEP to improve ROM and independence with ADL's. On going  6. Increased MMT  for lower traps/middle traps/rhomboids to > or = 5/5 to increase tolerance for ADL and improve posture. On going       Plan      Plan of care Certification: 1/13/2022 to 3/13/2022.     Outpatient Physical Therapy 1 times weekly for 8 weeks to include the following interventions: Cervical/Lumbar Traction, Manual Therapy, Moist Heat/ Ice, Neuromuscular Re-ed, Patient Education, Therapeutic Activities and Therapeutic Exercise. Dry needling    Darin Olivarez, PT

## 2022-02-24 ENCOUNTER — TELEPHONE (OUTPATIENT)
Dept: SLEEP MEDICINE | Facility: CLINIC | Age: 27
End: 2022-02-24
Payer: COMMERCIAL

## 2022-02-24 NOTE — TELEPHONE ENCOUNTER
Staff reached out to the pt's father (Brayan) and informed him that I have been trying to get in contact with the pt to inform him that Dr. Solano will not be in clinic and that we need to reschedule his appt for today. The pt's father stated that he will reached out to the pt and let him know.

## 2022-02-24 NOTE — TELEPHONE ENCOUNTER
Staff reached out to the pt and he did not answer. Pt did not have a vm for me to leave a message to inform him that Dr. Solano will not be physically be in the office but he is doing virtual visit. Staff wanted to see if he wanted to convert his appt to virtual or reschedule.

## 2022-02-28 ENCOUNTER — OFFICE VISIT (OUTPATIENT)
Dept: SLEEP MEDICINE | Facility: CLINIC | Age: 27
End: 2022-02-28
Payer: COMMERCIAL

## 2022-02-28 VITALS — SYSTOLIC BLOOD PRESSURE: 123 MMHG | HEART RATE: 90 BPM | DIASTOLIC BLOOD PRESSURE: 72 MMHG

## 2022-02-28 DIAGNOSIS — R06.83 SNORING: ICD-10-CM

## 2022-02-28 DIAGNOSIS — F51.09 OTHER INSOMNIA NOT DUE TO A SUBSTANCE OR KNOWN PHYSIOLOGICAL CONDITION: Primary | ICD-10-CM

## 2022-02-28 PROCEDURE — 99999 PR PBB SHADOW E&M-EST. PATIENT-LVL III: CPT | Mod: PBBFAC,,, | Performed by: INTERNAL MEDICINE

## 2022-02-28 PROCEDURE — 3078F DIAST BP <80 MM HG: CPT | Mod: CPTII,S$GLB,, | Performed by: INTERNAL MEDICINE

## 2022-02-28 PROCEDURE — 3078F PR MOST RECENT DIASTOLIC BLOOD PRESSURE < 80 MM HG: ICD-10-PCS | Mod: CPTII,S$GLB,, | Performed by: INTERNAL MEDICINE

## 2022-02-28 PROCEDURE — 99203 OFFICE O/P NEW LOW 30 MIN: CPT | Mod: S$GLB,,, | Performed by: INTERNAL MEDICINE

## 2022-02-28 PROCEDURE — 1159F PR MEDICATION LIST DOCUMENTED IN MEDICAL RECORD: ICD-10-PCS | Mod: CPTII,S$GLB,, | Performed by: INTERNAL MEDICINE

## 2022-02-28 PROCEDURE — 99203 PR OFFICE/OUTPT VISIT, NEW, LEVL III, 30-44 MIN: ICD-10-PCS | Mod: S$GLB,,, | Performed by: INTERNAL MEDICINE

## 2022-02-28 PROCEDURE — 99999 PR PBB SHADOW E&M-EST. PATIENT-LVL III: ICD-10-PCS | Mod: PBBFAC,,, | Performed by: INTERNAL MEDICINE

## 2022-02-28 PROCEDURE — 3074F PR MOST RECENT SYSTOLIC BLOOD PRESSURE < 130 MM HG: ICD-10-PCS | Mod: CPTII,S$GLB,, | Performed by: INTERNAL MEDICINE

## 2022-02-28 PROCEDURE — 1159F MED LIST DOCD IN RCRD: CPT | Mod: CPTII,S$GLB,, | Performed by: INTERNAL MEDICINE

## 2022-02-28 PROCEDURE — 3074F SYST BP LT 130 MM HG: CPT | Mod: CPTII,S$GLB,, | Performed by: INTERNAL MEDICINE

## 2022-02-28 RX ORDER — ZOLPIDEM TARTRATE 5 MG/1
5 TABLET ORAL NIGHTLY PRN
Qty: 14 TABLET | Refills: 0 | Status: SHIPPED | OUTPATIENT
Start: 2022-02-28 | End: 2022-08-29

## 2022-02-28 NOTE — PROGRESS NOTES
Referred by Self, Aaareferral     NEW PATIENT VISIT    Baron CALEB Shannon  is a pleasant 26 y.o. male  with PMH significant for chronic sinusitis, ashtma, ADHD who presents today with trouble falling asleep, in particular over the last 2 weeks.     SLEEP SCHEDULE (prior)   Bed Time 11:15 pm   Sleep Latency 15-30minutes   Arousals 2-3   Nocturia 0-1   Back to sleep 10-20min   Wake time 7 AM (refreshed)   Naps none   Work  8-4:30 PM, part-time 5-11Pm     Vitals:    02/28/22 1411   BP: 123/72   BP Location: Right arm   Patient Position: Sitting   BP Method: Medium (Automatic)   Pulse: 90     Physical Exam:    GEN:   Well-appearing  Psych:  Appropriate affect, demonstrates insight  SKIN:  No rash on the face or bridge of the nose    LABS:   No results found for: HGB, CO2    RECORDS REVIEWED PREVIOUSLY:    No prior sleep testing.    ASSESSMENT    No flowsheet data found.  PROBLEM DESCRIPTION/ Sx on Presentation  STATUS   Insomnia/ possible DSPS   Sleep onset  Over the past couple of weeks  Works until about 11pm, some nights can't get to sleep at all  Has had episodes in the past for 2-3 days  Hypnotics: has tried benadryl, nyquil    New   Screening for ALBIN   + snoring per GF (inconsistent), no snoring arousals  Brother has ALBIN on CPAP  HEENT: MP1, + tonsillar hypertrophy (grade 1)  New   Daytime Sx   denies sleepiness when inactive   denies sleepiness when driving   ESS 9/24 on intake  New   ADD   On adderall 15mg , takes sparingly,   Not using recently  New   Other issues:     PLAN     -discussed sleep hygiene, wind down plan  -discussed acute rx for ambien 5mg (will give 10 days supply)  -discussed screening for ALBIN but has few risk factors  -driving precautions were discussed with the patient    RTC          The patient was given open opportunity to ask questions and/or express concerns about treatment plan.   All questions/concerns were discussed.     Two patient identifiers used prior to evaluation.

## 2022-03-09 ENCOUNTER — TELEPHONE (OUTPATIENT)
Dept: URGENT CARE | Facility: CLINIC | Age: 27
End: 2022-03-09

## 2022-03-09 ENCOUNTER — OFFICE VISIT (OUTPATIENT)
Dept: URGENT CARE | Facility: CLINIC | Age: 27
End: 2022-03-09
Payer: COMMERCIAL

## 2022-03-09 VITALS
OXYGEN SATURATION: 97 % | RESPIRATION RATE: 18 BRPM | TEMPERATURE: 98 F | DIASTOLIC BLOOD PRESSURE: 73 MMHG | SYSTOLIC BLOOD PRESSURE: 107 MMHG | HEIGHT: 72 IN | HEART RATE: 71 BPM | WEIGHT: 205 LBS | BODY MASS INDEX: 27.77 KG/M2

## 2022-03-09 DIAGNOSIS — U07.1 COVID-19 VIRUS INFECTION: Primary | ICD-10-CM

## 2022-03-09 LAB
CTP QC/QA: YES
CTP QC/QA: YES
POC MOLECULAR INFLUENZA A AGN: NEGATIVE
POC MOLECULAR INFLUENZA B AGN: NEGATIVE
SARS-COV-2 RDRP RESP QL NAA+PROBE: POSITIVE

## 2022-03-09 PROCEDURE — 3078F PR MOST RECENT DIASTOLIC BLOOD PRESSURE < 80 MM HG: ICD-10-PCS | Mod: CPTII,S$GLB,, | Performed by: PHYSICIAN ASSISTANT

## 2022-03-09 PROCEDURE — 3008F PR BODY MASS INDEX (BMI) DOCUMENTED: ICD-10-PCS | Mod: CPTII,S$GLB,, | Performed by: PHYSICIAN ASSISTANT

## 2022-03-09 PROCEDURE — 87502 INFLUENZA DNA AMP PROBE: CPT | Mod: QW,S$GLB,, | Performed by: PHYSICIAN ASSISTANT

## 2022-03-09 PROCEDURE — 99213 OFFICE O/P EST LOW 20 MIN: CPT | Mod: S$GLB,,, | Performed by: PHYSICIAN ASSISTANT

## 2022-03-09 PROCEDURE — 3074F PR MOST RECENT SYSTOLIC BLOOD PRESSURE < 130 MM HG: ICD-10-PCS | Mod: CPTII,S$GLB,, | Performed by: PHYSICIAN ASSISTANT

## 2022-03-09 PROCEDURE — 1159F PR MEDICATION LIST DOCUMENTED IN MEDICAL RECORD: ICD-10-PCS | Mod: CPTII,S$GLB,, | Performed by: PHYSICIAN ASSISTANT

## 2022-03-09 PROCEDURE — U0002 COVID-19 LAB TEST NON-CDC: HCPCS | Mod: QW,S$GLB,, | Performed by: PHYSICIAN ASSISTANT

## 2022-03-09 PROCEDURE — U0002: ICD-10-PCS | Mod: QW,S$GLB,, | Performed by: PHYSICIAN ASSISTANT

## 2022-03-09 PROCEDURE — 1160F RVW MEDS BY RX/DR IN RCRD: CPT | Mod: CPTII,S$GLB,, | Performed by: PHYSICIAN ASSISTANT

## 2022-03-09 PROCEDURE — 3078F DIAST BP <80 MM HG: CPT | Mod: CPTII,S$GLB,, | Performed by: PHYSICIAN ASSISTANT

## 2022-03-09 PROCEDURE — 87502 POCT INFLUENZA A/B MOLECULAR: ICD-10-PCS | Mod: QW,S$GLB,, | Performed by: PHYSICIAN ASSISTANT

## 2022-03-09 PROCEDURE — 99213 PR OFFICE/OUTPT VISIT, EST, LEVL III, 20-29 MIN: ICD-10-PCS | Mod: S$GLB,,, | Performed by: PHYSICIAN ASSISTANT

## 2022-03-09 PROCEDURE — 1160F PR REVIEW ALL MEDS BY PRESCRIBER/CLIN PHARMACIST DOCUMENTED: ICD-10-PCS | Mod: CPTII,S$GLB,, | Performed by: PHYSICIAN ASSISTANT

## 2022-03-09 PROCEDURE — 1159F MED LIST DOCD IN RCRD: CPT | Mod: CPTII,S$GLB,, | Performed by: PHYSICIAN ASSISTANT

## 2022-03-09 PROCEDURE — 3074F SYST BP LT 130 MM HG: CPT | Mod: CPTII,S$GLB,, | Performed by: PHYSICIAN ASSISTANT

## 2022-03-09 PROCEDURE — 3008F BODY MASS INDEX DOCD: CPT | Mod: CPTII,S$GLB,, | Performed by: PHYSICIAN ASSISTANT

## 2022-03-09 RX ORDER — ALBUTEROL SULFATE 90 UG/1
1-2 AEROSOL, METERED RESPIRATORY (INHALATION) EVERY 6 HOURS PRN
Qty: 18 G | Refills: 0 | Status: SHIPPED | OUTPATIENT
Start: 2022-03-09 | End: 2023-03-09

## 2022-03-09 RX ORDER — PROMETHAZINE HYDROCHLORIDE AND DEXTROMETHORPHAN HYDROBROMIDE 6.25; 15 MG/5ML; MG/5ML
5 SYRUP ORAL EVERY 8 HOURS PRN
Qty: 118 ML | Refills: 0 | Status: SHIPPED | OUTPATIENT
Start: 2022-03-09 | End: 2022-03-19

## 2022-03-09 RX ORDER — BENZONATATE 200 MG/1
200 CAPSULE ORAL 3 TIMES DAILY PRN
Qty: 30 CAPSULE | Refills: 0 | Status: SHIPPED | OUTPATIENT
Start: 2022-03-09 | End: 2022-03-19

## 2022-03-09 NOTE — LETTER
[address] [place] [address] ? [place], [address] ? Phone [phone] ? Fax [phone]           Return to Work/School    Patient: Baron CALEB Shannon  YOB: 1995   Date: 03/09/2022      To Whom It May Concern:     Baron CALEB Shannon was in contact with/seen in my office on 03/09/2022. COVID-19 is present in our communities across the state. Not all patients are eligible or appropriate to be tested. In this situation, your employee meets the following criteria:     Baron CALEB Shannon can return to work once they have improvement in symptoms, without fever for 24 hours without the use of fever reducing medications AND at least 5 days from the start of symptoms (or from the first positive result if they have no symptoms). They should also wear mask for an additional 5 days after the 5 day quarantine. Retesting is not recommended.        If you have any questions or concerns, or if I can be of further assistance, please do not hesitate to contact me.     Sincerely,    Ethan Rivera PA-C

## 2022-03-09 NOTE — PROGRESS NOTES
Subjective:       Patient ID: Baron CALEB Shannon is a 26 y.o. male.    Vitals:  height is 6' (1.829 m) and weight is 93 kg (205 lb). His temperature is 97.7 °F (36.5 °C). His blood pressure is 107/73 and his pulse is 71. His respiration is 18 and oxygen saturation is 97%.     Chief Complaint: Cough and Sore Throat    Pt presents with complaint of sore throat, body aches, fatigue, cough that began yesterday. No fever or dyspnea. He has hx of asthma, states that breathing has been ok without complaint. Pt states took COVID-19 test yesterday results were negative.      Cough  This is a new problem. The current episode started in the past 7 days. The problem has been unchanged. The cough is non-productive. Associated symptoms include chills, headaches, nasal congestion and a sore throat. Pertinent negatives include no chest pain, ear congestion, ear pain, eye redness, fever, heartburn, hemoptysis, myalgias, postnasal drip, rash, rhinorrhea, shortness of breath, sweats, weight loss or wheezing. Treatments tried: Nyquil.  The treatment provided mild relief.       Constitution: Positive for chills. Negative for appetite change, sweating, fatigue and fever.   HENT: Positive for sore throat. Negative for ear pain, congestion and postnasal drip.    Neck: Negative for neck pain and neck stiffness.   Cardiovascular: Negative for chest pain, leg swelling, palpitations and sob on exertion.   Eyes: Negative for eye itching, eye pain and eye redness.   Respiratory: Positive for cough and asthma. Negative for bloody sputum, shortness of breath and wheezing.    Gastrointestinal: Negative for abdominal pain, nausea, vomiting, diarrhea and heartburn.   Genitourinary: Negative for dysuria, frequency, urgency, flank pain and hematuria.   Musculoskeletal: Negative for pain, joint pain, abnormal ROM of joint and muscle ache.   Skin: Negative for rash.   Allergic/Immunologic: Positive for asthma.   Neurological: Positive for headaches.  Negative for dizziness, light-headedness, disorientation, numbness and tingling.   Psychiatric/Behavioral: Negative for disorientation.       Objective:      Physical Exam   Constitutional: He is oriented to person, place, and time. He appears well-developed. He is cooperative.  Non-toxic appearance. He does not appear ill. No distress.   HENT:   Head: Normocephalic and atraumatic.   Ears:   Right Ear: Hearing and external ear normal.   Left Ear: Hearing and external ear normal.   Nose: Nose normal. No mucosal edema, rhinorrhea or nasal deformity. No epistaxis. Right sinus exhibits no maxillary sinus tenderness and no frontal sinus tenderness. Left sinus exhibits no maxillary sinus tenderness and no frontal sinus tenderness.   Mouth/Throat: Uvula is midline, oropharynx is clear and moist and mucous membranes are normal. No trismus in the jaw. Normal dentition. No uvula swelling. No oropharyngeal exudate, posterior oropharyngeal edema, posterior oropharyngeal erythema, tonsillar abscesses or cobblestoning. Tonsils are 2+ on the right. Tonsils are 2+ on the left. No tonsillar exudate.   Eyes: Conjunctivae and lids are normal. No scleral icterus.   Neck: Trachea normal and phonation normal. Neck supple. No edema present. No erythema present. No neck rigidity present.   Cardiovascular: Normal rate, regular rhythm, normal heart sounds and normal pulses.   Pulmonary/Chest: Effort normal and breath sounds normal. No accessory muscle usage or stridor. No tachypnea and no bradypnea. No respiratory distress. He has no decreased breath sounds. He has no wheezes. He has no rhonchi. He has no rales.   Abdominal: Normal appearance.   Musculoskeletal: Normal range of motion.         General: No deformity. Normal range of motion.   Lymphadenopathy:        Head (right side): Submandibular adenopathy present. No preauricular and no posterior auricular adenopathy present.        Head (left side): Submandibular adenopathy present. No  preauricular and no posterior auricular adenopathy present.     He has no cervical adenopathy.   Neurological: He is alert and oriented to person, place, and time. He exhibits normal muscle tone. Coordination normal.   Skin: Skin is warm, dry, intact, not diaphoretic and not pale.   Psychiatric: His speech is normal and behavior is normal. Judgment and thought content normal.   Nursing note and vitals reviewed.          Results for orders placed or performed in visit on 03/09/22   POCT Influenza A/B MOLECULAR   Result Value Ref Range    POC Molecular Influenza A Ag Negative Negative, Not Reported    POC Molecular Influenza B Ag Negative Negative, Not Reported     Acceptable Yes    POCT COVID-19 Rapid Screening   Result Value Ref Range    POC Rapid COVID Positive (A) Negative     Acceptable Yes      - counseled patient and answered questions in regards to COVID-19 testing and diagnosis and quarantine. Discussed home care and given follow up precautions.       Covid risk score  1      Assessment:       1. COVID-19 virus infection          Plan:         COVID-19 virus infection  -     POCT Influenza A/B MOLECULAR  -     POCT COVID-19 Rapid Screening  -     promethazine-dextromethorphan (PROMETHAZINE-DM) 6.25-15 mg/5 mL Syrp; Take 5 mLs by mouth every 8 (eight) hours as needed (cough).  Dispense: 118 mL; Refill: 0  -     benzonatate (TESSALON) 200 MG capsule; Take 1 capsule (200 mg total) by mouth 3 (three) times daily as needed for Cough.  Dispense: 30 capsule; Refill: 0  -     albuterol (PROVENTIL/VENTOLIN HFA) 90 mcg/actuation inhaler; Inhale 1-2 puffs into the lungs every 6 (six) hours as needed for Wheezing. Rescue  Dispense: 18 g; Refill: 0      Patient Instructions     You have tested positive for COVID-19 today.      ISOLATION    If you tested positive and do not have symptoms, you must isolate for 5 days starting on the day of the positive test.     If you tested positive and have  symptoms, you must isolate for 5 days starting on the day of the first symptoms,  not the day of the positive test.    This is the most important part: both the CDC and the [facility] emphasize that you do not test out of isolation.    After 5 days, if your symptoms have improved and you have not had fever on day 5, you can return to the community on day 6- NO TESTING REQUIRED! You must continue to wear mask on days 6-10.    In fact, we do not retest if you were positive in the last 90 days.    After your 5 days of isolation are completed, the CDC recommends strict mask use for the first 5 days that you come out of isolation.     - Rest.    - Drink plenty of fluids.  - Viral upper respiratory infections typically run their course in 10-14 days.     - Tylenol or Ibuprofen as directed as needed for fever/pain. Avoid tylenol if you have a history of liver disease. Do not take ibuprofen if you have a history of GI bleeding, kidney disease, or if you take blood thinners.     - You can take over-the-counter claritin, zyrtec, allegra, or xyzal as directed. These are antihistamines that can help with runny nose, nasal congestion, sneezing, and helps to dry up post-nasal drip, which usually causes sore throat and cough.   - If you do NOT have high blood pressure, you may use a decongestant form (D) of this medication (ie. Claritin- D, zyrtec-D, allegra-D) or if you do not take the D form, you can take sudafed (pseudoephedrine) over the counter, which is a decongestant. Do NOT take two decongestant (D) medications at the same time (such as mucinex-D and claritin-D or plain sudafed and claritin D)    - You can use Flonase (fluticasone) nasal spray as directed for sinus congestion and postnasal drip. This is a steroid nasal spray that works locally over time to decrease the inflammation in your nose/sinuses and help with allergic symptoms. This is not an quick- relief spray like afrin, but it works well if used daily.  Discontinue if  you develop nose bleed  - use nasal saline prior to Flonase.  - Use Ocean Spray Nasal Saline 1-3 puffs each nostril every 2-3 hours then blow out onto tissue. This is to irrigate the nasal passage way to clear the sinus openings. Use until sinus problem resolved.    - you can take plain Mucinex (guaifenesin) 1200 mg twice a day to help loosen mucous.     -warm salt water gargles can help with sore throat    - warm tea with honey can help with cough. Honey is a natural cough suppressant.    - Take the tessalon (benzonatate) as needed as prescribed for cough   - Only take the promethazine- DM as directed, as needed at night for cough. This medication may cause drowsiness.        - Follow up with your PCP or specialty clinic as directed in the next 1-2 weeks if not improved or as needed.  You can call [phone] to schedule an appointment with the appropriate provider.      - Go to the ER if you develop new or worsening symptoms.     - You must understand that you have received an Urgent Care treatment only and that you may be released before all of your medical problems are known or treated.   - You, the patient, will arrange for follow up care as instructed.   - If your condition worsens or fails to improve we recommend that you receive another evaluation at the ER immediately or contact your PCP to discuss your concerns or return here.

## 2022-03-09 NOTE — PATIENT INSTRUCTIONS
You have tested positive for COVID-19 today.      ISOLATION    If you tested positive and do not have symptoms, you must isolate for 5 days starting on the day of the positive test.     If you tested positive and have symptoms, you must isolate for 5 days starting on the day of the first symptoms,  not the day of the positive test.    This is the most important part: both the CDC and the [facility] emphasize that you do not test out of isolation.    After 5 days, if your symptoms have improved and you have not had fever on day 5, you can return to the community on day 6- NO TESTING REQUIRED! You must continue to wear mask on days 6-10.    In fact, we do not retest if you were positive in the last 90 days.    After your 5 days of isolation are completed, the CDC recommends strict mask use for the first 5 days that you come out of isolation.     - Rest.    - Drink plenty of fluids.  - Viral upper respiratory infections typically run their course in 10-14 days.     - Tylenol or Ibuprofen as directed as needed for fever/pain. Avoid tylenol if you have a history of liver disease. Do not take ibuprofen if you have a history of GI bleeding, kidney disease, or if you take blood thinners.     - You can take over-the-counter claritin, zyrtec, allegra, or xyzal as directed. These are antihistamines that can help with runny nose, nasal congestion, sneezing, and helps to dry up post-nasal drip, which usually causes sore throat and cough.   - If you do NOT have high blood pressure, you may use a decongestant form (D) of this medication (ie. Claritin- D, zyrtec-D, allegra-D) or if you do not take the D form, you can take sudafed (pseudoephedrine) over the counter, which is a decongestant. Do NOT take two decongestant (D) medications at the same time (such as mucinex-D and claritin-D or plain sudafed and claritin D)    - You can use Flonase (fluticasone) nasal spray as directed for sinus congestion and postnasal drip. This is a steroid  nasal spray that works locally over time to decrease the inflammation in your nose/sinuses and help with allergic symptoms. This is not an quick- relief spray like afrin, but it works well if used daily.  Discontinue if you develop nose bleed  - use nasal saline prior to Flonase.  - Use Ocean Spray Nasal Saline 1-3 puffs each nostril every 2-3 hours then blow out onto tissue. This is to irrigate the nasal passage way to clear the sinus openings. Use until sinus problem resolved.    - you can take plain Mucinex (guaifenesin) 1200 mg twice a day to help loosen mucous.     -warm salt water gargles can help with sore throat    - warm tea with honey can help with cough. Honey is a natural cough suppressant.    - Take the tessalon (benzonatate) as needed as prescribed for cough   - Only take the promethazine- DM as directed, as needed at night for cough. This medication may cause drowsiness.        - Follow up with your PCP or specialty clinic as directed in the next 1-2 weeks if not improved or as needed.  You can call [phone] to schedule an appointment with the appropriate provider.      - Go to the ER if you develop new or worsening symptoms.     - You must understand that you have received an Urgent Care treatment only and that you may be released before all of your medical problems are known or treated.   - You, the patient, will arrange for follow up care as instructed.   - If your condition worsens or fails to improve we recommend that you receive another evaluation at the ER immediately or contact your PCP to discuss your concerns or return here.

## 2022-03-15 ENCOUNTER — CLINICAL SUPPORT (OUTPATIENT)
Dept: REHABILITATION | Facility: OTHER | Age: 27
End: 2022-03-15
Attending: ORTHOPAEDIC SURGERY
Payer: COMMERCIAL

## 2022-03-15 DIAGNOSIS — M53.82 NECK MUSCLE WEAKNESS: ICD-10-CM

## 2022-03-15 DIAGNOSIS — R29.898 DECREASED RANGE OF MOTION OF NECK: Primary | ICD-10-CM

## 2022-03-15 DIAGNOSIS — R29.3 POOR POSTURE: ICD-10-CM

## 2022-03-15 PROCEDURE — 97110 THERAPEUTIC EXERCISES: CPT | Mod: CQ,97

## 2022-03-15 NOTE — PROGRESS NOTES
Physical Therapy Daily Treatment Note     Name: Baron CALEB Shannon  [facility] Number: [id number]    Therapy Diagnosis:   Encounter Diagnoses   Name Primary?    Decreased range of motion of neck Yes    Neck muscle weakness     Poor posture      Physician: Hola Terry MD    Visit Date: 3/15/2022    Physician Orders: PT Eval and Treat   Medical Diagnosis from Referral: M50.30 (ICD-10-CM) - DDD (degenerative disc disease), cervical  Evaluation Date: 1/13/2022  Authorization Period Expiration: 12/31/2022  Plan of Care Expiration: 3/13/2022  Visit # / Visits authorized: 6/ 20  Progress Note Due: 3/8/2022  FOTO: 1/ 1       Precautions: Standard     Time In: 3:00 pm  Time Out: 3:55 pm  Total Appointment Time (timed & untimed codes): 55 minutes      Subjective     Pt reports: he was out with covid and the migraines started coming back during his break.     He was compliant with home exercise program.  Response to previous treatment: Mild soreness  Functional change: fewer and less intense headaches    Pain: 0/10  Location: bilateral cervical paraspinals, HA    Objective       Cervical Range of Motion:     Degrees Pain   Flexion 43 -      Extension 66 -      Right Side Bending 44 -      Left Side Bending 44 -      Right Rotation 68 -   Left Rotation 64 -     Upper Extremity Strength  (R) UE   (L) UE     Shoulder elevation: 5/5 Shoulder elevation: 5/5   Shoulder flexion: 5/5 Shoulder flexion: 5/5   Shoulder Abduction: 5/5 Shoulder abduction: 5/5   Shoulder ER 5/5 Shoulder ER 5/5   Shoulder IR 5/5 Shoulder IR 5/5   Elbow flexion: 5/5 Elbow flexion: 5/5   Elbow extension: 5/5 Elbow extension: 5/5   Wrist flexion: 5/5 Wrist flexion: 5/5   Wrist extension: 5/5 Wrist extension: 5/5    5/5 : 5/5   Lower Trap 4/5 Lower Trap 4/5   Middle Trap 4+/5 Middle Trap 4+/5   Rhomboids 4+/5 Rhomboids 4+/5      Strength:  Cervical MMT   Flexion 4+/5   Extension 4+/5   Right Side Bend 4+/5   Left Side Bend 4+/5          Baron phan  "therapeutic exercises to develop strength, endurance, ROM, flexibility, posture and core stabilization for 55 minutes including:    UT stretch 2 x 30 sec  LS stretch 3 x 20 sec  Cervical Retract + extension w/towel x 10 5"  Seated chin tucks 10x3"  Seated chin tuck w/ mini rotation x10  Supine chin tucks 10x3"   scalene stretch 5x10" ea  Rows 90# 2x10x3" - NP  SOC + No money GTB 15x3"  Thoracic ext over FR 10x5"   Serratus press with shoulder flexion with ball at wall x 10 and YTB  Cat/cow x10   Supine pec stretch on half foam 2 min  Medex cervical extension 165# x20 (warm up at 90# x5 reps)(RPE 3/10)(Seat pos. 202)(increase resistance 10% next visit)  Supine alternating shoulder flexion x 20  Supine diagonals with GTB x 20  Serratus punches with 3# on half foam x 20  Open books x10  snow angels RTB x10  serratus wall slides + lift off RTB 10x3"  prone over PB:   W's 2# 10x3   Y's, T,s I,s 2# x3    NP:  cervical Rotational stretch w/towel x 10 5"    Dawson received the following manual therapy techniques: FDN, Myofacial release and Soft tissue Mobilization were applied to the: cervical spine for 10 minutes, including:    Suboccipital release    Dawson received cold pack for 0 minutes to cervical area     Home Exercises Provided and Patient Education Provided     Education provided:   - cues with ex's  - Postural awareness    Written Home Exercises Provided: Patient instructed to cont prior HEP.     See EMR under Patient Instructions for exercises provided prior visit and today's visit.    Assessment   Discussed the benefits of mindfulness meditation and other stress relief techniques to address insomnia. Pt practiced diaphragmatic breathing during supine pec stretch. Pt presents without pain today but has been experiencing migraines since his last visit. PTA notified PT and they plan on dry needling today. Pt required tactile cues to engage serratus during serratus wall slides with the ball due to over activation of " upper traps. Patient tolerated increase of weight on c/s Medx machine without pain.      Continue to progress as tolerated to address strength and ROM deficits.        Is progressing well towards his goals.   Pt prognosis is Good.     Pt will continue to benefit from skilled outpatient physical therapy to address the deficits listed in the problem list box on initial evaluation, provide pt/family education and to maximize pt's level of independence in the home and community environment.     Pt's spiritual, cultural and educational needs considered and pt agreeable to plan of care and goals.     Anticipated barriers to physical therapy: None    GOALS: Short Term Goals: 4 weeks  1.Report decreased in pain at worse less than  <   / =  5  /10  to increase tolerance for functional activities. On going  2. Pt to improve range of motion of cervical by 25% to allow for improved functional mobility to allow for improvement in IADLs.  MET  3. Increased cervical MMT 1/2  grade to increase tolerance for ADL and work activities. MET  4. Pt to report no more than one headache a week so that he may increase tolerance for ADL. On going  5. Pt to tolerate HEP to improve ROM and independence with ADL's. MET  6. Increased MMT  for lower traps/middle traps/rhomboids to > or = 4+/5 to increase tolerance for ADL and improve posture. On going     Long Term Goals: 8 weeks  1.Report decreased in pain at worse less than  <   / =  3  /10  to increase tolerance for functional activities. On going  2.Pt to improve range of motion by 75% to allow for improved functional mobility to allow for improvement in IADLs. On going  3.Increased cervical MMT  1 grade  to increase tolerance for ADL and work activities.On going  4.  Pt will report 27% or less on FOTO neck survey score for neck pain disability to demonstrate decrease in disability and improvement in neck pain.On going  5. Pt to be Independent with HEP to improve ROM and independence  with ADL's. On going  6. Increased MMT  for lower traps/middle traps/rhomboids to > or = 5/5 to increase tolerance for ADL and improve posture. On going       Plan     Plan of care Certification: 1/13/2022 to 3/13/2022.     Outpatient Physical Therapy 1 times weekly for 8 weeks to include the following interventions: Cervical/Lumbar Traction, Manual Therapy, Moist Heat/ Ice, Neuromuscular Re-ed, Patient Education, Therapeutic Activities and Therapeutic Exercise. Dry anjelica Hoffman, PTA

## 2022-03-22 ENCOUNTER — CLINICAL SUPPORT (OUTPATIENT)
Dept: REHABILITATION | Facility: OTHER | Age: 27
End: 2022-03-22
Attending: ORTHOPAEDIC SURGERY
Payer: COMMERCIAL

## 2022-03-22 DIAGNOSIS — M53.82 NECK MUSCLE WEAKNESS: ICD-10-CM

## 2022-03-22 DIAGNOSIS — R29.3 POOR POSTURE: ICD-10-CM

## 2022-03-22 DIAGNOSIS — R29.898 DECREASED RANGE OF MOTION OF NECK: Primary | ICD-10-CM

## 2022-03-22 PROCEDURE — 97140 MANUAL THERAPY 1/> REGIONS: CPT | Mod: 97

## 2022-03-22 PROCEDURE — 97110 THERAPEUTIC EXERCISES: CPT | Mod: 97

## 2022-03-22 NOTE — PROGRESS NOTES
Physical Therapy Daily Treatment Note     Name: Baron CALEB Shannon  [facility] Number: [id number]    Therapy Diagnosis:   Encounter Diagnoses   Name Primary?    Decreased range of motion of neck Yes    Neck muscle weakness     Poor posture      Physician: Hola Terry MD    Visit Date: 3/22/2022    Physician Orders: PT Eval and Treat   Medical Diagnosis from Referral: M50.30 (ICD-10-CM) - DDD (degenerative disc disease), cervical  Evaluation Date: 1/13/2022  Authorization Period Expiration: 12/31/2022  Plan of Care Expiration: 4/26/2022  Visit # / Visits authorized: 8/ 20  Progress Note Due: 4/22/2022  FOTO: 1/ 1       Precautions: Standard     Time In: 3:05 pm  Time Out: 4:00 pm  Total Appointment Time (timed & untimed codes): 55 minutes      Subjective     Pt reports: he has only had one headache since last visit, but it was of decreased intensity    He was compliant with home exercise program.  Response to previous treatment: Mild soreness  Functional change: fewer and less intense headaches    Pain: 0/10  Location: bilateral cervical paraspinals, HA    Objective       Cervical Range of Motion:     Degrees Pain   Flexion 45 -      Extension 68 -      Right Side Bending 46 -      Left Side Bending 45 -      Right Rotation 72 -   Left Rotation 70 -     Upper Extremity Strength  (R) UE   (L) UE     Shoulder elevation: 5/5 Shoulder elevation: 5/5   Shoulder flexion: 5/5 Shoulder flexion: 5/5   Shoulder Abduction: 5/5 Shoulder abduction: 5/5   Shoulder ER 5/5 Shoulder ER 5/5   Shoulder IR 5/5 Shoulder IR 5/5   Elbow flexion: 5/5 Elbow flexion: 5/5   Elbow extension: 5/5 Elbow extension: 5/5   Wrist flexion: 5/5 Wrist flexion: 5/5   Wrist extension: 5/5 Wrist extension: 5/5    5/5 : 5/5   Lower Trap 4+/5 Lower Trap 4+/5   Middle Trap 4+/5 Middle Trap 4+/5   Rhomboids 4+/5 Rhomboids 4+/5      Strength:  Cervical MMT   Flexion 5/5   Extension 5/5   Right Side Bend 5/5   Left Side Bend 5/5          Baron phan  "therapeutic exercises to develop strength, endurance, ROM, flexibility, posture and core stabilization for 45 minutes including:    UT stretch 2 x 30 sec  LS stretch 3 x 20 sec  Cervical Retract + extension w/towel x 10 5"  Seated chin tucks 10x3"   Seated chin tuck w/ mini rotation x10  Supine chin tucks 10x5"   scalene stretch 5x10" ea  Rows 90# 2x10x3" - NP  SOC + No money GTB 15x3"  Thoracic ext over FR 10x5"   Serratus press with shoulder flexion with ball at wall x 10 and YTB  Cat/cow x10   Supine pec stretch on half foam 2 min  Medex cervical extension 180# x15 (warm up at 90# x5 reps)(RPE 3/10)(Seat pos. 202)(increase reps next visit)  Supine alternating shoulder flexion x 20  Supine diagonals with GTB x 20  Serratus punches with 3# on half foam x 20  Open books x10  snow angels RTB x10  serratus wall slides + lift off GTB 10x3"  prone over PB:   W's 3# 10x3   Y's, T,s I,s 3# x3    NP:  cervical Rotational stretch w/towel x 10 5"     received the following manual therapy techniques: FDN, Myofacial release and Soft tissue Mobilization were applied to the: cervical spine for 15 minutes, including:    FDN: pt received trigger point dry needling of cervical paraspinals today. (1) needle was inserted on each side from C4-C5. And (2) needles were inserted at C3 for a total of (8) needles.      received cold pack for 0 minutes to cervical area     Home Exercises Provided and Patient Education Provided     Education provided:   - cues with ex's  - Postural awareness    Written Home Exercises Provided: Patient instructed to cont prior HEP.     See EMR under Patient Instructions for exercises provided prior visit and today's visit.    Assessment   Pt presents today without complaints of pain. Reassessment performed with pt demo improved cervical ROM and strength as well as improved periscapular strength. Dry needling applied to cervical paraspinals to decrease tightness and headaches, pt with good tolerance " and no adverse effects. Continue to progress as tolerated.          Is progressing well towards his goals.   Pt prognosis is Good.     Pt will continue to benefit from skilled outpatient physical therapy to address the deficits listed in the problem list box on initial evaluation, provide pt/family education and to maximize pt's level of independence in the home and community environment.     Pt's spiritual, cultural and educational needs considered and pt agreeable to plan of care and goals.     Anticipated barriers to physical therapy: None    GOALS: Short Term Goals: 4 weeks  1.Report decreased in pain at worse less than  <   / =  5  /10  to increase tolerance for functional activities. On going  2. Pt to improve range of motion of cervical by 25% to allow for improved functional mobility to allow for improvement in IADLs.  MET  3. Increased cervical MMT 1/2  grade to increase tolerance for ADL and work activities. MET  4. Pt to report no more than one headache a week so that he may increase tolerance for ADL. MET  5. Pt to tolerate HEP to improve ROM and independence with ADL's. MET  6. Increased MMT  for lower traps/middle traps/rhomboids to > or = 4+/5 to increase tolerance for ADL and improve posture. MET     Long Term Goals: 8 weeks  1.Report decreased in pain at worse less than  <   / =  3  /10  to increase tolerance for functional activities. On going  2.Pt to improve range of motion by 75% to allow for improved functional mobility to allow for improvement in IADLs. MET  3.Increased cervical MMT  1 grade  to increase tolerance for ADL and work activities. MET  4.  Pt will report 27% or less on FOTO neck survey score for neck pain disability to demonstrate decrease in disability and improvement in neck pain.On going  5. Pt to be Independent with HEP to improve ROM and independence with ADL's. On going  6. Increased MMT  for lower traps/middle traps/rhomboids to > or = 5/5 to increase tolerance for  ADL and improve posture. On going       Plan     Plan of care Certification: 1/13/2022 to 4/26/2022.     Outpatient Physical Therapy 2 times weekly for 6 weeks to include the following interventions: Cervical/Lumbar Traction, Manual Therapy, Moist Heat/ Ice, Neuromuscular Re-ed, Patient Education, Therapeutic Activities and Therapeutic Exercise. Dry needling    Darin Olivarez, RYAN

## 2022-03-28 NOTE — PROGRESS NOTES
Physical Therapy Daily Treatment Note     Name: Baron CALEB Shannon  Clinic Number: [id number]    Therapy Diagnosis:   No diagnosis found.  Physician: Hola Terry MD    Visit Date: 3/29/2022    Physician Orders: PT Eval and Treat   Medical Diagnosis from Referral: M50.30 (ICD-10-CM) - DDD (degenerative disc disease), cervical  Evaluation Date: 1/13/2022  Authorization Period Expiration: 12/31/2022  Plan of Care Expiration: 4/26/2022  Visit # / Visits authorized: 8/ 20  Progress Note Due: 4/22/2022  FOTO: 1/ 1       Precautions: Standard     Time In: 3:05 pm  Time Out: 4:00 pm  Total Appointment Time (timed & untimed codes): 55 minutes      Subjective     Pt reports: he has only had one headache since last visit, but it was of decreased intensity    He was compliant with home exercise program.  Response to previous treatment: Mild soreness  Functional change: fewer and less intense headaches    Pain: 0/10  Location: bilateral cervical paraspinals, HA    Objective       Cervical Range of Motion:     Degrees Pain   Flexion 45 -      Extension 68 -      Right Side Bending 46 -      Left Side Bending 45 -      Right Rotation 72 -   Left Rotation 70 -     Upper Extremity Strength  (R) UE   (L) UE     Shoulder elevation: 5/5 Shoulder elevation: 5/5   Shoulder flexion: 5/5 Shoulder flexion: 5/5   Shoulder Abduction: 5/5 Shoulder abduction: 5/5   Shoulder ER 5/5 Shoulder ER 5/5   Shoulder IR 5/5 Shoulder IR 5/5   Elbow flexion: 5/5 Elbow flexion: 5/5   Elbow extension: 5/5 Elbow extension: 5/5   Wrist flexion: 5/5 Wrist flexion: 5/5   Wrist extension: 5/5 Wrist extension: 5/5    5/5 : 5/5   Lower Trap 4+/5 Lower Trap 4+/5   Middle Trap 4+/5 Middle Trap 4+/5   Rhomboids 4+/5 Rhomboids 4+/5      Strength:  Cervical MMT   Flexion 5/5   Extension 5/5   Right Side Bend 5/5   Left Side Bend 5/5           received therapeutic exercises to develop strength, endurance, ROM, flexibility, posture and core stabilization for  "45 minutes including:    UT stretch 2 x 30 sec  LS stretch 3 x 20 sec  Cervical Retract + extension w/towel x 10 5"  Seated chin tucks 10x3"   Seated chin tuck w/ mini rotation x10  Supine chin tucks 10x5"   scalene stretch 5x10" ea  Rows 90# 2x10x3" - NP  SOC + No money GTB 15x3"  Thoracic ext over FR 10x5"   Serratus press with shoulder flexion with ball at wall x 10 and YTB  Cat/cow x10   Supine pec stretch on half foam 2 min  Medex cervical extension 180# x15 (warm up at 90# x5 reps)(RPE 3/10)(Seat pos. 202)(increase reps next visit)  Supine alternating shoulder flexion x 20  Supine diagonals with GTB x 20  Serratus punches with 3# on half foam x 20  Open books x10  snow angels RTB x10  serratus wall slides + lift off GTB 10x3"  prone over PB:   W's 3# 10x3   Y's, T,s I,s 3# x3    NP:  cervical Rotational stretch w/towel x 10 5"     received the following manual therapy techniques: FDN, Myofacial release and Soft tissue Mobilization were applied to the: cervical spine for 15 minutes, including:    FDN: pt received trigger point dry needling of cervical paraspinals today. (1) needle was inserted on each side from C4-C5. And (2) needles were inserted at C3 for a total of (8) needles.      received cold pack for 0 minutes to cervical area     Home Exercises Provided and Patient Education Provided     Education provided:   - cues with ex's  - Postural awareness    Written Home Exercises Provided: Patient instructed to cont prior HEP.     See EMR under Patient Instructions for exercises provided prior visit and today's visit.    Assessment   Pt presents today without complaints of pain. Reassessment performed with pt demo improved cervical ROM and strength as well as improved periscapular strength. Dry needling applied to cervical paraspinals to decrease tightness and headaches, pt with good tolerance and no adverse effects. Continue to progress as tolerated.          Is progressing well towards his " goals.   Pt prognosis is Good.     Pt will continue to benefit from skilled outpatient physical therapy to address the deficits listed in the problem list box on initial evaluation, provide pt/family education and to maximize pt's level of independence in the home and community environment.     Pt's spiritual, cultural and educational needs considered and pt agreeable to plan of care and goals.     Anticipated barriers to physical therapy: None    GOALS: Short Term Goals: 4 weeks  1.Report decreased in pain at worse less than  <   / =  5  /10  to increase tolerance for functional activities. On going  2. Pt to improve range of motion of cervical by 25% to allow for improved functional mobility to allow for improvement in IADLs.  MET  3. Increased cervical MMT 1/2  grade to increase tolerance for ADL and work activities. MET  4. Pt to report no more than one headache a week so that he may increase tolerance for ADL. MET  5. Pt to tolerate HEP to improve ROM and independence with ADL's. MET  6. Increased MMT  for lower traps/middle traps/rhomboids to > or = 4+/5 to increase tolerance for ADL and improve posture. MET     Long Term Goals: 8 weeks  1.Report decreased in pain at worse less than  <   / =  3  /10  to increase tolerance for functional activities. On going  2.Pt to improve range of motion by 75% to allow for improved functional mobility to allow for improvement in IADLs. MET  3.Increased cervical MMT  1 grade  to increase tolerance for ADL and work activities. MET  4.  Pt will report 27% or less on FOTO neck survey score for neck pain disability to demonstrate decrease in disability and improvement in neck pain.On going  5. Pt to be Independent with HEP to improve ROM and independence with ADL's. On going  6. Increased MMT  for lower traps/middle traps/rhomboids to > or = 5/5 to increase tolerance for ADL and improve posture. On going       Plan     Plan of care Certification: 1/13/2022 to  4/26/2022.     Outpatient Physical Therapy 2 times weekly for 6 weeks to include the following interventions: Cervical/Lumbar Traction, Manual Therapy, Moist Heat/ Ice, Neuromuscular Re-ed, Patient Education, Therapeutic Activities and Therapeutic Exercise. Dalia Hoffman, PTA

## 2022-03-29 ENCOUNTER — CLINICAL SUPPORT (OUTPATIENT)
Dept: REHABILITATION | Facility: OTHER | Age: 27
End: 2022-03-29
Attending: ORTHOPAEDIC SURGERY
Payer: COMMERCIAL

## 2022-03-29 DIAGNOSIS — R29.3 POOR POSTURE: ICD-10-CM

## 2022-03-29 DIAGNOSIS — M53.82 NECK MUSCLE WEAKNESS: ICD-10-CM

## 2022-03-29 DIAGNOSIS — R29.898 DECREASED RANGE OF MOTION OF NECK: Primary | ICD-10-CM

## 2022-03-29 PROCEDURE — 97140 MANUAL THERAPY 1/> REGIONS: CPT | Mod: 97

## 2022-03-29 PROCEDURE — 97110 THERAPEUTIC EXERCISES: CPT | Mod: 97

## 2022-03-29 NOTE — PROGRESS NOTES
Physical Therapy Daily Treatment Note/Reassessment     Name: Baron CALEB Shannon  [facility] Number: [id number]    Therapy Diagnosis:   Encounter Diagnoses   Name Primary?    Decreased range of motion of neck Yes    Neck muscle weakness     Poor posture      Physician: Hola Terry MD    Visit Date: 3/29/2022    Physician Orders: PT Eval and Treat   Medical Diagnosis from Referral: M50.30 (ICD-10-CM) - DDD (degenerative disc disease), cervical  Evaluation Date: 1/13/2022  Authorization Period Expiration: 5/31/2022  Plan of Care Expiration: Updated to 5/29/2022  Visit # / Visits authorized: 9/ 20  Progress Note Due: 4/29/2022  FOTO: 2/2  FOTO Next Visit       Precautions: Standard     Time In: 5:00 pm  Time Out: 6:00 pm  Total Appointment Time (timed & untimed codes): 60 minutes      Subjective     Pt reports: he has not had any headaches since last visit, he has been sleeping better. He feels both his upper traps are tight and is interested in dry needling of those muscules    He was compliant with home exercise program.  Response to previous treatment: Mild soreness  Functional change: no headaches and better sleep    Pain: 0/10  Location: bilateral cervical paraspinals, HA    Objective       Cervical Range of Motion: - reassessed 3/31/2022    Degrees Pain   Flexion 45 -      Extension 71 -      Right Side Bending 50 -      Left Side Bending 48 -      Right Rotation 79 -   Left Rotation 77 -     Upper Extremity Strength  (R) UE   (L) UE     Shoulder elevation: 5/5 Shoulder elevation: 5/5   Shoulder flexion: 5/5 Shoulder flexion: 5/5   Shoulder Abduction: 5/5 Shoulder abduction: 5/5   Shoulder ER 5/5 Shoulder ER 5/5   Shoulder IR 5/5 Shoulder IR 5/5   Elbow flexion: 5/5 Elbow flexion: 5/5   Elbow extension: 5/5 Elbow extension: 5/5   Wrist flexion: 5/5 Wrist flexion: 5/5   Wrist extension: 5/5 Wrist extension: 5/5    5/5 : 5/5   Lower Trap 4+/5 Lower Trap 4+/5   Middle Trap 4+/5 Middle Trap 4+/5   Rhomboids 4+/5  "Rhomboids 4+/5      Strength:  Cervical MMT   Flexion 5/5   Extension 5/5   Right Side Bend 5/5   Left Side Bend 5/5           received therapeutic exercises to develop strength, endurance, ROM, flexibility, posture and core stabilization for 45 minutes including:    UT stretch 2 x 30 sec  LS stretch 3 x 20 sec  Cervical Retract + extension w/towel x 10 5"  Seated chin tucks 10x3"   Seated chin tuck w/ mini rotation x10  Supine chin tucks 10x5"   scalene stretch 5x10" ea  Rows 90# 2x10x3" - NP  SOC + No money GTB 15x3"  Thoracic ext over FR 10x5"   Serratus press with shoulder flexion with ball at wall x 10 and YTB  Cat/cow x10   Supine pec stretch on half foam 2 min  Medex cervical extension 180# x20 (warm up at 90# x5 reps) (RPE 2-3/10) (Seat pos. 202) (increase resistance 10% next visit)  Supine alternating shoulder flexion x 20  Supine diagonals with GTB x 20  Serratus punches with 3# on half foam x 20  Open books x10  snow angels GTB x10  serratus wall slides + lift off GTB 10x3"  prone over PB:   W's 3# 10x3   Y's, T,s I,s 3# x3    NP:  cervical Rotational stretch w/towel x 10 5"     received the following manual therapy techniques: FDN, Myofacial release and Soft tissue Mobilization were applied to the: cervical spine for 15 minutes, including:    FDN: pt received trigger point dry needling of cervical paraspinals and bilateral UT today. (1) needle was inserted on each side from C4-C5. And (2) needles were inserted at C3 for a total of (8) needles. (2) needles were inserted into trigger points in bilateral upper traps and were manipulated for 1-2 minutes each for a total of (4) needles.      received cold pack for 0 minutes to cervical area     Home Exercises Provided and Patient Education Provided     Education provided:   - cues with ex's  - Postural awareness    Written Home Exercises Provided: Patient instructed to cont prior HEP.     See EMR under Patient Instructions for exercises " provided prior visit and today's visit.    Assessment   Pt presents today without complaints of pain or headaches. Reassessment again performed with pt demo improved cervical ROM and strength as well as improved periscapular strength from initial evaluation. Only a slight deficits in lower/middle trap and rhomboid strength, 5/5 MMT in all other UE and cervical MMT. Dry needling applied to cervical paraspinals and bilateral upper trapezius to decrease tightness and headaches, pt with good tolerance and no adverse effects. Continue to progress as tolerated. Pt to take a 4 week break from therapy to determine if there is a lasting effect and he is able to go without headaches. Will be reassessed at next follow up.         Is progressing well towards his goals.   Pt prognosis is Good.     Pt will continue to benefit from skilled outpatient physical therapy to address the deficits listed in the problem list box on initial evaluation, provide pt/family education and to maximize pt's level of independence in the home and community environment.     Pt's spiritual, cultural and educational needs considered and pt agreeable to plan of care and goals.     Anticipated barriers to physical therapy: None    GOALS: Short Term Goals: 4 weeks  1.Report decreased in pain at worse less than  <   / =  5  /10  to increase tolerance for functional activities. MET  2. Pt to improve range of motion of cervical by 25% to allow for improved functional mobility to allow for improvement in IADLs.  MET  3. Increased cervical MMT 1/2  grade to increase tolerance for ADL and work activities. MET  4. Pt to report no more than one headache a week so that he may increase tolerance for ADL. MET  5. Pt to tolerate HEP to improve ROM and independence with ADL's. MET  6. Increased MMT  for lower traps/middle traps/rhomboids to > or = 4+/5 to increase tolerance for ADL and improve posture. MET     Long Term Goals: 8 weeks  1.Report decreased in  pain at worse less than  <   / =  3  /10  to increase tolerance for functional activities. On going  2.Pt to improve range of motion by 75% to allow for improved functional mobility to allow for improvement in IADLs. MET  3.Increased cervical MMT  1 grade  to increase tolerance for ADL and work activities. MET  4.  Pt will report 27% or less on FOTO neck survey score for neck pain disability to demonstrate decrease in disability and improvement in neck pain.On going  5. Pt to be Independent with HEP to improve ROM and independence with ADL's. On going  6. Increased MMT  for lower traps/middle traps/rhomboids to > or = 5/5 to increase tolerance for ADL and improve posture. On going       Plan     Plan of care Certification: 1/13/2022 to 5/29/2022.     Outpatient Physical Therapy 1 times weekly for 8 weeks to include the following interventions: Cervical/Lumbar Traction, Manual Therapy, Moist Heat/ Ice, Neuromuscular Re-ed, Patient Education, Therapeutic Activities and Therapeutic Exercise. Dry needling    Darin Olivarez, PT

## 2022-03-31 NOTE — PLAN OF CARE
Outpatient Therapy Updated Plan of Care     Visit Date: 3/29/2022    Name: Baron CALEB Shannon  [facility] Number: [id number]    Therapy Diagnosis:   Encounter Diagnoses   Name Primary?    Decreased range of motion of neck Yes    Neck muscle weakness     Poor posture      Physician: Hola Terry MD    Physician Orders: PT Eval and Treat   Medical Diagnosis from Referral: M50.30 (ICD-10-CM) - DDD (degenerative disc disease), cervical  Evaluation Date: 1/13/2022  Authorization Period Expiration: 5/31/2022  Plan of Care Expiration: Updated to 5/29/2022  Visit # / Visits authorized: 9/ 20  Progress Note Due: 4/29/2022  FOTO: 2/2                   FOTO Next Visit           Precautions: Standard    Subjective     Update: he has not had any headaches since last visit, he has been sleeping better. He feels both his upper traps are tight and is interested in dry needling of those muscules     He was compliant with home exercise program.  Response to previous treatment: Mild soreness  Functional change: no headaches and better sleep     Pain: 0/10  Location: bilateral cervical paraspinals, ELLIS    Objective     Update:        Cervical Range of Motion: - reassessed 3/31/2022    Degrees Pain   Flexion 45 -      Extension 71 -      Right Side Bending 50 -      Left Side Bending 48 -      Right Rotation 79 -   Left Rotation 77 -      Upper Extremity Strength  (R) UE   (L) UE     Shoulder elevation: 5/5 Shoulder elevation: 5/5   Shoulder flexion: 5/5 Shoulder flexion: 5/5   Shoulder Abduction: 5/5 Shoulder abduction: 5/5   Shoulder ER 5/5 Shoulder ER 5/5   Shoulder IR 5/5 Shoulder IR 5/5   Elbow flexion: 5/5 Elbow flexion: 5/5   Elbow extension: 5/5 Elbow extension: 5/5   Wrist flexion: 5/5 Wrist flexion: 5/5   Wrist extension: 5/5 Wrist extension: 5/5    5/5 : 5/5   Lower Trap 4+/5 Lower Trap 4+/5   Middle Trap 4+/5 Middle Trap 4+/5   Rhomboids 4+/5 Rhomboids 4+/5      Strength:  Cervical MMT   Flexion 5/5   Extension 5/5    Right Side Bend 5/5   Left Side Bend 5/5              Assessment     Update: Pt presents today without complaints of pain or headaches. Reassessment again performed with pt demo improved cervical ROM and strength as well as improved periscapular strength from initial evaluation. Only a slight deficits in lower/middle trap and rhomboid strength, 5/5 MMT in all other UE and cervical MMT. Dry needling applied to cervical paraspinals and bilateral upper trapezius to decrease tightness and headaches, pt with good tolerance and no adverse effects. Continue to progress as tolerated. Pt to take a 4 week break from therapy to determine if there is a lasting effect and he is able to go without headaches. Will be reassessed at next follow up.            Is progressing well towards his goals.   Pt prognosis is Good.      Pt will continue to benefit from skilled outpatient physical therapy to address the deficits listed in the problem list box on initial evaluation, provide pt/family education and to maximize pt's level of independence in the home and community environment.      Pt's spiritual, cultural and educational needs considered and pt agreeable to plan of care and goals.     Anticipated barriers to physical therapy: None     GOALS: Short Term Goals: 4 weeks  1.Report decreased in pain at worse less than  <   / =  5  /10  to increase tolerance for functional activities. MET  2. Pt to improve range of motion of cervical by 25% to allow for improved functional mobility to allow for improvement in IADLs.  MET  3. Increased cervical MMT 1/2  grade to increase tolerance for ADL and work activities. MET  4. Pt to report no more than one headache a week so that he may increase tolerance for ADL. MET  5. Pt to tolerate HEP to improve ROM and independence with ADL's. MET  6. Increased MMT  for lower traps/middle traps/rhomboids to > or = 4+/5 to increase tolerance for ADL and improve posture. MET     Long Term Goals: 8  "weeks  1.Report decreased in pain at worse less than  <   / =  3  /10  to increase tolerance for functional activities. On going  2.Pt to improve range of motion by 75% to allow for improved functional mobility to allow for improvement in IADLs. MET  3.Increased cervical MMT  1 grade  to increase tolerance for ADL and work activities. MET  4.  Pt will report 27% or less on FOTO neck survey score for neck pain disability to demonstrate decrease in disability and improvement in neck pain.On going  5. Pt to be Independent with HEP to improve ROM and independence with ADL's. On going  6. Increased MMT  for lower traps/middle traps/rhomboids to > or = 5/5 to increase tolerance for ADL and improve posture. On going     Reasons for Recertification of Therapy:   To allow patient to complete full allotment of visits so that they may achieve maximum therapeutic benefit      Plan     Updated Certification Period: 3/29/2022 to 5/29/2022  Recommended Treatment Plan: 1 times per week for 8 weeks:    - Patient education  - Therapeutic exercise  - Manual therapy  - Performance testing   - Neuromuscular Re-education  - Therapeutic activity   - Modalities  - dry needling    Pt may be seen by PTA as part of the rehabilitation team.     Therapist: Darin Olivarez, PT  3/31/2022    "I certify the need for these services furnished under this plan of treatment and while under my care."    ____________________________________  Physician/Referring Practitioner    _______________  Date of Signature    Darin Olivarez, PT  3/29/2022      I CERTIFY THE NEED FOR THESE SERVICES FURNISHED UNDER THIS PLAN OF TREATMENT AND WHILE UNDER MY CARE    Physician's comments:        Physician's Signature: ___________________________________________________    "

## 2022-04-26 ENCOUNTER — CLINICAL SUPPORT (OUTPATIENT)
Dept: REHABILITATION | Facility: OTHER | Age: 27
End: 2022-04-26
Attending: ORTHOPAEDIC SURGERY
Payer: COMMERCIAL

## 2022-04-26 DIAGNOSIS — M53.82 NECK MUSCLE WEAKNESS: ICD-10-CM

## 2022-04-26 DIAGNOSIS — R29.898 DECREASED RANGE OF MOTION OF NECK: Primary | ICD-10-CM

## 2022-04-26 DIAGNOSIS — R29.3 POOR POSTURE: ICD-10-CM

## 2022-04-26 PROCEDURE — 97110 THERAPEUTIC EXERCISES: CPT | Mod: 97

## 2022-04-26 PROCEDURE — 97140 MANUAL THERAPY 1/> REGIONS: CPT | Mod: 97

## 2022-04-26 NOTE — PROGRESS NOTES
Physical Therapy Daily Treatment Note/Discharge     Name: Baron CALEB Shannon  [facility] Number: [id number]    Therapy Diagnosis:   Encounter Diagnoses   Name Primary?    Decreased range of motion of neck Yes    Neck muscle weakness     Poor posture      Physician: Hola Terry MD    Visit Date: 4/26/2022    Physician Orders: PT Eval and Treat   Medical Diagnosis from Referral: M50.30 (ICD-10-CM) - DDD (degenerative disc disease), cervical  Evaluation Date: 1/13/2022  Authorization Period Expiration: 5/31/2022  Plan of Care Expiration: Updated to 5/29/2022  Visit # / Visits authorized: 10/ 20  Progress Note Due: N/A  FOTO: 2/2         Precautions: Standard     Time In: 3:00 pm  Time Out: 4:00 pm  Total Appointment Time (timed & untimed codes): 55 minutes      Subjective     Pt reports: he only had one headache since he was last here. He feels the dry needling really helped and he feels ready for discharge    He was compliant with home exercise program.  Response to previous treatment: Mild soreness  Functional change: no headaches and better sleep    Pain: 0/10  Location: bilateral cervical paraspinals, HA    Objective       Cervical Range of Motion: - reassessed 3/31/2022    Degrees Pain   Flexion 45 -      Extension 71 -      Right Side Bending 50 -      Left Side Bending 48 -      Right Rotation 79 -   Left Rotation 77 -     Upper Extremity Strength  (R) UE   (L) UE     Shoulder elevation: 5/5 Shoulder elevation: 5/5   Shoulder flexion: 5/5 Shoulder flexion: 5/5   Shoulder Abduction: 5/5 Shoulder abduction: 5/5   Shoulder ER 5/5 Shoulder ER 5/5   Shoulder IR 5/5 Shoulder IR 5/5   Elbow flexion: 5/5 Elbow flexion: 5/5   Elbow extension: 5/5 Elbow extension: 5/5   Wrist flexion: 5/5 Wrist flexion: 5/5   Wrist extension: 5/5 Wrist extension: 5/5    5/5 : 5/5   Lower Trap 4+/5 Lower Trap 4+/5   Middle Trap 4+/5 Middle Trap 4+/5   Rhomboids 4+/5 Rhomboids 4+/5      Strength:  Cervical MMT   Flexion 5/5  "  Extension 5/5   Right Side Bend 5/5   Left Side Bend 5/5           received therapeutic exercises to develop strength, endurance, ROM, flexibility, posture and core stabilization for 45 minutes including:    UT stretch 2 x 30 sec  LS stretch 3 x 20 sec  Cervical Retract + extension w/towel x 10 5"  Seated chin tucks 10x3"   Seated chin tuck w/ mini rotation x10  Supine chin tucks 10x5"   scalene stretch 5x10" ea  Rows 90# 2x10x3" - NP  SOC + No money GTB 15x3"  Thoracic ext over FR 10x5"   Serratus press with shoulder flexion with ball at wall x 10 and YTB  Cat/cow x10   Supine pec stretch on half foam 2 min  Medex cervical extension 198# x15 (warm up at 120# x5 reps) (RPE 2-3/10) (Seat pos. 202) (increase resistance 10% next visit)  Supine alternating shoulder flexion x 20  Supine diagonals with GTB x 20  Serratus punches with 3# on half foam x 20  Open books x10  snow angels GTB x10   serratus wall slides + lift off GTB 10x3"  prone over PB:    W's 3# 10x3   Y's, T,s I,s 3# x3    NP:  cervical Rotational stretch w/towel x 10 5"     received the following manual therapy techniques: FDN, Myofacial release and Soft tissue Mobilization were applied to the: cervical spine for 10 minutes, including:    FDN: pt received trigger point dry needling of bilateral UT today.  (2) needles were inserted into trigger points in bilateral upper traps and were manipulated for 1-2 minutes each for a total of (4) needles.      received heat pack for 5 minutes to cervical area     Home Exercises Provided and Patient Education Provided     Education provided:   - cues with ex's  - Postural awareness    Written Home Exercises Provided: Patient instructed to cont prior HEP.     See EMR under Patient Instructions for exercises provided prior visit and today's visit.    Assessment   Pt presents today for discharge without complaints of pain, or headaches since last visit. Due to pts gains in strength, ROM, tolerance to " ADL's and functional mobility activities, pt will be discharged at this time. Pt received dry needling of bilateral upper traps again today with good tolerance and no adverse effects. Pt given discharge HEP to maintain gains that were made and to prevent return of symtpoms. D/C patient       Is progressing well towards his goals.   Pt prognosis is Good.     Pt will continue to benefit from skilled outpatient physical therapy to address the deficits listed in the problem list box on initial evaluation, provide pt/family education and to maximize pt's level of independence in the home and community environment.     Pt's spiritual, cultural and educational needs considered and pt agreeable to plan of care and goals.     Anticipated barriers to physical therapy: None    GOALS: Short Term Goals: 4 weeks  1.Report decreased in pain at worse less than  <   / =  5  /10  to increase tolerance for functional activities. MET  2. Pt to improve range of motion of cervical by 25% to allow for improved functional mobility to allow for improvement in IADLs.  MET  3. Increased cervical MMT 1/2  grade to increase tolerance for ADL and work activities. MET  4. Pt to report no more than one headache a week so that he may increase tolerance for ADL. MET  5. Pt to tolerate HEP to improve ROM and independence with ADL's. MET  6. Increased MMT  for lower traps/middle traps/rhomboids to > or = 4+/5 to increase tolerance for ADL and improve posture. MET     Long Term Goals: 8 weeks  1.Report decreased in pain at worse less than  <   / =  3  /10  to increase tolerance for functional activities. MET  2.Pt to improve range of motion by 75% to allow for improved functional mobility to allow for improvement in IADLs. MET  3.Increased cervical MMT  1 grade  to increase tolerance for ADL and work activities. MET  4.  Pt will report 27% or less on FOTO neck survey score for neck pain disability to demonstrate decrease in disability and  improvement in neck pain. MET  5. Pt to be Independent with HEP to improve ROM and independence with ADL's. MET  6. Increased MMT  for lower traps/middle traps/rhomboids to > or = 5/5 to increase tolerance for ADL and improve posture. (not met, progressing)       Plan     Plan of care Certification: 1/13/2022 to 5/29/2022.    Discharge patient from physical therapy at this time.     Darin Olivarez, PT

## 2022-06-26 ENCOUNTER — OFFICE VISIT (OUTPATIENT)
Dept: URGENT CARE | Facility: CLINIC | Age: 27
End: 2022-06-26
Payer: COMMERCIAL

## 2022-06-26 VITALS
TEMPERATURE: 97 F | BODY MASS INDEX: 27.77 KG/M2 | WEIGHT: 205 LBS | OXYGEN SATURATION: 97 % | DIASTOLIC BLOOD PRESSURE: 74 MMHG | SYSTOLIC BLOOD PRESSURE: 127 MMHG | HEART RATE: 79 BPM | HEIGHT: 72 IN | RESPIRATION RATE: 17 BRPM

## 2022-06-26 DIAGNOSIS — Z76.0 MEDICATION REFILL: Primary | ICD-10-CM

## 2022-06-26 PROCEDURE — 99213 PR OFFICE/OUTPT VISIT, EST, LEVL III, 20-29 MIN: ICD-10-PCS | Mod: S$GLB,,, | Performed by: PHYSICIAN ASSISTANT

## 2022-06-26 PROCEDURE — 99213 OFFICE O/P EST LOW 20 MIN: CPT | Mod: S$GLB,,, | Performed by: PHYSICIAN ASSISTANT

## 2022-06-26 PROCEDURE — 3074F SYST BP LT 130 MM HG: CPT | Mod: CPTII,S$GLB,, | Performed by: PHYSICIAN ASSISTANT

## 2022-06-26 PROCEDURE — 1160F RVW MEDS BY RX/DR IN RCRD: CPT | Mod: CPTII,S$GLB,, | Performed by: PHYSICIAN ASSISTANT

## 2022-06-26 PROCEDURE — 3078F DIAST BP <80 MM HG: CPT | Mod: CPTII,S$GLB,, | Performed by: PHYSICIAN ASSISTANT

## 2022-06-26 PROCEDURE — 3008F PR BODY MASS INDEX (BMI) DOCUMENTED: ICD-10-PCS | Mod: CPTII,S$GLB,, | Performed by: PHYSICIAN ASSISTANT

## 2022-06-26 PROCEDURE — 1159F PR MEDICATION LIST DOCUMENTED IN MEDICAL RECORD: ICD-10-PCS | Mod: CPTII,S$GLB,, | Performed by: PHYSICIAN ASSISTANT

## 2022-06-26 PROCEDURE — 3074F PR MOST RECENT SYSTOLIC BLOOD PRESSURE < 130 MM HG: ICD-10-PCS | Mod: CPTII,S$GLB,, | Performed by: PHYSICIAN ASSISTANT

## 2022-06-26 PROCEDURE — 1159F MED LIST DOCD IN RCRD: CPT | Mod: CPTII,S$GLB,, | Performed by: PHYSICIAN ASSISTANT

## 2022-06-26 PROCEDURE — 3008F BODY MASS INDEX DOCD: CPT | Mod: CPTII,S$GLB,, | Performed by: PHYSICIAN ASSISTANT

## 2022-06-26 PROCEDURE — 1160F PR REVIEW ALL MEDS BY PRESCRIBER/CLIN PHARMACIST DOCUMENTED: ICD-10-PCS | Mod: CPTII,S$GLB,, | Performed by: PHYSICIAN ASSISTANT

## 2022-06-26 PROCEDURE — 3078F PR MOST RECENT DIASTOLIC BLOOD PRESSURE < 80 MM HG: ICD-10-PCS | Mod: CPTII,S$GLB,, | Performed by: PHYSICIAN ASSISTANT

## 2022-06-26 RX ORDER — ALBUTEROL SULFATE 90 UG/1
1-2 AEROSOL, METERED RESPIRATORY (INHALATION) EVERY 6 HOURS PRN
Qty: 18 G | Refills: 2 | Status: SHIPPED | OUTPATIENT
Start: 2022-06-26 | End: 2023-06-26

## 2022-06-26 NOTE — PATIENT INSTRUCTIONS
- Rest.    - Drink plenty of fluids.    - Acetaminophen (tylenol) or Ibuprofen (advil,motrin) as directed as needed for fever/pain. Avoid tylenol if you have a history of liver disease. Do not take ibuprofen if you have a history of GI bleeding, kidney disease, or if you take blood thinners.     - Follow up with your PCP or specialty clinic as directed in the next 1-2 weeks if not improved or as needed.  You can call [phone] to schedule an appointment with the appropriate provider.    - Go to the ER or seek medical attention immediately if you develop new or worsening symptoms.     - You must understand that you have received an Urgent Care treatment only and that you may be released before all of your medical problems are known or treated.   - You, the patient, will arrange for follow up care as instructed.   - If your condition worsens or fails to improve we recommend that you receive another evaluation at the ER immediately or contact your PCP to discuss your concerns or return here.

## 2022-06-26 NOTE — PROGRESS NOTES
Subjective:       Patient ID: Baron CALEB Shannon is a 27 y.o. male.    Vitals:  height is 6' (1.829 m) and weight is 93 kg (205 lb). His tympanic temperature is 97.4 °F (36.3 °C). His blood pressure is 127/74 and his pulse is 79. His respiration is 17 and oxygen saturation is 97%.     Chief Complaint: Medication Refill    Pt is here today requesting a refill of his albuterol inhaler. Pt on daily controller, but ran out of albuterol rescue. Currently asymptomatic with no complaints just needs albuterol  Refill.   Pt states hs is traveling to the [place] and does not have any refills on his inhaler.    Medication Refill  This is a new problem. The current episode started today. The problem has been unchanged. Pertinent negatives include no abdominal pain, anorexia, arthralgias, change in bowel habit, chest pain, chills, congestion, coughing, diaphoresis, fatigue, fever, headaches, joint swelling, myalgias, nausea, neck pain, numbness, rash, sore throat, swollen glands, urinary symptoms, vertigo, visual change, vomiting or weakness. Nothing aggravates the symptoms. He has tried nothing for the symptoms.       Constitution: Negative for chills, sweating, fatigue and fever.   HENT: Negative for ear pain, congestion and sore throat.    Neck: Negative for neck pain.   Cardiovascular: Negative for chest pain.   Eyes: Negative for eye itching and eye pain.   Respiratory: Positive for asthma. Negative for cough, sputum production and shortness of breath.    Gastrointestinal: Negative for abdominal pain, nausea and vomiting.   Musculoskeletal: Negative for joint pain, joint swelling and muscle ache.   Skin: Negative for rash.   Allergic/Immunologic: Positive for asthma.   Neurological: Negative for history of vertigo, headaches and numbness.       Objective:      Physical Exam   Constitutional: He is oriented to person, place, and time. He appears well-developed.  Non-toxic appearance. He does not appear ill. No distress.   HENT:    Head: Normocephalic and atraumatic.   Ears:   Right Ear: External ear normal.   Left Ear: External ear normal.   Eyes: Conjunctivae are normal. Right eye exhibits no discharge. Left eye exhibits no discharge. No scleral icterus.   Pulmonary/Chest: Effort normal. No accessory muscle usage or stridor. No tachypnea and no bradypnea. No respiratory distress. He has no decreased breath sounds. He has no wheezes. He has no rhonchi. He has no rales.   Neurological: He is alert and oriented to person, place, and time.   Skin: Skin is not diaphoretic.   Psychiatric: His behavior is normal. Judgment and thought content normal.   Nursing note and vitals reviewed.        Assessment:       1. Medication refill          Plan:         Medication refill  -     albuterol (PROVENTIL/VENTOLIN HFA) 90 mcg/actuation inhaler; Inhale 1-2 puffs into the lungs every 6 (six) hours as needed for Wheezing. Rescue  Dispense: 18 g; Refill: 2      Patient Instructions   - Rest.    - Drink plenty of fluids.    - Acetaminophen (tylenol) or Ibuprofen (advil,motrin) as directed as needed for fever/pain. Avoid tylenol if you have a history of liver disease. Do not take ibuprofen if you have a history of GI bleeding, kidney disease, or if you take blood thinners.     - Follow up with your PCP or specialty clinic as directed in the next 1-2 weeks if not improved or as needed.  You can call [phone] to schedule an appointment with the appropriate provider.    - Go to the ER or seek medical attention immediately if you develop new or worsening symptoms.     - You must understand that you have received an Urgent Care treatment only and that you may be released before all of your medical problems are known or treated.   - You, the patient, will arrange for follow up care as instructed.   - If your condition worsens or fails to improve we recommend that you receive another evaluation at the ER immediately or contact your PCP to discuss your concerns  or return here.

## 2023-02-15 ENCOUNTER — PATIENT MESSAGE (OUTPATIENT)
Dept: DERMATOLOGY | Facility: CLINIC | Age: 28
End: 2023-02-15
Payer: COMMERCIAL

## 2023-05-03 ENCOUNTER — TELEPHONE (OUTPATIENT)
Dept: DERMATOLOGY | Facility: CLINIC | Age: 28
End: 2023-05-03
Payer: COMMERCIAL

## 2023-05-03 NOTE — TELEPHONE ENCOUNTER
----- Message from Mag Hamilton MA sent at 5/2/2023  3:39 PM CDT -----  Regarding: FW: appt  Contact: @[phone]    ----- Message -----  From: Mo Madison  Sent: 5/2/2023   3:33 PM CDT  To: [facility] Clinical Support Triage  Subject: appt                                             Patient is calling to schedule an appt. The patient has an appt for 9/7/2023 but would like to be seen a lot sooner. Please call and advise [phone]

## 2023-08-11 ENCOUNTER — HOSPITAL ENCOUNTER (EMERGENCY)
Facility: OTHER | Age: 28
Discharge: HOME | End: 2023-08-11
Attending: EMERGENCY MEDICINE
Payer: COMMERCIAL

## 2023-08-11 VITALS
WEIGHT: 210 LBS | HEIGHT: 72 IN | OXYGEN SATURATION: 99 % | SYSTOLIC BLOOD PRESSURE: 116 MMHG | RESPIRATION RATE: 18 BRPM | HEART RATE: 66 BPM | BODY MASS INDEX: 28.44 KG/M2 | DIASTOLIC BLOOD PRESSURE: 73 MMHG | TEMPERATURE: 97 F

## 2023-08-11 DIAGNOSIS — S39.012A BACK STRAIN, INITIAL ENCOUNTER: Primary | ICD-10-CM

## 2023-08-11 PROCEDURE — 99284 EMERGENCY DEPT VISIT MOD MDM: CPT

## 2023-08-11 PROCEDURE — 63600175 PHARM REV CODE 636 W HCPCS

## 2023-08-11 PROCEDURE — 96372 THER/PROPH/DIAG INJ SC/IM: CPT | Performed by: PHYSICIAN ASSISTANT

## 2023-08-11 PROCEDURE — 96372 THER/PROPH/DIAG INJ SC/IM: CPT

## 2023-08-11 PROCEDURE — 25000003 PHARM REV CODE 250: Performed by: EMERGENCY MEDICINE

## 2023-08-11 PROCEDURE — 63600175 PHARM REV CODE 636 W HCPCS: Performed by: PHYSICIAN ASSISTANT

## 2023-08-11 RX ORDER — ORPHENADRINE CITRATE 30 MG/ML
30 INJECTION INTRAMUSCULAR; INTRAVENOUS
Status: COMPLETED | OUTPATIENT
Start: 2023-08-11 | End: 2023-08-11

## 2023-08-11 RX ORDER — LIDOCAINE 50 MG/G
1 PATCH TOPICAL DAILY
Qty: 5 PATCH | Refills: 0 | Status: SHIPPED | OUTPATIENT
Start: 2023-08-11 | End: 2023-08-16

## 2023-08-11 RX ORDER — LIDOCAINE 50 MG/G
1 PATCH TOPICAL
Status: DISCONTINUED | OUTPATIENT
Start: 2023-08-11 | End: 2023-08-11

## 2023-08-11 RX ORDER — METHOCARBAMOL 500 MG/1
500 TABLET, FILM COATED ORAL 3 TIMES DAILY PRN
Qty: 30 TABLET | Refills: 0 | Status: SHIPPED | OUTPATIENT
Start: 2023-08-11 | End: 2023-08-21

## 2023-08-11 RX ORDER — LIDOCAINE 50 MG/G
1 PATCH TOPICAL
Status: DISCONTINUED | OUTPATIENT
Start: 2023-08-11 | End: 2023-08-11 | Stop reason: HOSPADM

## 2023-08-11 RX ORDER — IBUPROFEN 800 MG/1
800 TABLET ORAL EVERY 6 HOURS PRN
Qty: 20 TABLET | Refills: 0 | Status: SHIPPED | OUTPATIENT
Start: 2023-08-11

## 2023-08-11 RX ORDER — KETOROLAC TROMETHAMINE 30 MG/ML
30 INJECTION, SOLUTION INTRAMUSCULAR; INTRAVENOUS
Status: COMPLETED | OUTPATIENT
Start: 2023-08-11 | End: 2023-08-11

## 2023-08-11 RX ADMIN — ORPHENADRINE CITRATE 30 MG: 60 INJECTION INTRAMUSCULAR; INTRAVENOUS at 11:08

## 2023-08-11 RX ADMIN — KETOROLAC TROMETHAMINE 30 MG: 30 INJECTION, SOLUTION INTRAMUSCULAR; INTRAVENOUS at 12:08

## 2023-08-11 RX ADMIN — LIDOCAINE 1 PATCH: 50 PATCH TOPICAL at 12:08

## 2023-08-11 NOTE — FIRST PROVIDER EVALUATION
Emergency Department TeleTriage Encounter Note      CHIEF COMPLAINT    Chief Complaint   Patient presents with    Back Pain     C/o mid back pain 10/10 s/t carrying 20 lb plant pot this am. Denies any recent trauma/injury. -PMH. VSS       VITAL SIGNS   Initial Vitals [08/11/23 1029]   BP Pulse Resp Temp SpO2   139/65 73 18 97.4 °F (36.3 °C) 97 %      MAP       --            ALLERGIES    Review of patient's allergies indicates:   Allergen Reactions    Shellfish containing products        PROVIDER TRIAGE NOTE  Patient presents with severe back pain after lifting and pot. No radicular symptoms.       ORDERS  Labs Reviewed - No data to display    ED Orders (720h ago, onward)      None              Virtual Visit Note: The provider triage portion of this emergency department evaluation and documentation was performed via [employer], a HIPAA-compliant telemedicine application, in concert with a tele-presenter in the room. A face to face patient evaluation with one of my colleagues will occur once the patient is placed in an emergency department room.      DISCLAIMER: This note was prepared with M*Early[employer] voice recognition transcription software. Garbled syntax, mangled pronouns, and other bizarre constructions may be attributed to that software system.

## 2023-08-11 NOTE — ED TRIAGE NOTES
Pt arrived with c/o sudden onset of mid back pain s/p carrying a 20 lbs plant pot this AM.  Pt states he took Ibuprofen with no relief.  Pt states pain is worse with movement, non-radiating.  Pt endorses tingling to bilateral hands, denies any other numbness or tingling.  Pt is ambulatory, but reports pain with walking, denies any incontinence.  Pt answering questions appropriately, speaking in complete sentences, respirations even and unlabored.  Aao x 4.

## 2023-08-11 NOTE — ED PROVIDER NOTES
"Encounter Date: 8/11/2023       History     Chief Complaint   Patient presents with    Back Pain     C/o mid back pain 10/10 s/t carrying 20 lb plant pot this am. Denies any recent trauma/injury. -PMH. VSS     Baron CALEB Shannon is a 28 y.o. male presenting to the emergency department for evaluation of mid back pain s/p carrying an approximately 20 pound plant pot up the stairs this morning. He reports sharp pain in the mid thoracic back that is worse with movement and ambulation. States the he did injure the same area 2 weeks ago when he was moving a sofa. He is experiencing some "tingling" in his fingertips. He denies numbness. He has been able to ambulate.  He denies urinary retention, bladder or bowel incontinence or saddle anesthesia. No history of back surgery. No other complaints at this time.       The history is provided by the patient (Mother at bedside).     Review of patient's allergies indicates:   Allergen Reactions    Shellfish containing products      Past Medical History:   Diagnosis Date    Accidental poisoning by second-hand tobacco smoke(E869.4)     ADHD (attention deficit hyperactivity disorder)     Allergy     Asthma, not well controlled     Asthma, well controlled     Atopy     IgE 400, immuncap positive for multiple aeroallergens    Eczema     eczema is well controlled    Patient non adherence     Rhinitis, allergic      Past Surgical History:   Procedure Laterality Date    Nasal polyposis Bilateral     NASAL SINUS SURGERY  01/09/2018     Family History   Problem Relation Age of Onset    Asthma Brother     Eczema Brother     Allergic rhinitis Brother     Diabetes Maternal Grandfather     Obesity Maternal Grandfather     No Known Problems Mother     No Known Problems Father      Social History     Tobacco Use    Smoking status: Never    Smokeless tobacco: Never    Tobacco comments:     Room mate   Substance Use Topics    Alcohol use: Yes     Alcohol/week: 0.0 standard drinks of alcohol     " Comment: Socially    Drug use: No     Review of Systems   Constitutional:  Negative for chills and fever.   HENT:  Negative for congestion, rhinorrhea and sore throat.    Respiratory:  Negative for cough and shortness of breath.    Cardiovascular:  Negative for chest pain.   Gastrointestinal:  Negative for abdominal pain, diarrhea, nausea and vomiting.   Genitourinary:  Negative for dysuria, frequency and urgency.   Musculoskeletal:  Positive for back pain.   Skin:  Negative for rash.   Neurological:  Negative for dizziness and headaches.        Positive for tingling in finger tips.    Psychiatric/Behavioral:  Negative for confusion.        Physical Exam     Initial Vitals [08/11/23 1029]   BP Pulse Resp Temp SpO2   139/65 73 18 97.4 °F (36.3 °C) 97 %      MAP       --         Physical Exam    Vitals reviewed.  Constitutional: He appears well-developed and well-nourished. No distress.   HENT:   Head: Normocephalic and atraumatic.   Eyes: Conjunctivae and EOM are normal.   Neck: Neck supple.   Normal range of motion.  Cardiovascular:  Normal rate, regular rhythm, normal heart sounds and intact distal pulses.           Pulmonary/Chest: Breath sounds normal. No respiratory distress. He has no wheezes. He has no rhonchi. He has no rales. He exhibits no tenderness.   Musculoskeletal:         General: No edema. Normal range of motion.      Cervical back: Normal range of motion and neck supple.      Comments: No C, T, or L midline or paraspinal tenderness to palpation, crepitus, step-offs, or deformities.  Full range of motion of bilateral upper and lower extremities.     Neurological: He is alert and oriented to person, place, and time. He has normal strength. No cranial nerve deficit or sensory deficit. GCS score is 15. GCS eye subscore is 4. GCS verbal subscore is 5. GCS motor subscore is 6.   Sensation intact to light touch.  Neurovascularly intact.   Skin: Skin is warm and dry.   No overlying skin changes.    Psychiatric: He has a normal mood and affect. His behavior is normal. Judgment and thought content normal.         ED Course   Procedures  Labs Reviewed - No data to display       Imaging Results              X-Ray Thoracic Spine AP Lateral (Final result)  Result time 08/11/23 13:26:20      Final result by Anitra Baker MD (08/11/23 13:26:20)                   Impression:      No acute osseous abnormality seen.      Electronically signed by: Anitra Baker  Date:    08/11/2023  Time:    13:26               Narrative:    EXAMINATION:  XR THORACIC SPINE AP LATERAL    CLINICAL HISTORY:  thoracic back pain;    TECHNIQUE:  AP and lateral views of the thoracic spine were performed.    COMPARISON:  None    FINDINGS:  Vertebral body heights and disc spaces are maintained.  AP alignment is anatomic.  Levoconvex curvature lower thoracic spine which may be partly positional.                                       Medications   orphenadrine injection 30 mg (30 mg Intramuscular Given 8/11/23 1147)   ketorolac injection 30 mg (30 mg Intramuscular Given 8/11/23 1234)     Medical Decision Making:   Initial Assessment:   Urgent evaluation a 28-year-old male who presents with midline thoracic back pain after picking up a 20 lb pot and carrying it up the stairs this morning.  States that he also injured his back 2 weeks ago when picking up a sofa.  No pain relief with ibuprofen.  On exam, he does appear uncomfortable but nontoxic.  No midline tenderness to palpation on exam.  Sensation intact to light touch. Neurovascularly intact.  Will obtain thoracic spine film.  Will give anti-inflammatory, muscle relaxer, and lidocaine patch.  Clinical Tests:   Radiological Study: Ordered and Reviewed  ED Management:  On review of x-ray, there is no fracture or subluxation.  I updated the patient and his mother on on results.  He reports his pain has improved after receiving medications. Will discharge home with anti-inflammatory, muscle  relaxer, and lidocaine patches.  Will also provide ambulatory referral to orthopedics and physical therapy.  I advised the patient to take it easy for the next few days.  Also recommended heating pads and ice packs.  Patient verbalized understanding and agreement with this plan of care. He was given specific return precautions. Advised to follow up with PCP as needed. All questions and concerns addressed. He is stable for discharge.                           Clinical Impression:   Final diagnoses:  [S39.012A] Back strain, initial encounter (Primary)        ED Disposition Condition    Discharge Stable          ED Prescriptions       Medication Sig Dispense Start Date End Date Auth. Provider    ibuprofen (ADVIL,MOTRIN) 800 MG tablet Take 1 tablet (800 mg total) by mouth every 6 (six) hours as needed for Pain. 20 tablet 8/11/2023 -- Esvin Roman PA-C    methocarbamoL (ROBAXIN) 500 MG Tab Take 1 tablet (500 mg total) by mouth 3 (three) times daily as needed (for pain and spasms). 30 tablet 8/11/2023 8/21/2023 Esvin Roman PA-C    LIDOcaine (LIDODERM) 5 % Place 1 patch onto the skin once daily. Remove & Discard patch within 12 hours or as directed by MD for 5 days 5 patch 8/11/2023 8/16/2023 Esvin Roman PA-C          Follow-up Information    None          Esvin Roman PA-C  08/12/23 [id number]

## 2023-08-11 NOTE — ED NOTES
Tried to ambulate pt, pt still feels like cannot walk without it being too painful. Provider notified.

## 2023-08-14 ENCOUNTER — TELEPHONE (OUTPATIENT)
Dept: ORTHOPEDICS | Facility: CLINIC | Age: 28
End: 2023-08-14

## 2024-03-06 NOTE — PLAN OF CARE
Outpatient Therapy Updated Plan of Care     Visit Date: 3/22/2022    Name: Baron CALEB Shannon  [facility] Number: [id number]    Therapy Diagnosis:   Encounter Diagnoses   Name Primary?    Decreased range of motion of neck Yes    Neck muscle weakness     Poor posture      Physician: Hola Terry MD    Physician Orders: PT Eval and Treat   Medical Diagnosis from Referral: M50.30 (ICD-10-CM) - DDD (degenerative disc disease), cervical  Evaluation Date: 1/13/2022  Authorization Period Expiration: 12/31/2022  Plan of Care Expiration: 4/26/2022  Visit # / Visits authorized: 8/ 20  Progress Note Due: 4/22/2022  FOTO: 2/2    Precautions: Standard    Subjective     Update: Pt reports: he has only had one headache since last visit, but it was of decreased intensity     He was compliant with home exercise program.  Response to previous treatment: Mild soreness  Functional change: fewer and less intense headaches     Pain: 0/10  Location: bilateral cervical paraspinals, ELLIS    Objective     Update:        Cervical Range of Motion:     Degrees Pain   Flexion 45 -      Extension 68 -      Right Side Bending 46 -      Left Side Bending 45 -      Right Rotation 72 -   Left Rotation 70 -      Upper Extremity Strength  (R) UE   (L) UE     Shoulder elevation: 5/5 Shoulder elevation: 5/5   Shoulder flexion: 5/5 Shoulder flexion: 5/5   Shoulder Abduction: 5/5 Shoulder abduction: 5/5   Shoulder ER 5/5 Shoulder ER 5/5   Shoulder IR 5/5 Shoulder IR 5/5   Elbow flexion: 5/5 Elbow flexion: 5/5   Elbow extension: 5/5 Elbow extension: 5/5   Wrist flexion: 5/5 Wrist flexion: 5/5   Wrist extension: 5/5 Wrist extension: 5/5    5/5 : 5/5   Lower Trap 4+/5 Lower Trap 4+/5   Middle Trap 4+/5 Middle Trap 4+/5   Rhomboids 4+/5 Rhomboids 4+/5      Strength:  Cervical MMT   Flexion 5/5   Extension 5/5   Right Side Bend 5/5   Left Side Bend 5/5                 Assessment     Update: Pt presents today without complaints of pain. Reassessment  "Subjective   77 y.o. female with PMH HTN, HLD, COPD, 3/1 s/p MIS L4-L5 TLIF.  Admitted 3/5/2024 with SAH (subarachnoid hemorrhage) (CMS/HCC) after presenting with AMS, CTH trace R sylvian SAH.  Admitted to [facility] for further neurosurgical management.    Interval Events: No acute events overnight.     Objective   Vitals 24 hour ranges:  Heart Rate:  []   Temp:  [36.4 °C (97.5 °F)-37 °C (98.6 °F)]   Resp:  [19-30]   BP: (105-136)/(40-57)   Height:  [157 cm (5' 1.81\")]   Weight:  [69.6 kg (153 lb 7 oz)]   SpO2:  [95 %-100 %]      Physical Exam:  NEURO:  Drowsy, eyes open to voice. AOx2-3  Grossly tracks  +FC x4 with persistence (volitional), AG   Posterior spinal surgical site c/d/i with sutures  CV:  RRR on telemetry, NSR  RESP:  Regular, unlabored  On RA  :  Incontinent   GI:  Abdomen NT/ND, soft  SKIN:  Intact    Medications  Scheduled: PRN: Continuous:   dexAMETHasone, 2 mg, intravenous, q8h HORTENSIA  [START ON 3/7/2024] heparin (porcine), 5,000 Units, subcutaneous, q8h  levETIRAcetam, 500 mg, intravenous, q12h  niMODipine, 60 mg, oral, q4h   Or  niMODipine, 60 mg, oral, q4h  sennosides-docusate sodium, 2 tablet, oral, BID     PRN medications: hydrALAZINE, labetaloL, niCARdipine niCARdipine, 1-15 mg/hr  sodium chloride 0.9%, 75 mL/hr, Last Rate: 75 mL/hr (03/06/24 0600)         Lab Results  Results from last 72 hours   Lab Units 03/06/24  0014   GLUCOSE mg/dL 83   SODIUM mmol/L 130*   POTASSIUM mmol/L 4.0   CHLORIDE mmol/L 94*   CO2 mmol/L 23   ANION GAP mmol/L 17   BUN mg/dL 12   CREATININE mg/dL 0.55   EGFR mL/min/1.73m*2 >90   CALCIUM mg/dL 9.0   PHOSPHORUS mg/dL 3.4   ALBUMIN g/dL 3.5   MAGNESIUM mg/dL 2.10      Results from last 72 hours   Lab Units 03/06/24  0014   WBC AUTO x10*3/uL 16.6*   NRBC AUTO /100 WBCs 0.1*   RBC AUTO x10*6/uL 4.17   HEMOGLOBIN g/dL 11.3*   HEMATOCRIT % 35.6*   MCV fL 85   MCH pg 27.1   MCHC g/dL 31.7*   RDW % 14.7*   PLATELETS AUTO x10*3/uL 319       Results from last 72 hours   Lab " Units 03/06/24  0336 03/06/24  0014   PROTIME seconds 13.9* 14.4*   INR  1.2* 1.3*   APTT seconds 20* 29        Imaging Results  CT angio head w and wo IV contrast    Result Date: 3/5/2024  EXAMINATION: CTA OF THE HEAD WITHOUT AND WITH CONTRAST 3/5/2024 10:01 am TECHNIQUE: CTA of the head/brain was performed without and with the administration of intravenous contrast. Multiplanar reformatted images are provided for review. MIP images are provided for review. Automated exposure control, iterative reconstruction, and/or weight based adjustment of the mA/kV was utilized to reduce the radiation dose to as low as reasonably achievable. COMPARISON: None HISTORY: ORDERING SYSTEM PROVIDED HISTORY: r/o CVA TECHNOLOGIST PROVIDED HISTORY: Reason for exam:->r/o CVA Additional Contrast?->Radiologist Recommendation What reading provider will be dictating this exam?->CRC FINDINGS: CT HEAD: BRAIN/VENTRICLES: There is hyperdense material in the arm region of the right operculum and the right insula consistent with acute subdural hemorrhage.  There is no significant mass effect. Grey-white differentiation is maintained.  No evidence of mass, mass effect or midline shift. No evidence of hydrocephalus. ORBITS: The visualized portion of the orbits demonstrate no acute abnormality. SINUSES:  The visualized paranasal sinuses and mastoid air cells demonstrate no acute abnormality. SOFT TISSUES/SKULL: No acute abnormality of the visualized skull or soft tissues. CTA HEAD: ANTERIOR CIRCULATION: No significant stenosis of the intracranial internal carotid, anterior cerebral, or middle cerebral arteries. No aneurysm. POSTERIOR CIRCULATION: No significant stenosis of the vertebral, basilar, or posterior cerebral arteries. No aneurysm. OTHER: No dural venous sinus thrombosis on this non-dedicated study.    1. There is hyperdense material in the right insula and operculum consistent with a small amount of acute subdural hemorrhage. 2.  performed with pt demo improved cervical ROM and strength as well as improved periscapular strength. Dry needling applied to cervical paraspinals to decrease tightness and headaches, pt with good tolerance and no adverse effects. Continue to progress as tolerated.             Is progressing well towards his goals.   Pt prognosis is Good.      Pt will continue to benefit from skilled outpatient physical therapy to address the deficits listed in the problem list box on initial evaluation, provide pt/family education and to maximize pt's level of independence in the home and community environment.      Pt's spiritual, cultural and educational needs considered and pt agreeable to plan of care and goals.     Anticipated barriers to physical therapy: None     GOALS: Short Term Goals: 4 weeks  1.Report decreased in pain at worse less than  <   / =  5  /10  to increase tolerance for functional activities. On going  2. Pt to improve range of motion of cervical by 25% to allow for improved functional mobility to allow for improvement in IADLs.  MET  3. Increased cervical MMT 1/2  grade to increase tolerance for ADL and work activities. MET  4. Pt to report no more than one headache a week so that he may increase tolerance for ADL. MET  5. Pt to tolerate HEP to improve ROM and independence with ADL's. MET  6. Increased MMT  for lower traps/middle traps/rhomboids to > or = 4+/5 to increase tolerance for ADL and improve posture. MET     Long Term Goals: 8 weeks  1.Report decreased in pain at worse less than  <   / =  3  /10  to increase tolerance for functional activities. On going  2.Pt to improve range of motion by 75% to allow for improved functional mobility to allow for improvement in IADLs. MET  3.Increased cervical MMT  1 grade  to increase tolerance for ADL and work activities. MET  4.  Pt will report 27% or less on FOTO neck survey score for neck pain disability to demonstrate decrease in disability and  "improvement in neck pain.On going  5. Pt to be Independent with HEP to improve ROM and independence with ADL's. On going  6. Increased MMT  for lower traps/middle traps/rhomboids to > or = 5/5 to increase tolerance for ADL and improve posture. On going     Reasons for Recertification of Therapy:   To allow patient to complete full allotment of visits so that they may achieve maximum therapeutic benefit      Plan     Updated Certification Period: 3/22/2022 to 4/26/2022  Recommended Treatment Plan: 1 times per week for 6 weeks: Cervical/Lumbar Traction, Manual Therapy, Moist Heat/ Ice, Neuromuscular Re-ed, Patient Education, Self Care, Therapeutic Activities and Therapeutic Exercise  Other Recommendations:      - Patient education  - Therapeutic exercise  - Manual therapy  - Performance testing   - Neuromuscular Re-education  - Therapeutic activity   - Modalities    Pt may be seen by PTA as part of the rehabilitation team.     Therapist: Darin Olivarez, PT  3/25/2022    "I certify the need for these services furnished under this plan of treatment and while under my care."    ____________________________________  Physician/Referring Practitioner    _______________  Date of Signature    Darin Olivarez, PT  3/22/2022      I CERTIFY THE NEED FOR THESE SERVICES FURNISHED UNDER THIS PLAN OF TREATMENT AND WHILE UNDER MY CARE    Physician's comments:        Physician's Signature: ___________________________________________________      " Unremarkable CTA of the head.    CT lumbar spine wo IV contrast    Result Date: 3/2/2024  EXAMINATION: CT OF THE LUMBAR SPINE WITHOUT CONTRAST  3/2/2024 TECHNIQUE: CT of the lumbar spine was performed without the administration of intravenous contrast. Multiplanar reformatted images are provided for review. Adjustment of mA and/or kV according to patient size was utilized.  Automated exposure control, iterative reconstruction, and/or weight based adjustment of the mA/kV was utilized to reduce the radiation dose to as low as reasonably achievable. COMPARISON: None HISTORY: ORDERING SYSTEM PROVIDED HISTORY: pain TECHNOLOGIST PROVIDED HISTORY: Reason for exam:->pain What reading provider will be dictating this exam?->CRC FINDINGS: BONES/ALIGNMENT: There findings of recent posterior metallic fusion at L4-5. Interbody disc spacer/fusion device at L4-5 appears to be in appropriate position.  There is evidence of a partial facetectomy on the left at L4.  No acute compression fractures detected.  No osseous narrowing of the canal. Significant streak artifact is caused by the hardware. DEGENERATIVE CHANGES: Minimal facet arthropathy at L3-4 bilaterally and L5-S1 on the right. SOFT TISSUES/RETROPERITONEUM: No paraspinal mass is seen.  No abnormal paraspinous or posterior soft tissue fluid collections.    1. Findings of recent posterior metallic fusion at L4-5.  No CT evidence of acute complication. 2. No acute compression fractures detected. 3. No osseous narrowing of the canal. 4. Minimal degenerative changes.    FLUORO FOR SURGICAL PROCEDURES    Result Date: 3/1/2024  EXAMINATION: SPOT FLUOROSCOPIC IMAGES 3/1/2024 6:43 am TECHNIQUE: Fluoroscopy was provided by the radiology department for procedure. Radiologist was not present during examination. FLUOROSCOPY DOSE AND TYPE: Radiation Exposure Index: Fluoroscopy time equals 1.7 minutes.  Total dose equals 112.98 mGy, COMPARISON: None HISTORY: ORDERING SYSTEM PROVIDED HISTORY:  pain TECHNOLOGIST PROVIDED HISTORY: Reason for exam:->pain What reading provider will be dictating this exam?->CRC Intraprocedural imaging. FINDINGS: 3 adopt spot images were obtained.    Intraprocedural fluoroscopic spot images as above.  See separate procedure report for more information.     Assessment/Plan   77 y.o. female with PMH HTN, HLD, COPD. 3/1 s/p MIS L4-L5 TLIF. Admitted 3/5/2024 with SAH (subarachnoid hemorrhage) (CMS/HCC) after presenting with AMS, CTH trace R sylvian SAH. Patient admitted to NSU for further neurosurgical management.     NEURO:  #R sylvian SAH, encephalopathy  #s/p MIS L4-L5 TLIF (3/1/24)  Assessment:  Neurologically: A&O x 2-3, volitional exam, no focal deficits.  Posterior spinal surgical site c/d/i with sutures  3/5: CTA negative for aneurysm  Plan:  [facility]  Q1H neuro checks  Dex 2q8  Keppra 500mg BID  Nimodipine 60q4  Pain: acetaminophen, oxycodone, hydromorphone PRN  Nausea: ondansetron PRN  PT/OT  F/u neurosurgery re: need for angio  [] MRI L spine to check for CSF leak  [] SLP for formal swallow evaluation    CARDIOVASCULAR:  #HTN, HLD  Assessment:  SR on tele   Plan:  Continue to monitor on telemetry  Goal SBP < 140 - Nicardipine, Hydralazine and Labetalol PRN    RESPIRATORY:  #COPD  Assessment:  On RA  Plan:  Continuous pulse oximetry   O2 PRN to maintain SpO2 > 94%, wean as tolerated  Incentive spirometry while awake     RENAL/:  Assessment:  BUN/Cr at OSH: 9/0.60  Results from last 7 days   Lab Units 03/06/24  0014   BUN mg/dL 12   CREATININE mg/dL 0.55   Plan:  Monitor with daily RFP  Bladder scan and straight cath per ICU protocol     FEN/GI:  #Hyponatremia (Acute on Chronic)  Assessment:  Last BM: PTA   Na at OSH: 131, baseline 135  Results from last 7 days   Lab Units 03/06/24  0014   SODIUM mmol/L 130*   Plan:  Monitor and replace electrolytes per protocol  Diet: NPO  IVF: NS @ 75 mL/hr  Bowel Regimen: Docusate-Senna    ENDOCRINE:  Assessment:  BG: <150  Plan:  Add  Accuchecks & ISS Q6H if BG>150  Hypoglycemia protocol     HEMATOLOGY:  #Mild anemia  Assessment:  H/H: 12.4/38.2  PLT: 363  Results from last 7 days   Lab Units 24  0014   HEMOGLOBIN g/dL 11.3*   HEMATOCRIT % 35.6*   PLATELETS AUTO x10*3/uL 319   Plan:  Continue to monitor with daily CBC and Coag panel    INFECTIOUS DISEASE:  #Leukocytosis, likely reactive   Assessment:  WBC: 17.0  Results from last 7 days   Lab Units 24  0014   WBC AUTO x10*3/uL 16.6*   Temp (24hrs), Av.7 °C (98.1 °F), Min:36.4 °C (97.5 °F), Max:37 °C (98.6 °F)  Plan:  Continue to monitor for s/sx of infection  Pan culture for temperature > 38.4 C  [] UA with reflex culture 3/6    MUSCULOSKELETAL:  No acute issues    SKIN:  No acute issues  Turns and skin care per NSU protocol    ACCESS:  PIVs     PROPHYLAXIS:  DVT/VTE: SCDs, SQH starting 3/7  GI: not indicated     RESTRAINTS:  Not indicated/Patient does not meet criteria for restraints    Elizabeth Perez, APRN-CNP  Neuroscience Intensive Care       Total critical care time of 60 minutes, with > 50% of time spent in direct contact with patient/family for education, counseling and coordination of care.

## 2024-06-03 ENCOUNTER — OFFICE VISIT (OUTPATIENT)
Dept: URGENT CARE | Facility: CLINIC | Age: 29
End: 2024-06-03
Payer: COMMERCIAL

## 2024-06-03 VITALS
BODY MASS INDEX: 28.44 KG/M2 | RESPIRATION RATE: 20 BRPM | HEIGHT: 72 IN | SYSTOLIC BLOOD PRESSURE: 124 MMHG | TEMPERATURE: 97 F | WEIGHT: 210 LBS | OXYGEN SATURATION: 99 % | HEART RATE: 66 BPM | DIASTOLIC BLOOD PRESSURE: 70 MMHG

## 2024-06-03 DIAGNOSIS — Z76.0 MEDICATION REFILL: ICD-10-CM

## 2024-06-03 DIAGNOSIS — J45.909 ASTHMA, UNSPECIFIED ASTHMA SEVERITY, UNSPECIFIED WHETHER COMPLICATED, UNSPECIFIED WHETHER PERSISTENT: ICD-10-CM

## 2024-06-03 DIAGNOSIS — D22.9 BENIGN MOLE: ICD-10-CM

## 2024-06-03 DIAGNOSIS — J45.30 MILD PERSISTENT ASTHMA WITHOUT COMPLICATION: Primary | Chronic | ICD-10-CM

## 2024-06-03 PROCEDURE — 99213 OFFICE O/P EST LOW 20 MIN: CPT | Mod: S$GLB,,,

## 2024-06-03 RX ORDER — ALBUTEROL SULFATE 90 UG/1
2 AEROSOL, METERED RESPIRATORY (INHALATION) EVERY 4 HOURS PRN
Qty: 18 G | Refills: 0 | Status: SHIPPED | OUTPATIENT
Start: 2024-06-03

## 2024-06-03 RX ORDER — MONTELUKAST SODIUM 10 MG/1
10 TABLET ORAL NIGHTLY
Qty: 30 TABLET | Refills: 0 | Status: SHIPPED | OUTPATIENT
Start: 2024-06-03

## 2024-06-03 RX ORDER — BUDESONIDE AND FORMOTEROL FUMARATE DIHYDRATE 160; 4.5 UG/1; UG/1
2 AEROSOL RESPIRATORY (INHALATION) EVERY 12 HOURS
Qty: 10.2 G | Refills: 11 | Status: SHIPPED | OUTPATIENT
Start: 2024-06-03

## 2024-06-03 NOTE — PROGRESS NOTES
Subjective:      Patient ID: Baron CALEB Shannon is a 29 y.o. male.    Vitals:  height is 6' (1.829 m) and weight is 95.3 kg (210 lb). His oral temperature is 97 °F (36.1 °C). His blood pressure is 124/70 and his pulse is 66. His respiration is 20 and oxygen saturation is 99%.     Chief Complaint: Medication Refill and referral    29-year-old male presents to the clinic today with chief complaint of refill of inhaler and singular, referral for dermatology for a mole, pulmonology referral for asthma, and pcp referral. He states he has been out for 3 months of medication.   Denies any recent ill exposures. Denies any recent travel.  He does have a history of seasonal allergies. He does have a hx of asthma. Denies numbness or tingling. Denies radiation of pain. Denies fever, chills, body aches, chest pain, shortness of breath, wheezing, abdominal pain, nausea, vomiting, diarrhea, or rashes.                    Medication Refill  This is a chronic problem. The current episode started 1 to 4 weeks ago. The problem occurs constantly. The problem has been gradually worsening. Pertinent negatives include no abdominal pain, chest pain, chills, congestion, coughing, diaphoresis, fatigue, fever, headaches, myalgias, nausea, neck pain, rash, sore throat or vomiting.       Constitution: Negative for activity change, appetite change, chills, sweating, fatigue, fever and generalized weakness.   HENT:  Negative for ear pain, ear discharge, foreign body in ear, tinnitus, hearing loss, facial swelling, congestion, nosebleeds, foreign body in nose, postnasal drip, sinus pain, sinus pressure, sore throat, trouble swallowing and voice change.    Neck: Negative for neck pain, neck stiffness and neck swelling.   Cardiovascular:  Negative for chest pain, leg swelling, palpitations and sob on exertion.   Eyes:  Negative for eye discharge, eye itching, eye pain and eye redness.   Respiratory:  Positive for asthma. Negative for chest tightness,  cough, sputum production, shortness of breath and wheezing.    Gastrointestinal:  Negative for abdominal pain, nausea, vomiting, constipation and diarrhea.   Genitourinary:  Negative for dysuria, frequency, urgency, urine decreased, flank pain and hematuria.   Musculoskeletal:  Negative for pain, pain with walking and muscle ache.   Skin:  Negative for rash, erythema and bruising.   Allergic/Immunologic: Positive for environmental allergies, seasonal allergies and asthma. Negative for food allergies, chronic cough, sneezing and flu shot.   Neurological:  Negative for dizziness, light-headedness, passing out, coordination disturbances, loss of balance, headaches, disorientation and altered mental status.   Psychiatric/Behavioral:  Negative for altered mental status, disorientation, confusion, agitation and nervous/anxious. The patient is not nervous/anxious.       Objective:     Physical Exam   Constitutional: He is oriented to person, place, and time. He appears well-developed.   HENT:   Head: Normocephalic and atraumatic. Head is without abrasion, without contusion and without laceration.   Ears:   Right Ear: Hearing and external ear normal.   Left Ear: Hearing and external ear normal.   Ears:    Nose: Nose normal.   Mouth/Throat: Oropharynx is clear and moist and mucous membranes are normal.   Eyes: Conjunctivae, EOM and lids are normal. Pupils are equal, round, and reactive to light.   Neck: Trachea normal and phonation normal. Neck supple.   Cardiovascular: Normal rate, regular rhythm and normal heart sounds.   Pulmonary/Chest: Effort normal. No stridor. No respiratory distress. He has no decreased breath sounds. He has wheezes in the left upper field and the left middle field. He has no rhonchi. He has no rales.   Abdominal: Bowel sounds are normal.   Musculoskeletal: Normal range of motion.         General: Normal range of motion.   Neurological: He is alert and oriented to person, place, and time.   Skin:  Skin is warm, dry, intact and no rash. Capillary refill takes less than 2 seconds. No abrasion, No burn, No bruising, No erythema and No ecchymosis   Psychiatric: His speech is normal and behavior is normal. Judgment and thought content normal.   Nursing note and vitals reviewed.      Assessment:     1. Mild persistent asthma with frequent use of beta agonist    2. Medication refill    3. Benign mole    4. Asthma, unspecified asthma severity, unspecified whether complicated, unspecified whether persistent        Plan:       Mild persistent asthma with frequent use of beta agonist  -     Ambulatory referral/consult to Pulmonology  -     Ambulatory referral/consult to [facility]  -     albuterol (PROAIR HFA) 90 mcg/actuation inhaler; Inhale 2 puffs into the lungs every 4 (four) hours as needed for Wheezing or Shortness of Breath. Rescue  Dispense: 18 g; Refill: 0  -     mometasone-formoterol (DULERA) 200-5 mcg/actuation inhaler; Inhale 2 puffs into the lungs 2 (two) times daily.  Dispense: 13 g; Refill: 0  -     budesonide-formoterol 160-4.5 mcg (SYMBICORT) 160-4.5 mcg/actuation HFAA; Inhale 2 puffs into the lungs every 12 (twelve) hours. Controller  Dispense: 10.2 g; Refill: 11  -     montelukast (SINGULAIR) 10 mg tablet; Take 1 tablet (10 mg total) by mouth every evening.  Dispense: 30 tablet; Refill: 0    Medication refill  -     Ambulatory referral/consult to Pulmonology  -     Ambulatory referral/consult to [facility]  -     albuterol (PROAIR HFA) 90 mcg/actuation inhaler; Inhale 2 puffs into the lungs every 4 (four) hours as needed for Wheezing or Shortness of Breath. Rescue  Dispense: 18 g; Refill: 0  -     mometasone-formoterol (DULERA) 200-5 mcg/actuation inhaler; Inhale 2 puffs into the lungs 2 (two) times daily.  Dispense: 13 g; Refill: 0  -     budesonide-formoterol 160-4.5 mcg (SYMBICORT) 160-4.5 mcg/actuation HFAA; Inhale 2 puffs into the lungs every 12 (twelve) hours. Controller  Dispense:  10.2 g; Refill: 11  -     montelukast (SINGULAIR) 10 mg tablet; Take 1 tablet (10 mg total) by mouth every evening.  Dispense: 30 tablet; Refill: 0    Benign mole  -     Ambulatory referral/consult to Family Practice  -     Ambulatory referral/consult to Dermatology    Asthma, unspecified asthma severity, unspecified whether complicated, unspecified whether persistent      We had shared decision making for patient's treatment. We discussed side effects/alternatives/benefits/risk and patient would like to proceed with treatment plan. We also discussed other OTC treatment recommendations. Patient was counseled, explained with the test results meaning, expected course, and answered all of questions. Patient can take OTC Acetaminophen (Tylenol) and/or Ibuprofen (Motrin) as needed for pain relief and/or fever relief. Continue to drink plenty of fluids. Follow up with PCP in the next 1-2 weeks as needed.  Gave patient strict ER/urgent care precautions in case symptoms worsen or if any new concerns arise.

## 2024-06-03 NOTE — PATIENT INSTRUCTIONS
- Rest.    - Drink plenty of fluids.    - Acetaminophen (tylenol) or Ibuprofen (advil,motrin) as directed as needed for fever/pain. Avoid tylenol if you have a history of liver disease. Do not take ibuprofen if you have a history of GI bleeding, kidney disease, or if you take blood thinners.   - CALL [phone] TO SCHEDULE APPOINTMENT.  - Follow up with your PCP or specialty clinic as directed in the next 1-2 weeks if not improved or as needed.  You can call [phone] to schedule an appointment with the appropriate provider.    - Go to the ER or seek medical attention immediately if you develop new or worsening symptoms.     - You must understand that you have received an Urgent Care treatment only and that you may be released before all of your medical problems are known or treated.   - You, the patient, will arrange for follow up care as instructed.   - If your condition worsens or fails to improve we recommend that you receive another evaluation at the ER immediately or contact your PCP to discuss your concerns or return here.

## 2024-06-04 ENCOUNTER — TELEPHONE (OUTPATIENT)
Dept: PULMONOLOGY | Facility: CLINIC | Age: 29
End: 2024-06-04
Payer: COMMERCIAL

## 2024-06-04 DIAGNOSIS — J45.909 ASTHMA, UNSPECIFIED ASTHMA SEVERITY, UNSPECIFIED WHETHER COMPLICATED, UNSPECIFIED WHETHER PERSISTENT: Primary | ICD-10-CM

## 2024-06-06 ENCOUNTER — TELEPHONE (OUTPATIENT)
Dept: ADMINISTRATIVE | Facility: HOSPITAL | Age: 29
End: 2024-06-06
Payer: COMMERCIAL

## 2024-06-06 ENCOUNTER — OFFICE VISIT (OUTPATIENT)
Dept: DERMATOLOGY | Facility: CLINIC | Age: 29
End: 2024-06-06
Payer: COMMERCIAL

## 2024-06-06 DIAGNOSIS — D18.01 CHERRY ANGIOMA: ICD-10-CM

## 2024-06-06 DIAGNOSIS — Z12.83 SCREENING EXAM FOR SKIN CANCER: ICD-10-CM

## 2024-06-06 DIAGNOSIS — L70.0 ACNE VULGARIS: ICD-10-CM

## 2024-06-06 DIAGNOSIS — L64.9 ANDROGENETIC ALOPECIA: ICD-10-CM

## 2024-06-06 DIAGNOSIS — D22.9 MULTIPLE BENIGN NEVI: ICD-10-CM

## 2024-06-06 DIAGNOSIS — L81.4 LENTIGO: ICD-10-CM

## 2024-06-06 DIAGNOSIS — L21.9 SEBORRHEIC DERMATITIS: ICD-10-CM

## 2024-06-06 DIAGNOSIS — D48.5 NEOPLASM OF UNCERTAIN BEHAVIOR OF SKIN: Primary | ICD-10-CM

## 2024-06-06 PROCEDURE — 1159F MED LIST DOCD IN RCRD: CPT | Mod: CPTII,S$GLB,, | Performed by: STUDENT IN AN ORGANIZED HEALTH CARE EDUCATION/TRAINING PROGRAM

## 2024-06-06 PROCEDURE — 99204 OFFICE O/P NEW MOD 45 MIN: CPT | Mod: S$GLB,,, | Performed by: STUDENT IN AN ORGANIZED HEALTH CARE EDUCATION/TRAINING PROGRAM

## 2024-06-06 PROCEDURE — 1160F RVW MEDS BY RX/DR IN RCRD: CPT | Mod: CPTII,S$GLB,, | Performed by: STUDENT IN AN ORGANIZED HEALTH CARE EDUCATION/TRAINING PROGRAM

## 2024-06-06 PROCEDURE — 99999 PR PBB SHADOW E&M-EST. PATIENT-LVL IV: CPT | Mod: PBBFAC,,, | Performed by: STUDENT IN AN ORGANIZED HEALTH CARE EDUCATION/TRAINING PROGRAM

## 2024-06-06 RX ORDER — FLUOCINONIDE TOPICAL SOLUTION USP, 0.05% 0.5 MG/ML
SOLUTION TOPICAL DAILY
Qty: 60 ML | Refills: 3 | Status: SHIPPED | OUTPATIENT
Start: 2024-06-06

## 2024-06-06 RX ORDER — KETOCONAZOLE 20 MG/ML
SHAMPOO, SUSPENSION TOPICAL
Qty: 240 ML | Refills: 6 | Status: SHIPPED | OUTPATIENT
Start: 2024-06-07

## 2024-06-06 NOTE — PROGRESS NOTES
Subjective:      Patient ID:  Baron CALEB Shannon is a 29 y.o. male who presents for   Chief Complaint   Patient presents with    Skin Check     UBSE     Pt present for UBSE.    Patient with new are of concern:   Location: left ear  Duration: whole life  S/S; getting bigger  Previous treatments: none    Patient with new are of concern:   Location: chest  Duration: whole life  S/S: none  Previous treatments: moisturizer and cleanser        Review of Systems   Skin:  Positive for daily sunscreen use and activity-related sunscreen use. Negative for recent sunburn.   Hematologic/Lymphatic: Does not bruise/bleed easily.       Objective:   Physical Exam   Constitutional: He appears well-developed and well-nourished. No distress.   Neurological: He is alert and oriented to person, place, and time. He is not disoriented.   Psychiatric: He has a normal mood and affect.   Skin:   Areas Examined (abnormalities noted in diagram):   Scalp / Hair Palpated and Inspected  Head / Face Inspection Performed  Neck Inspection Performed  Chest / Axilla Inspection Performed  Abdomen Inspection Performed  Back Inspection Performed  RUE Inspected  LUE Inspection Performed                 Diagram Legend     Erythematous scaling macule/papule c/w actinic keratosis       Vascular papule c/w angioma      Pigmented verrucoid papule/plaque c/w seborrheic keratosis      Yellow umbilicated papule c/w sebaceous hyperplasia      Irregularly shaped tan macule c/w lentigo     1-2 mm smooth white papules consistent with Milia      Movable subcutaneous cyst with punctum c/w epidermal inclusion cyst      Subcutaneous movable cyst c/w pilar cyst      Firm pink to brown papule c/w dermatofibroma      Pedunculated fleshy papule(s) c/w skin tag(s)      Evenly pigmented macule c/w junctional nevus     Mildly variegated pigmented, slightly irregular-bordered macule c/w mildly atypical nevus      Flesh colored to evenly pigmented papule c/w intradermal nevus        Pink pearly papule/plaque c/w basal cell carcinoma      Erythematous hyperkeratotic cursted plaque c/w SCC      Surgical scar with no sign of skin cancer recurrence      Open and closed comedones      Inflammatory papules and pustules      Verrucoid papule consistent consistent with wart     Erythematous eczematous patches and plaques     Dystrophic onycholytic nail with subungual debris c/w onychomycosis     Umbilicated papule    Erythematous-base heme-crusted tan verrucoid plaque consistent with inflamed seborrheic keratosis     Erythematous Silvery Scaling Plaque c/w Psoriasis     See annotation            Assessment / Plan:        Neoplasm of uncertain behavior of skin--left ear helix  - slightly clinically atypical. Overall relatively low suspicion but given atypia recommended dermtech vs biopsy. Discussed high NPV of dermtech. He chose to proceed with dermtech    Site: Left ear helix  DermTech kit #: [id number]    Multiple benign nevi  Lentigo  Cherry angioma  Reassurance given to patient. No treatment is necessary.   Treatment of benign, asymptomatic lesions may be considered cosmetic.    Screening exam for skin cancer  Upper body skin examination performed today including at least 6 points as noted in physical examination. Suspicious lesions noted.    Recommend daily sun protection/avoidance and use of at least SPF 30, broad spectrum sunscreen (OTC drug).     Seborrheic dermatitis  -     ketoconazole (NIZORAL) 2 % shampoo; Apply topically 3 (three) times a week. Leave on for 5-10 minutes then wash off  Dispense: 240 mL; Refill: 6  -     fluocinonide (LIDEX) 0.05 % external solution; Apply topically once daily. Use to affected areas for up to 2 weeks then take a 1 week break or decrease to 3 times weekly. Do not apply to groin or face. Use for scalp  Dispense: 60 mL; Refill: 3    Androgenetic alopecia  - discussed chronicity of disease   - discussed treatment options including topical and systemic medications,  PRP, hair transplant   -  start compounded medication from [facility] twice daily--Minoxidil 7% and Finasteride. Discussed that pt needs to use medication daily for efficacy. May take 6-12 months to see an improvement. Wash hands after using     Acne vulgaris- back  10% BPO wash         Follow up if symptoms worsen or fail to improve.

## 2024-06-18 ENCOUNTER — TELEPHONE (OUTPATIENT)
Dept: DERMATOLOGY | Facility: CLINIC | Age: 29
End: 2024-06-18
Payer: COMMERCIAL

## 2024-06-18 NOTE — TELEPHONE ENCOUNTER
DermTech result as below:    Site: left ear helix  Date collected 6/6/24    Kit ID/Accn /[id number] / [id number]    Pigmented Lesion Assay (RNA Gene Expression): POSITIVE +    [id number]: NOT DETECTED  PRAME: DETECTED    I called the patient and informed him of the report. Next step is to have him f/u for biopsy. Will have my nurse schedule

## 2024-06-19 ENCOUNTER — OFFICE VISIT (OUTPATIENT)
Dept: DERMATOLOGY | Facility: CLINIC | Age: 29
End: 2024-06-19
Payer: COMMERCIAL

## 2024-06-19 DIAGNOSIS — D48.5 NEOPLASM OF UNCERTAIN BEHAVIOR OF SKIN: Primary | ICD-10-CM

## 2024-06-19 PROCEDURE — 99499 UNLISTED E&M SERVICE: CPT | Mod: S$GLB,,, | Performed by: STUDENT IN AN ORGANIZED HEALTH CARE EDUCATION/TRAINING PROGRAM

## 2024-06-19 PROCEDURE — 11102 TANGNTL BX SKIN SINGLE LES: CPT | Mod: S$GLB,,, | Performed by: STUDENT IN AN ORGANIZED HEALTH CARE EDUCATION/TRAINING PROGRAM

## 2024-06-19 PROCEDURE — 99999 PR PBB SHADOW E&M-EST. PATIENT-LVL II: CPT | Mod: PBBFAC,,, | Performed by: STUDENT IN AN ORGANIZED HEALTH CARE EDUCATION/TRAINING PROGRAM

## 2024-06-19 NOTE — PROGRESS NOTES
Subjective:      Patient ID:  Baron CALEB Shannon is a 29 y.o. male who presents for   Chief Complaint   Patient presents with    Mole     Left ear     Pt present for mole removal of left ear.     Mole      Seen 6/6/24 for skin check. Had slightly atypical pigmented lesion on left ear helix. DermTech done and result as below. Here today for biopsy    DermTech result as below:     Site: left ear helix  Date collected 6/6/24     Kit ID/Accn /[id number] / [id number]     Pigmented Lesion Assay (RNA Gene Expression): POSITIVE +     [id number]: NOT DETECTED  PRAME: DETECTED    Review of Systems   Skin:  Positive for daily sunscreen use and activity-related sunscreen use. Negative for recent sunburn.   Hematologic/Lymphatic: Does not bruise/bleed easily.       Objective:   Physical Exam   Skin:   Areas Examined (abnormalities noted in diagram):   Head / Face Inspection Performed            Diagram Legend     Erythematous scaling macule/papule c/w actinic keratosis       Vascular papule c/w angioma      Pigmented verrucoid papule/plaque c/w seborrheic keratosis      Yellow umbilicated papule c/w sebaceous hyperplasia      Irregularly shaped tan macule c/w lentigo     1-2 mm smooth white papules consistent with Milia      Movable subcutaneous cyst with punctum c/w epidermal inclusion cyst      Subcutaneous movable cyst c/w pilar cyst      Firm pink to brown papule c/w dermatofibroma      Pedunculated fleshy papule(s) c/w skin tag(s)      Evenly pigmented macule c/w junctional nevus     Mildly variegated pigmented, slightly irregular-bordered macule c/w mildly atypical nevus      Flesh colored to evenly pigmented papule c/w intradermal nevus       Pink pearly papule/plaque c/w basal cell carcinoma      Erythematous hyperkeratotic cursted plaque c/w SCC      Surgical scar with no sign of skin cancer recurrence      Open and closed comedones      Inflammatory papules and pustules      Verrucoid papule consistent consistent with wart      Erythematous eczematous patches and plaques     Dystrophic onycholytic nail with subungual debris c/w onychomycosis     Umbilicated papule    Erythematous-base heme-crusted tan verrucoid plaque consistent with inflamed seborrheic keratosis     Erythematous Silvery Scaling Plaque c/w Psoriasis     See annotation            Assessment / Plan:      Pathology Orders:       Normal Orders This Visit    Specimen to Pathology, Dermatology     Questions:    Procedure Type: Dermatology and skin neoplasms    Number of Specimens: 1    ------------------------: -------------------------    Spec 1 Procedure: Biopsy    Spec 1 Clinical Impression: dysplastic nevus. R/o melanoma    Spec 1 Source: left ear helix    Release to patient: Immediate    Release to patient:           Neoplasm of uncertain behavior of skin  -     Specimen to Pathology, Dermatology    Shave biopsy procedure note:  Shave biopsy performed after verbal consent including risk of infection, scar, recurrence, need for additional treatment of site. Area prepped with alcohol, anesthetized with approximately 1.0cc of 1% lidocaine with epinephrine. Lesional tissue shaved with razor blade. Hemostasis achieved with application of aluminum chloride followed by hyfrecation. No complications. Dressing applied. Wound care explained.         Follow up if symptoms worsen or fail to improve.

## 2024-11-05 ENCOUNTER — OFFICE VISIT (OUTPATIENT)
Dept: URGENT CARE | Facility: CLINIC | Age: 29
End: 2024-11-05
Payer: COMMERCIAL

## 2024-11-05 VITALS
DIASTOLIC BLOOD PRESSURE: 82 MMHG | WEIGHT: 210 LBS | SYSTOLIC BLOOD PRESSURE: 121 MMHG | OXYGEN SATURATION: 96 % | RESPIRATION RATE: 20 BRPM | BODY MASS INDEX: 28.44 KG/M2 | HEART RATE: 61 BPM | HEIGHT: 72 IN | TEMPERATURE: 98 F

## 2024-11-05 DIAGNOSIS — J45.901 MILD ASTHMA WITH ACUTE EXACERBATION, UNSPECIFIED WHETHER PERSISTENT: ICD-10-CM

## 2024-11-05 DIAGNOSIS — J30.2 SEASONAL ALLERGIC RHINITIS, UNSPECIFIED TRIGGER: Primary | ICD-10-CM

## 2024-11-05 DIAGNOSIS — J34.9 SINUS PROBLEM: ICD-10-CM

## 2024-11-05 LAB
CTP QC/QA: YES
SARS-COV-2 AG RESP QL IA.RAPID: NEGATIVE

## 2024-11-05 PROCEDURE — 87811 SARS-COV-2 COVID19 W/OPTIC: CPT | Mod: QW,S$GLB,,

## 2024-11-05 PROCEDURE — 99214 OFFICE O/P EST MOD 30 MIN: CPT | Mod: S$GLB,,,

## 2024-11-05 RX ORDER — PREDNISONE 20 MG/1
40 TABLET ORAL DAILY
Qty: 8 TABLET | Refills: 0 | Status: SHIPPED | OUTPATIENT
Start: 2024-11-05 | End: 2024-11-09

## 2024-11-05 RX ORDER — ALBUTEROL SULFATE 0.83 MG/ML
2.5 SOLUTION RESPIRATORY (INHALATION) EVERY 6 HOURS PRN
Qty: 3 ML | Refills: 10 | Status: SHIPPED | OUTPATIENT
Start: 2024-11-05 | End: 2025-11-05

## 2024-11-05 RX ORDER — ALBUTEROL SULFATE 90 UG/1
2 INHALANT RESPIRATORY (INHALATION) EVERY 6 HOURS PRN
Qty: 18 G | Refills: 5 | Status: SHIPPED | OUTPATIENT
Start: 2024-11-05 | End: 2025-11-05

## 2024-11-05 NOTE — PROGRESS NOTES
Subjective:      Patient ID: Baron CALEB Shannon is a 29 y.o. male.    Vitals:  height is 6' (1.829 m) and weight is 95.3 kg (210 lb). His oral temperature is 97.8 °F (36.6 °C). His blood pressure is 121/82 and his pulse is 61. His respiration is 20 and oxygen saturation is 96%.     Chief Complaint: Sinus Problem    Pt present with symptoms of- (Asthma Flare ) Sore Throat, Runny Nose, Nasal Congestion and Cough X 4 days and requesting a refill on all his Asthma meds.     Provider note    28 yo M c/o congestion, cough, runny nose x 4 days. This has caused his asthma to flare up and he is experiencing wheezing, chest tightness. He uses a daily inhaler and albuterol prn. States he has had to use albuterol 2-3x a day as well as nebulizer treatments at home, last one this am prior to arrival. His last flare was earlier this year. Denies sob and wheezing in clinic today.     Sinus Problem  This is a new problem. The current episode started in the past 7 days. The problem has been gradually worsening since onset. His pain is at a severity of 0/10. He is experiencing no pain. Associated symptoms include congestion, coughing and a sore throat. Pertinent negatives include no chills, diaphoresis, ear pain, headaches, shortness of breath, sinus pressure or sneezing. Treatments tried: OTC- Cough Meds, Cough Drops and Cough med. The treatment provided no relief.       Constitution: Negative for chills, sweating, fatigue and fever.   HENT:  Positive for congestion and sore throat. Negative for ear pain and sinus pressure.    Respiratory:  Positive for chest tightness, cough, wheezing and asthma. Negative for sputum production and shortness of breath.    Allergic/Immunologic: Positive for asthma. Negative for sneezing.   Neurological:  Negative for headaches.      Objective:     Physical Exam   Constitutional: He is oriented to person, place, and time.   HENT:   Head: Normocephalic and atraumatic.   Ears:   Right Ear: Tympanic  membrane, external ear and ear canal normal.   Left Ear: Tympanic membrane, external ear and ear canal normal.   Nose: Congestion present. No rhinorrhea.   Mouth/Throat: No oropharyngeal exudate or posterior oropharyngeal erythema.   Eyes: Conjunctivae are normal.   Cardiovascular: Normal rate, regular rhythm and normal heart sounds.   Pulmonary/Chest: Effort normal and breath sounds normal. No respiratory distress. He has no wheezes.   Abdominal: Normal appearance.   Musculoskeletal: Normal range of motion.         General: Normal range of motion.   Neurological: He is alert and oriented to person, place, and time.   Skin: Skin is warm and dry.     Results for orders placed or performed in visit on 11/05/24   SARS Coronavirus 2 Antigen, POCT Manual Read    Collection Time: 11/05/24  9:02 AM   Result Value Ref Range    SARS Coronavirus 2 Antigen Negative Negative     Acceptable Yes          Assessment:     1. Seasonal allergic rhinitis, unspecified trigger    2. Sinus problem    3. Mild asthma with acute exacerbation, unspecified whether persistent        Plan:       Seasonal allergic rhinitis, unspecified trigger    Sinus problem  -     SARS Coronavirus 2 Antigen, POCT Manual Read    Mild asthma with acute exacerbation, unspecified whether persistent  -     albuterol (VENTOLIN HFA) 90 mcg/actuation inhaler; Inhale 2 puffs into the lungs every 6 (six) hours as needed for Wheezing. Rescue  Dispense: 18 g; Refill: 5  -     albuterol (PROVENTIL) 2.5 mg /3 mL (0.083 %) nebulizer solution; Take 3 mLs (2.5 mg total) by nebulization every 6 (six) hours as needed for Wheezing. Rescue  Dispense: 3 mL; Refill: 10  -     predniSONE (DELTASONE) 20 MG tablet; Take 2 tablets (40 mg total) by mouth once daily. for 4 days  Dispense: 8 tablet; Refill: 0  -     albuterol (PROAIR HFA) 90 mcg/actuation inhaler; Inhale 2 puffs into the lungs every 6 (six) hours as needed for Wheezing. Rescue  Dispense: 18 g; Refill:  5        We appreciate you trusting us with your medical care. We hope you feel better soon. We will be happy to take care of you for all of your future medical needs.  You must understand that you've received an Urgent Care treatment only and that you may be released before all your medical problems are known or treated. You, the patient, will arrange for follow up care as instructed.  Follow up with your PCP or specialty clinic as directed in the next 1-2 weeks if not improved or as needed.  You can call [phone] to schedule an appointment with the appropriate provider.  Another option is to follow up with [facility] ([email or web address]) virtually for quick simple medical advice.  If your condition worsens we recommend that you receive another evaluation at the emergency room immediately or contact your primary medical clinics after hours call service to discuss your concerns.  Please return here or go to the Emergency Department for any concerns or worsening of condition.                         Acitretin Counseling:  I discussed with the patient the risks of acitretin including but not limited to hair loss, dry lips/skin/eyes, liver damage, hyperlipidemia, depression/suicidal ideation, photosensitivity.  Serious rare side effects can include but are not limited to pancreatitis, pseudotumor cerebri, bony changes, clot formation/stroke/heart attack.  Patient understands that alcohol is contraindicated since it can result in liver toxicity and significantly prolong the elimination of the drug by many years.

## 2024-11-11 ENCOUNTER — OFFICE VISIT (OUTPATIENT)
Dept: URGENT CARE | Facility: CLINIC | Age: 29
End: 2024-11-11
Payer: COMMERCIAL

## 2024-11-11 VITALS
BODY MASS INDEX: 28.44 KG/M2 | OXYGEN SATURATION: 97 % | SYSTOLIC BLOOD PRESSURE: 134 MMHG | HEIGHT: 72 IN | WEIGHT: 210 LBS | RESPIRATION RATE: 17 BRPM | HEART RATE: 79 BPM | DIASTOLIC BLOOD PRESSURE: 88 MMHG | TEMPERATURE: 98 F

## 2024-11-11 DIAGNOSIS — J02.9 SORE THROAT: ICD-10-CM

## 2024-11-11 DIAGNOSIS — J02.9 PHARYNGITIS, UNSPECIFIED ETIOLOGY: Primary | ICD-10-CM

## 2024-11-11 LAB
CTP QC/QA: YES
MOLECULAR STREP A: NEGATIVE

## 2024-11-11 PROCEDURE — 99213 OFFICE O/P EST LOW 20 MIN: CPT | Mod: S$GLB,,,

## 2024-11-11 PROCEDURE — 87651 STREP A DNA AMP PROBE: CPT | Mod: QW,S$GLB,,

## 2024-11-11 RX ORDER — AMOXICILLIN 500 MG/1
500 TABLET, FILM COATED ORAL EVERY 12 HOURS
Qty: 20 TABLET | Refills: 0 | Status: SHIPPED | OUTPATIENT
Start: 2024-11-11 | End: 2024-11-21

## 2024-11-11 NOTE — LETTER
November 11, 2024      [facility] [address]  [place] [address]  Phone: [phone]  Fax: [phone]       Patient: Baron Millie Shannon   YOB: 1995  Date of Visit: 11/11/2024    To Whom It May Concern:    Lavonne Shannon  was at [facility] on 11/11/2024. The patient may return to work/school on November 12th, 2024 or sooner with no restrictions. If you have any questions or concerns, or if I can be of further assistance, please do not hesitate to contact me.    Sincerely,          Jefferson Luna PA-C

## 2024-11-11 NOTE — PATIENT INSTRUCTIONS
Sore Throat: Amoxicillin twice daily for 10 days  Fever/Pain: Alternate Tylenol and Ibuprofen as needed every 4-6 hours  Monitor for chest pain, shortness of breath, worsening of symptoms, or fever unresponsive to medication.   Please drink plenty of fluids.  Please get plenty of rest.  Please return here or go to the Emergency Department for any concerns or worsening of condition.  If you were prescribed antibiotics, please take them to completion.  If you were given wait & see antibiotics, please wait 5-7 days before taking them, and only take them if your symptoms have worsened or not improved.  If you do begin taking the antibiotics, please take them to completion.  If you were prescribed a narcotic medication, do not drive or operate heavy equipment or machinery while taking these medications.  If you were given a steroid shot in the clinic and have also been given a prescription for a steroid such as Prednisone or a Medrol Dose Pack, please begin taking them tomorrow.  If you do not have Hypertension or any history of palpitations, it is ok to take over the counter Sudafed or Mucinex D or Allegra-D or Claritin-D or Zyrtec-D.  If you do take one of the above, it is ok to combine that with plain over the counter Mucinex or Allegra or Claritin or Zyrtec.  If for example you are taking Zyrtec -D, you can combine that with Mucinex, but not Mucinex-D.  If you are taking Mucinex-D, you can combine that with plain Allegra or Claritin or Zyrtec.   If you do have Hypertension or palpitations, it is safe to take Coricidin HBP for relief of sinus symptoms.  If not allergic, please take over the counter Tylenol (Acetaminophen) and/or Motrin (Ibuprofen) as directed for control of pain and/or fever.  Please follow up with your primary care doctor or specialist as needed.    If you  smoke, please stop smoking.

## 2024-11-11 NOTE — PROGRESS NOTES
Subjective:      Patient ID: Baron CALEB Shannon is a 29 y.o. male.    Vitals:  height is 6' (1.829 m) and weight is 95.3 kg (210 lb). His oral temperature is 98.4 °F (36.9 °C). His blood pressure is 134/88 and his pulse is 79. His respiration is 17 and oxygen saturation is 97%.     Chief Complaint: Hoarse    Pt is a 28 yo male presenting with loss of voice, sore throat, hoarseness. Onset of symptoms was 5 days ago. Pt was seen in  6 days ago, but symptoms are worse. Denies CP, SOB, fever, chills, ABD pain, N/V/D. Pt reports using OTC Ibuprofen and Advil. Pt was exposed to strep.      Sore Throat   This is a new problem. The current episode started in the past 7 days. The problem has been unchanged. There has been no fever. The pain is at a severity of 5/10. The pain is moderate. Associated symptoms include a hoarse voice, swollen glands and trouble swallowing. Pertinent negatives include no abdominal pain, congestion, coughing, diarrhea, ear pain, plugged ear sensation, neck pain, shortness of breath or vomiting.     Constitution: Negative for activity change, appetite change, chills, fatigue and fever.   HENT:  Positive for sore throat and trouble swallowing. Negative for ear pain, congestion, postnasal drip, sinus pain and sinus pressure.    Neck: Negative for neck pain and neck swelling.   Cardiovascular:  Negative for chest pain and sob on exertion.   Eyes:  Negative for eye trauma, eye discharge and eye redness.   Respiratory:  Negative for cough, shortness of breath and wheezing.    Gastrointestinal:  Negative for abdominal pain, nausea, vomiting, constipation and diarrhea.   Genitourinary:  Negative for dysuria, frequency, urgency and urine decreased.   Musculoskeletal:  Negative for pain, joint pain, joint swelling, abnormal ROM of joint and muscle ache.   Skin:  Negative for color change, rash, laceration and erythema.   Neurological:  Negative for dizziness, light-headedness, altered mental status and  numbness.   Psychiatric/Behavioral:  Negative for altered mental status and confusion.       Objective:     Physical Exam   Constitutional: He is oriented to person, place, and time. He appears well-developed. He is cooperative.  Non-toxic appearance. He does not appear ill. No distress.      Comments:Pt sitting erect on examination table. No acute respiratory distress, no use of accessory muscles, no notice of nasal flaring.        HENT:   Head: Normocephalic and atraumatic.   Ears:   Right Ear: Hearing and external ear normal.   Left Ear: Hearing and external ear normal.   Nose: Nose normal. No mucosal edema, rhinorrhea or nasal deformity. No epistaxis. Right sinus exhibits no maxillary sinus tenderness and no frontal sinus tenderness. Left sinus exhibits no maxillary sinus tenderness and no frontal sinus tenderness.   Mouth/Throat: Uvula is midline and mucous membranes are normal. No trismus in the jaw. Normal dentition. No uvula swelling. Posterior oropharyngeal erythema present. No oropharyngeal exudate or posterior oropharyngeal edema. Tonsillar exudate.   Eyes: Conjunctivae and lids are normal. No scleral icterus.   Neck: Trachea normal and phonation normal. Neck supple. No edema present. No erythema present. No neck rigidity present.   Cardiovascular: Normal rate, regular rhythm, normal heart sounds and normal pulses.   Pulmonary/Chest: Effort normal and breath sounds normal. No accessory muscle usage. No apnea, no tachypnea and no bradypnea. No respiratory distress. He has no decreased breath sounds. He has no rhonchi.   Lungs clear to auscultation B/L           Comments: Lungs clear to auscultation B/L      Abdominal: Normal appearance.   Musculoskeletal: Normal range of motion.         General: No deformity. Normal range of motion.   Neurological: He is alert and oriented to person, place, and time. He exhibits normal muscle tone. Coordination normal.   Skin: Skin is warm, dry, intact, not diaphoretic and  not pale. No erythema   Psychiatric: His speech is normal and behavior is normal. Judgment and thought content normal.   Nursing note and vitals reviewed.    Results for orders placed or performed in visit on 11/11/24   POCT Strep A, Molecular    Collection Time: 11/11/24 12:25 PM   Result Value Ref Range    Molecular Strep A, POC Negative Negative     Acceptable Yes        Assessment:     1. Pharyngitis, unspecified etiology    2. Sore throat        Plan:   I have reviewed the patient chart and pertinent past imaging/labs.  Note and labs from 11/5 reviewed.     Pharyngitis, unspecified etiology  -     amoxicillin (AMOXIL) 500 MG Tab; Take 1 tablet (500 mg total) by mouth every 12 (twelve) hours. for 10 days  Dispense: 20 tablet; Refill: 0    Sore throat  -     POCT Strep A, Molecular

## (undated) DEVICE — SPONGE NEURO 1/2 X 2

## (undated) DEVICE — TRACKER PATIENT NON INVASIVE

## (undated) DEVICE — NDL HYPO REG 25G X 1 1/2

## (undated) DEVICE — BLADE TRICUT

## (undated) DEVICE — TRACKER ENT INSTRUMENT

## (undated) DEVICE — GAUZE SPONGE 4'X4 12 PLY

## (undated) DEVICE — SEE MEDLINE ITEM 156955

## (undated) DEVICE — SPONGE DERMACEA 4X4IN 12PLY

## (undated) DEVICE — KIT ANTIFOG

## (undated) DEVICE — PACKING NASAL POSTERIOR W/DRAW

## (undated) DEVICE — SUT PLN GUT 4-0 SC-1SC-1 1

## (undated) DEVICE — SUT 4-0 CHROMIC GUT / P-3

## (undated) DEVICE — SEE MEDLINE ITEM 146313

## (undated) DEVICE — COVER OVERHEAD SURG LT BLUE

## (undated) DEVICE — SYR 10CC LUER LOCK

## (undated) DEVICE — SOL NACL 0.9% INJ 500ML BG

## (undated) DEVICE — SUT 2/0 30IN SILK BLK BRAI

## (undated) DEVICE — KIT SINUS ENDOSCOPY PACKNG

## (undated) DEVICE — SEE MEDLINE ITEM 157117

## (undated) DEVICE — CONTAINER MULTIPURPOSE/SPECIME

## (undated) DEVICE — POSITIONER HEAD DONUT 9IN FOAM

## (undated) DEVICE — SEE MEDLINE ITEM 152622

## (undated) DEVICE — CANISTER SUCTION 3000CC

## (undated) DEVICE — CLOSURE SKIN STERI STRIP 1/2X4

## (undated) DEVICE — SEE MEDLINE ITEM 146372

## (undated) DEVICE — SEE L#120831

## (undated) DEVICE — SEE MEDLINE ITEM 157194

## (undated) DEVICE — PACK HEAD & NECK

## (undated) DEVICE — DRESSING NASAL INJECT MEROGEL

## (undated) DEVICE — GLOVE BIOGEL ULTRATOUCH 6.5

## (undated) DEVICE — POSITIONER HEEL FOAM CONVOLTD

## (undated) DEVICE — COLLECTOR SPECIMAN ARTHRO

## (undated) DEVICE — ADHESIVE MASTISOL VIAL 48/BX

## (undated) DEVICE — PACKING NASAL L 4CM X 4CM ST

## (undated) DEVICE — ELECTRODE REM PLYHSV RETURN 9

## (undated) DEVICE — SEE MEDLINE ITEM 154981

## (undated) DEVICE — SEE MEDLINE ITEM 157116

## (undated) DEVICE — BLADE INFERIOR TURBINATE 2MM